# Patient Record
Sex: FEMALE | Race: WHITE | NOT HISPANIC OR LATINO
[De-identification: names, ages, dates, MRNs, and addresses within clinical notes are randomized per-mention and may not be internally consistent; named-entity substitution may affect disease eponyms.]

---

## 2017-01-26 ENCOUNTER — MEDICATION RENEWAL (OUTPATIENT)
Age: 77
End: 2017-01-26

## 2017-01-28 ENCOUNTER — RX RENEWAL (OUTPATIENT)
Age: 77
End: 2017-01-28

## 2017-03-07 ENCOUNTER — APPOINTMENT (OUTPATIENT)
Dept: PULMONOLOGY | Facility: CLINIC | Age: 77
End: 2017-03-07

## 2017-03-07 VITALS
HEIGHT: 66 IN | WEIGHT: 150 LBS | DIASTOLIC BLOOD PRESSURE: 75 MMHG | BODY MASS INDEX: 24.11 KG/M2 | HEART RATE: 107 BPM | OXYGEN SATURATION: 100 % | SYSTOLIC BLOOD PRESSURE: 120 MMHG | RESPIRATION RATE: 17 BRPM

## 2017-03-07 DIAGNOSIS — E66.9 OBESITY, UNSPECIFIED: ICD-10-CM

## 2017-03-07 DIAGNOSIS — R06.83 SNORING: ICD-10-CM

## 2017-03-07 RX ORDER — AMOXICILLIN 500 MG/1
500 CAPSULE ORAL
Qty: 21 | Refills: 0 | Status: DISCONTINUED | COMMUNITY
Start: 2016-09-19 | End: 2017-03-07

## 2017-03-07 RX ORDER — METHYLPREDNISOLONE 4 MG/1
4 TABLET ORAL
Qty: 21 | Refills: 0 | Status: DISCONTINUED | COMMUNITY
Start: 2017-01-29 | End: 2017-03-07

## 2017-03-07 RX ORDER — BETAMETHASONE DIPROPIONATE 0.5 MG/G
0.05 CREAM, AUGMENTED TOPICAL
Qty: 50 | Refills: 0 | Status: ACTIVE | COMMUNITY
Start: 2017-02-23

## 2017-03-07 RX ORDER — CEFADROXIL 500 MG/1
500 CAPSULE ORAL
Qty: 21 | Refills: 0 | Status: DISCONTINUED | COMMUNITY
Start: 2016-12-20 | End: 2017-03-07

## 2017-03-07 RX ORDER — BUTALBITAL, ACETAMINOPHEN AND CAFFEINE 325; 50; 40 MG/1; MG/1; MG/1
50-325-40 TABLET ORAL
Qty: 120 | Refills: 0 | Status: ACTIVE | COMMUNITY
Start: 2016-10-25

## 2017-03-27 ENCOUNTER — RX RENEWAL (OUTPATIENT)
Age: 77
End: 2017-03-27

## 2017-04-24 ENCOUNTER — RX RENEWAL (OUTPATIENT)
Age: 77
End: 2017-04-24

## 2017-05-17 ENCOUNTER — RX RENEWAL (OUTPATIENT)
Age: 77
End: 2017-05-17

## 2017-05-23 ENCOUNTER — RX RENEWAL (OUTPATIENT)
Age: 77
End: 2017-05-23

## 2017-06-08 ENCOUNTER — APPOINTMENT (OUTPATIENT)
Dept: PULMONOLOGY | Facility: CLINIC | Age: 77
End: 2017-06-08

## 2017-06-09 ENCOUNTER — APPOINTMENT (OUTPATIENT)
Dept: INTERNAL MEDICINE | Facility: CLINIC | Age: 77
End: 2017-06-09

## 2017-06-10 LAB
ALBUMIN SERPL ELPH-MCNC: 4.2 G/DL
ALP BLD-CCNC: 127 U/L
ALT SERPL-CCNC: 22 U/L
ANION GAP SERPL CALC-SCNC: 16 MMOL/L
AST SERPL-CCNC: 28 U/L
BILIRUB SERPL-MCNC: 0.2 MG/DL
BUN SERPL-MCNC: 32 MG/DL
CALCIUM SERPL-MCNC: 9.4 MG/DL
CHLORIDE SERPL-SCNC: 99 MMOL/L
CHOLEST SERPL-MCNC: 197 MG/DL
CHOLEST/HDLC SERPL: 2.1 RATIO
CO2 SERPL-SCNC: 25 MMOL/L
CREAT SERPL-MCNC: 0.93 MG/DL
GLUCOSE SERPL-MCNC: 89 MG/DL
HDLC SERPL-MCNC: 92 MG/DL
LDLC SERPL CALC-MCNC: 93 MG/DL
POTASSIUM SERPL-SCNC: 4.5 MMOL/L
PROT SERPL-MCNC: 7.6 G/DL
SODIUM SERPL-SCNC: 140 MMOL/L
TRIGL SERPL-MCNC: 61 MG/DL

## 2017-06-13 ENCOUNTER — APPOINTMENT (OUTPATIENT)
Dept: INTERNAL MEDICINE | Facility: CLINIC | Age: 77
End: 2017-06-13

## 2017-06-13 VITALS
HEART RATE: 70 BPM | DIASTOLIC BLOOD PRESSURE: 80 MMHG | SYSTOLIC BLOOD PRESSURE: 140 MMHG | WEIGHT: 173 LBS | BODY MASS INDEX: 27.92 KG/M2 | RESPIRATION RATE: 16 BRPM

## 2017-06-13 RX ORDER — RANITIDINE 300 MG/1
300 TABLET ORAL
Qty: 1 | Refills: 1 | Status: DISCONTINUED | COMMUNITY
Start: 2017-03-07 | End: 2017-06-13

## 2017-06-13 RX ORDER — MELOXICAM 7.5 MG/1
7.5 TABLET ORAL
Qty: 30 | Refills: 0 | Status: DISCONTINUED | COMMUNITY
Start: 2016-12-13 | End: 2017-06-13

## 2017-06-13 RX ORDER — IBUPROFEN 800 MG/1
800 TABLET, FILM COATED ORAL
Qty: 21 | Refills: 0 | Status: DISCONTINUED | COMMUNITY
Start: 2016-09-19 | End: 2017-06-13

## 2017-06-13 RX ORDER — AZELASTINE HYDROCHLORIDE 205.5 UG/1
0.15 SPRAY, METERED NASAL
Qty: 30 | Refills: 0 | Status: DISCONTINUED | COMMUNITY
Start: 2017-02-28 | End: 2017-06-13

## 2017-06-13 RX ORDER — IPRATROPIUM BROMIDE 42 UG/1
0.06 SPRAY NASAL
Qty: 3 | Refills: 1 | Status: DISCONTINUED | COMMUNITY
Start: 2017-03-07 | End: 2017-06-13

## 2017-06-13 RX ORDER — MONTELUKAST SODIUM 10 MG/1
10 TABLET, FILM COATED ORAL
Qty: 1 | Refills: 1 | Status: DISCONTINUED | COMMUNITY
Start: 2017-03-07 | End: 2017-06-13

## 2017-06-13 RX ORDER — FLUTICASONE PROPIONATE AND SALMETEROL 50; 250 UG/1; UG/1
250-50 POWDER RESPIRATORY (INHALATION)
Qty: 1 | Refills: 5 | Status: DISCONTINUED | COMMUNITY
Start: 2017-03-07 | End: 2017-06-13

## 2017-07-07 ENCOUNTER — RX RENEWAL (OUTPATIENT)
Age: 77
End: 2017-07-07

## 2017-07-18 ENCOUNTER — RX RENEWAL (OUTPATIENT)
Age: 77
End: 2017-07-18

## 2017-08-09 ENCOUNTER — RX RENEWAL (OUTPATIENT)
Age: 77
End: 2017-08-09

## 2017-09-14 ENCOUNTER — RX RENEWAL (OUTPATIENT)
Age: 77
End: 2017-09-14

## 2017-09-27 ENCOUNTER — RX RENEWAL (OUTPATIENT)
Age: 77
End: 2017-09-27

## 2017-10-02 ENCOUNTER — RX RENEWAL (OUTPATIENT)
Age: 77
End: 2017-10-02

## 2017-10-03 ENCOUNTER — MEDICATION RENEWAL (OUTPATIENT)
Age: 77
End: 2017-10-03

## 2017-11-28 ENCOUNTER — RX RENEWAL (OUTPATIENT)
Age: 77
End: 2017-11-28

## 2017-11-28 ENCOUNTER — APPOINTMENT (OUTPATIENT)
Dept: INTERNAL MEDICINE | Facility: CLINIC | Age: 77
End: 2017-11-28
Payer: MEDICARE

## 2017-11-28 PROCEDURE — 36415 COLL VENOUS BLD VENIPUNCTURE: CPT

## 2017-11-28 RX ORDER — ALPRAZOLAM 0.25 MG/1
0.25 TABLET ORAL
Qty: 60 | Refills: 0 | Status: DISCONTINUED | COMMUNITY
Start: 2017-01-26 | End: 2017-11-28

## 2017-11-29 LAB
25(OH)D3 SERPL-MCNC: 32.8 NG/ML
ALBUMIN SERPL ELPH-MCNC: 4 G/DL
ALP BLD-CCNC: 154 U/L
ALT SERPL-CCNC: 23 U/L
ANION GAP SERPL CALC-SCNC: 14 MMOL/L
APPEARANCE: CLEAR
AST SERPL-CCNC: 24 U/L
BASOPHILS # BLD AUTO: 0.04 K/UL
BASOPHILS NFR BLD AUTO: 0.9 %
BILIRUB SERPL-MCNC: 0.2 MG/DL
BILIRUBIN URINE: NEGATIVE
BLOOD URINE: NEGATIVE
BUN SERPL-MCNC: 28 MG/DL
CALCIUM SERPL-MCNC: 10 MG/DL
CHLORIDE SERPL-SCNC: 100 MMOL/L
CHOLEST SERPL-MCNC: 205 MG/DL
CHOLEST/HDLC SERPL: 2.3 RATIO
CO2 SERPL-SCNC: 27 MMOL/L
COLOR: YELLOW
CREAT SERPL-MCNC: 0.79 MG/DL
EOSINOPHIL # BLD AUTO: 0.4 K/UL
EOSINOPHIL NFR BLD AUTO: 8.5 %
GLUCOSE QUALITATIVE U: NEGATIVE MG/DL
GLUCOSE SERPL-MCNC: 88 MG/DL
HCT VFR BLD CALC: 41.8 %
HDLC SERPL-MCNC: 91 MG/DL
HGB BLD-MCNC: 13.3 G/DL
IMM GRANULOCYTES NFR BLD AUTO: 0.2 %
KETONES URINE: NEGATIVE
LDLC SERPL CALC-MCNC: 101 MG/DL
LEUKOCYTE ESTERASE URINE: NEGATIVE
LYMPHOCYTES # BLD AUTO: 1.19 K/UL
LYMPHOCYTES NFR BLD AUTO: 25.4 %
MAN DIFF?: NORMAL
MCHC RBC-ENTMCNC: 31.4 PG
MCHC RBC-ENTMCNC: 31.8 GM/DL
MCV RBC AUTO: 98.6 FL
MONOCYTES # BLD AUTO: 0.42 K/UL
MONOCYTES NFR BLD AUTO: 9 %
NEUTROPHILS # BLD AUTO: 2.62 K/UL
NEUTROPHILS NFR BLD AUTO: 56 %
NITRITE URINE: NEGATIVE
PH URINE: 6.5
PLATELET # BLD AUTO: 232 K/UL
POTASSIUM SERPL-SCNC: 5 MMOL/L
PROT SERPL-MCNC: 7.1 G/DL
PROTEIN URINE: NEGATIVE MG/DL
RBC # BLD: 4.24 M/UL
RBC # FLD: 13.3 %
SODIUM SERPL-SCNC: 141 MMOL/L
SPECIFIC GRAVITY URINE: 1.03
T4 SERPL-MCNC: 5.7 UG/DL
TRIGL SERPL-MCNC: 66 MG/DL
TSH SERPL-ACNC: 1.45 UIU/ML
UROBILINOGEN URINE: NEGATIVE MG/DL
WBC # FLD AUTO: 4.68 K/UL

## 2017-12-05 ENCOUNTER — APPOINTMENT (OUTPATIENT)
Dept: INTERNAL MEDICINE | Facility: CLINIC | Age: 77
End: 2017-12-05
Payer: MEDICARE

## 2017-12-05 ENCOUNTER — NON-APPOINTMENT (OUTPATIENT)
Age: 77
End: 2017-12-05

## 2017-12-05 VITALS
SYSTOLIC BLOOD PRESSURE: 130 MMHG | RESPIRATION RATE: 16 BRPM | BODY MASS INDEX: 26.36 KG/M2 | HEART RATE: 66 BPM | HEIGHT: 66 IN | WEIGHT: 164 LBS | DIASTOLIC BLOOD PRESSURE: 70 MMHG

## 2017-12-05 DIAGNOSIS — Z23 ENCOUNTER FOR IMMUNIZATION: ICD-10-CM

## 2017-12-05 DIAGNOSIS — R74.8 ABNORMAL LEVELS OF OTHER SERUM ENZYMES: ICD-10-CM

## 2017-12-05 DIAGNOSIS — I45.10 UNSPECIFIED RIGHT BUNDLE-BRANCH BLOCK: ICD-10-CM

## 2017-12-05 PROCEDURE — 99213 OFFICE O/P EST LOW 20 MIN: CPT | Mod: 25

## 2017-12-05 PROCEDURE — G0439: CPT

## 2017-12-05 PROCEDURE — G0009: CPT

## 2017-12-05 PROCEDURE — 93000 ELECTROCARDIOGRAM COMPLETE: CPT

## 2017-12-05 PROCEDURE — 90732 PPSV23 VACC 2 YRS+ SUBQ/IM: CPT

## 2017-12-05 PROCEDURE — 99497 ADVNCD CARE PLAN 30 MIN: CPT | Mod: 33

## 2017-12-05 RX ORDER — HYDROXYCHLOROQUINE SULFATE 200 MG/1
200 TABLET ORAL TWICE DAILY
Refills: 0 | Status: DISCONTINUED | COMMUNITY
Start: 2017-06-13 | End: 2017-12-05

## 2017-12-13 ENCOUNTER — RX RENEWAL (OUTPATIENT)
Age: 77
End: 2017-12-13

## 2018-01-22 ENCOUNTER — RX RENEWAL (OUTPATIENT)
Age: 78
End: 2018-01-22

## 2018-03-16 ENCOUNTER — RX RENEWAL (OUTPATIENT)
Age: 78
End: 2018-03-16

## 2018-04-12 ENCOUNTER — RX RENEWAL (OUTPATIENT)
Age: 78
End: 2018-04-12

## 2018-04-24 ENCOUNTER — RX RENEWAL (OUTPATIENT)
Age: 78
End: 2018-04-24

## 2018-05-10 ENCOUNTER — MEDICATION RENEWAL (OUTPATIENT)
Age: 78
End: 2018-05-10

## 2018-06-05 ENCOUNTER — APPOINTMENT (OUTPATIENT)
Dept: INTERNAL MEDICINE | Facility: CLINIC | Age: 78
End: 2018-06-05
Payer: MEDICARE

## 2018-06-05 VITALS
SYSTOLIC BLOOD PRESSURE: 130 MMHG | DIASTOLIC BLOOD PRESSURE: 80 MMHG | RESPIRATION RATE: 16 BRPM | BODY MASS INDEX: 27.92 KG/M2 | WEIGHT: 173 LBS | HEART RATE: 78 BPM

## 2018-06-05 DIAGNOSIS — J30.9 ALLERGIC RHINITIS, UNSPECIFIED: ICD-10-CM

## 2018-06-05 PROCEDURE — 36415 COLL VENOUS BLD VENIPUNCTURE: CPT

## 2018-06-05 PROCEDURE — 99214 OFFICE O/P EST MOD 30 MIN: CPT | Mod: 25

## 2018-06-05 RX ORDER — FAMCICLOVIR 500 MG/1
500 TABLET, FILM COATED ORAL
Qty: 21 | Refills: 0 | Status: DISCONTINUED | COMMUNITY
Start: 2017-12-06

## 2018-06-05 RX ORDER — CLOTRIMAZOLE 10 MG/1
10 LOZENGE ORAL
Qty: 90 | Refills: 0 | Status: DISCONTINUED | COMMUNITY
Start: 2017-12-04

## 2018-06-05 NOTE — PHYSICAL EXAM
[No Acute Distress] : no acute distress [Well Nourished] : well nourished [Well Developed] : well developed [Normal Sclera/Conjunctiva] : normal sclera/conjunctiva [Normal Oropharynx] : the oropharynx was normal [No JVD] : no jugular venous distention [Supple] : supple [No Lymphadenopathy] : no lymphadenopathy [No Respiratory Distress] : no respiratory distress  [Clear to Auscultation] : lungs were clear to auscultation bilaterally [No Accessory Muscle Use] : no accessory muscle use [Regular Rhythm] : with a regular rhythm [Normal S1, S2] : normal S1 and S2 [No Murmur] : no murmur heard [No Edema] : there was no peripheral edema [Soft] : abdomen soft [Non Tender] : non-tender [No HSM] : no HSM [Normal Supraclavicular Nodes] : no supraclavicular lymphadenopathy [Normal Posterior Cervical Nodes] : no posterior cervical lymphadenopathy [Normal Anterior Cervical Nodes] : no anterior cervical lymphadenopathy [No CVA Tenderness] : no CVA  tenderness [No Spinal Tenderness] : no spinal tenderness [No Focal Deficits] : no focal deficits [Alert and Oriented x3] : oriented to person, place, and time

## 2018-06-05 NOTE — HISTORY OF PRESENT ILLNESS
[FreeTextEntry1] : FU for hyperlipidemia - and depression\par So-so on diet\par Weight stable\par \par

## 2018-06-05 NOTE — ASSESSMENT
[FreeTextEntry1] : Pt with above medical issues\par Labs sent\par Continue meds\par FU 6 months  -CC

## 2018-06-06 LAB
ALBUMIN SERPL ELPH-MCNC: 3.9 G/DL
ALP BLD-CCNC: 143 U/L
ALT SERPL-CCNC: 17 U/L
ANION GAP SERPL CALC-SCNC: 14 MMOL/L
AST SERPL-CCNC: 27 U/L
BILIRUB SERPL-MCNC: 0.2 MG/DL
BUN SERPL-MCNC: 20 MG/DL
CALCIUM SERPL-MCNC: 9.7 MG/DL
CHLORIDE SERPL-SCNC: 103 MMOL/L
CHOLEST SERPL-MCNC: 198 MG/DL
CHOLEST/HDLC SERPL: 1.9 RATIO
CO2 SERPL-SCNC: 26 MMOL/L
CREAT SERPL-MCNC: 0.77 MG/DL
GLUCOSE SERPL-MCNC: 81 MG/DL
HDLC SERPL-MCNC: 104 MG/DL
LDLC SERPL CALC-MCNC: 79 MG/DL
POTASSIUM SERPL-SCNC: 4.3 MMOL/L
PROT SERPL-MCNC: 6.9 G/DL
SODIUM SERPL-SCNC: 143 MMOL/L
TRIGL SERPL-MCNC: 74 MG/DL

## 2018-06-16 ENCOUNTER — RX RENEWAL (OUTPATIENT)
Age: 78
End: 2018-06-16

## 2018-07-09 ENCOUNTER — APPOINTMENT (OUTPATIENT)
Dept: INTERNAL MEDICINE | Facility: CLINIC | Age: 78
End: 2018-07-09
Payer: MEDICARE

## 2018-07-09 VITALS
SYSTOLIC BLOOD PRESSURE: 120 MMHG | RESPIRATION RATE: 16 BRPM | DIASTOLIC BLOOD PRESSURE: 70 MMHG | TEMPERATURE: 98 F | HEART RATE: 72 BPM

## 2018-07-09 VITALS — TEMPERATURE: 98 F | HEIGHT: 66 IN

## 2018-07-09 PROCEDURE — 99213 OFFICE O/P EST LOW 20 MIN: CPT

## 2018-07-09 RX ORDER — CLOBETASOL PROPIONATE 0.5 MG/G
0.05 CREAM TOPICAL
Qty: 30 | Refills: 0 | Status: DISCONTINUED | COMMUNITY
Start: 2018-06-07

## 2018-07-09 RX ORDER — SUMATRIPTAN 50 MG/1
50 TABLET, FILM COATED ORAL
Qty: 10 | Refills: 0 | Status: DISCONTINUED | COMMUNITY
Start: 2018-06-25

## 2018-07-09 NOTE — PHYSICAL EXAM
[No Acute Distress] : no acute distress [Well Nourished] : well nourished [Well Developed] : well developed [Well-Appearing] : well-appearing [Normal Sclera/Conjunctiva] : normal sclera/conjunctiva [Normal Oropharynx] : the oropharynx was normal [Normal TMs] : both tympanic membranes were normal [No JVD] : no jugular venous distention [Supple] : supple [No Lymphadenopathy] : no lymphadenopathy [Normal Rate] : normal rate  [Normal S1, S2] : normal S1 and S2 [No Murmur] : no murmur heard [No Edema] : there was no peripheral edema [Normal Supraclavicular Nodes] : no supraclavicular lymphadenopathy [Normal Posterior Cervical Nodes] : no posterior cervical lymphadenopathy [Normal Anterior Cervical Nodes] : no anterior cervical lymphadenopathy [Soft] : abdomen soft [Non Tender] : non-tender [No HSM] : no HSM [de-identified] : scattered wheezing  -coarse sounds - no consolidation

## 2018-07-09 NOTE — REVIEW OF SYSTEMS
[Fever] : fever [Cough] : cough [Negative] : Gastrointestinal [Earache] : no earache [Sore Throat] : no sore throat

## 2018-07-09 NOTE — HISTORY OF PRESENT ILLNESS
[FreeTextEntry1] : Friday  -got nasal congestion - Saturday temp to 101 - Sunday was 100.\par Coughing  - green mucous.\par No sore throat. No earaches\par Headaches\par No nausea or vomiting.\par Lot of nasal mucous.

## 2018-07-16 ENCOUNTER — MEDICATION RENEWAL (OUTPATIENT)
Age: 78
End: 2018-07-16

## 2018-09-15 ENCOUNTER — RX RENEWAL (OUTPATIENT)
Age: 78
End: 2018-09-15

## 2018-09-30 ENCOUNTER — RX RENEWAL (OUTPATIENT)
Age: 78
End: 2018-09-30

## 2018-10-03 ENCOUNTER — EMERGENCY (EMERGENCY)
Facility: HOSPITAL | Age: 78
LOS: 1 days | Discharge: HOME | End: 2018-10-03
Attending: EMERGENCY MEDICINE
Payer: MEDICARE

## 2018-10-03 VITALS
HEIGHT: 66 IN | OXYGEN SATURATION: 97 % | DIASTOLIC BLOOD PRESSURE: 54 MMHG | WEIGHT: 154.98 LBS | TEMPERATURE: 99 F | HEART RATE: 101 BPM | RESPIRATION RATE: 17 BRPM | SYSTOLIC BLOOD PRESSURE: 90 MMHG

## 2018-10-03 LAB
ALBUMIN SERPL ELPH-MCNC: 3.5 G/DL — SIGNIFICANT CHANGE UP (ref 3.3–5)
ALP SERPL-CCNC: 158 U/L — HIGH (ref 40–120)
ALT FLD-CCNC: 49 U/L — HIGH (ref 10–45)
ANION GAP SERPL CALC-SCNC: 11 MMOL/L — SIGNIFICANT CHANGE UP (ref 5–17)
AST SERPL-CCNC: 51 U/L — HIGH (ref 10–40)
BASOPHILS # BLD AUTO: 0 K/UL — SIGNIFICANT CHANGE UP (ref 0–0.2)
BASOPHILS NFR BLD AUTO: 0.2 % — SIGNIFICANT CHANGE UP (ref 0–2)
BILIRUB SERPL-MCNC: 0.5 MG/DL — SIGNIFICANT CHANGE UP (ref 0.2–1.2)
BUN SERPL-MCNC: 20 MG/DL — SIGNIFICANT CHANGE UP (ref 7–23)
CALCIUM SERPL-MCNC: 9.3 MG/DL — SIGNIFICANT CHANGE UP (ref 8.4–10.5)
CHLORIDE SERPL-SCNC: 99 MMOL/L — SIGNIFICANT CHANGE UP (ref 96–108)
CO2 SERPL-SCNC: 25 MMOL/L — SIGNIFICANT CHANGE UP (ref 22–31)
CREAT SERPL-MCNC: 1.13 MG/DL — SIGNIFICANT CHANGE UP (ref 0.5–1.3)
EOSINOPHIL # BLD AUTO: 0.1 K/UL — SIGNIFICANT CHANGE UP (ref 0–0.5)
EOSINOPHIL NFR BLD AUTO: 0.7 % — SIGNIFICANT CHANGE UP (ref 0–6)
ERYTHROCYTE [SEDIMENTATION RATE] IN BLOOD: 87 MM/HR — HIGH (ref 0–20)
GLUCOSE SERPL-MCNC: 112 MG/DL — HIGH (ref 70–99)
HCT VFR BLD CALC: 38.8 % — SIGNIFICANT CHANGE UP (ref 34.5–45)
HGB BLD-MCNC: 12.9 G/DL — SIGNIFICANT CHANGE UP (ref 11.5–15.5)
LYMPHOCYTES # BLD AUTO: 0.7 K/UL — LOW (ref 1–3.3)
LYMPHOCYTES # BLD AUTO: 9.4 % — LOW (ref 13–44)
MCHC RBC-ENTMCNC: 31.3 PG — SIGNIFICANT CHANGE UP (ref 27–34)
MCHC RBC-ENTMCNC: 33.1 GM/DL — SIGNIFICANT CHANGE UP (ref 32–36)
MCV RBC AUTO: 94.7 FL — SIGNIFICANT CHANGE UP (ref 80–100)
MONOCYTES # BLD AUTO: 0.6 K/UL — SIGNIFICANT CHANGE UP (ref 0–0.9)
MONOCYTES NFR BLD AUTO: 7.9 % — SIGNIFICANT CHANGE UP (ref 2–14)
NEUTROPHILS # BLD AUTO: 6.3 K/UL — SIGNIFICANT CHANGE UP (ref 1.8–7.4)
NEUTROPHILS NFR BLD AUTO: 81.8 % — HIGH (ref 43–77)
PLATELET # BLD AUTO: 169 K/UL — SIGNIFICANT CHANGE UP (ref 150–400)
POTASSIUM SERPL-MCNC: 3.7 MMOL/L — SIGNIFICANT CHANGE UP (ref 3.5–5.3)
POTASSIUM SERPL-SCNC: 3.7 MMOL/L — SIGNIFICANT CHANGE UP (ref 3.5–5.3)
PROT SERPL-MCNC: 6.9 G/DL — SIGNIFICANT CHANGE UP (ref 6–8.3)
RBC # BLD: 4.1 M/UL — SIGNIFICANT CHANGE UP (ref 3.8–5.2)
RBC # FLD: 12.1 % — SIGNIFICANT CHANGE UP (ref 10.3–14.5)
SODIUM SERPL-SCNC: 135 MMOL/L — SIGNIFICANT CHANGE UP (ref 135–145)
WBC # BLD: 7.8 K/UL — SIGNIFICANT CHANGE UP (ref 3.8–10.5)
WBC # FLD AUTO: 7.8 K/UL — SIGNIFICANT CHANGE UP (ref 3.8–10.5)

## 2018-10-03 PROCEDURE — 73590 X-RAY EXAM OF LOWER LEG: CPT | Mod: 26,LT

## 2018-10-03 PROCEDURE — 73562 X-RAY EXAM OF KNEE 3: CPT | Mod: 26,LT

## 2018-10-03 PROCEDURE — 71045 X-RAY EXAM CHEST 1 VIEW: CPT | Mod: 26

## 2018-10-03 PROCEDURE — 99284 EMERGENCY DEPT VISIT MOD MDM: CPT

## 2018-10-03 RX ORDER — KETOROLAC TROMETHAMINE 30 MG/ML
15 SYRINGE (ML) INJECTION ONCE
Qty: 0 | Refills: 0 | Status: DISCONTINUED | OUTPATIENT
Start: 2018-10-03 | End: 2018-10-03

## 2018-10-03 RX ORDER — KETOROLAC TROMETHAMINE 30 MG/ML
30 SYRINGE (ML) INJECTION ONCE
Qty: 0 | Refills: 0 | Status: DISCONTINUED | OUTPATIENT
Start: 2018-10-03 | End: 2018-10-03

## 2018-10-03 RX ORDER — ACETAMINOPHEN 500 MG
650 TABLET ORAL ONCE
Qty: 0 | Refills: 0 | Status: COMPLETED | OUTPATIENT
Start: 2018-10-03 | End: 2018-10-03

## 2018-10-03 RX ORDER — SODIUM CHLORIDE 9 MG/ML
1000 INJECTION INTRAMUSCULAR; INTRAVENOUS; SUBCUTANEOUS ONCE
Qty: 0 | Refills: 0 | Status: COMPLETED | OUTPATIENT
Start: 2018-10-03 | End: 2018-10-03

## 2018-10-03 RX ADMIN — Medication 650 MILLIGRAM(S): at 20:30

## 2018-10-03 RX ADMIN — SODIUM CHLORIDE 500 MILLILITER(S): 9 INJECTION INTRAMUSCULAR; INTRAVENOUS; SUBCUTANEOUS at 23:04

## 2018-10-03 NOTE — ED PROVIDER NOTE - MUSCULOSKELETAL MINIMAL EXAM
LLE: +obvious swelling noted diffusely to left knee. +ballotement. Mild tenderness directly over patella region. No medial/lateral joint line tenderness. ROM limited 2/2 swelling, able to flex to ~30 degrees and extend to ~0 degrees before pain. No ankle or hip tenderness. Full AROM at hip and ankle with 5/5 strength. Sensation intact. No calf tenderness, asymmetry or swelling./atraumatic

## 2018-10-03 NOTE — ED PROVIDER NOTE - SKIN COLOR
+bruising noted to anterior tibial region without surrounding tenderness, swelling, or erythema./normal for race

## 2018-10-03 NOTE — CONSULT NOTE ADULT - ASSESSMENT
Will discuss with Dr. Cooper  Chest Xray Ordered  UA Ordered  CT L Knee Will discuss with Dr. Cooper  Chest Xray Ordered  UA Ordered  With amount of pain that patient is in and difficulty with axial loading, CT L Knee r/o possibly Occult fracture leading to pain/bruising/effusion Will discuss with Dr. Cooper  Chest Xray Ordered  UA Ordered  With amount of pain that patient is in and difficulty with axial loading, CT L Knee r/o possibly Occult fracture leading to pain/bruising/effusion  Hold off on Abx for now incase patient necessitates Tap Will discuss with Dr. Cooper  Chest Xray Ordered  UA Ordered  With amount of pain that patient is in and difficulty with axial loading, CT L Knee r/o possibly Occult fracture leading to pain/bruising/effusion  Hold off on Abx for now incase patient necessitates Tap    Patient left AMA, Knee was aspirated overnight in sterile fashion with Betadine prep, 60 cc cloudy fluid was aspirated and sent for cell count, culture, and gram stain, crystals  Cell count elevated 35K, 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

## 2018-10-03 NOTE — ED ADULT TRIAGE NOTE - CHIEF COMPLAINT QUOTE
left knee pain x 2 days was seen at [place] yesterday and they wanted to keep her until she could walk but she didn't want to stay, had a xray done showing mild soft tissue edema at [place]. Had a rectal temp today of 105, she took tylenol 2 hours ago.

## 2018-10-03 NOTE — ED ADULT NURSE NOTE - CHIEF COMPLAINT QUOTE
left knee pain x 2 days was seen at [place] yesterday and they wanted to keep her until she could walk but she didn't want to stay, had a xray done showing mild soft tissue edema at [place]. Had a rectal temp today of 105, she took tylenol 2 hours ago.

## 2018-10-03 NOTE — ED PROVIDER NOTE - OBJECTIVE STATEMENT
79 yo female PMHx anxiety presents to the ED c/o left knee pain x 3 days. Patient states 4 days prior she walked around in a park all day with her  and then the next day woke up and noticed left knee felt very sore with localized pain. Tried motrin at home with some relief, however pain persisted so the next day patient 77 yo female PMHx anxiety presents to the ED c/o left knee pain x 3 days. Patient states 4 days prior she walked around in a park all day with her  and then the next day woke up and noticed left knee felt very sore with localized pain. Tried motrin at home with some relief, however pain persisted so the next day patient went to [place], had xrays done which showed effusion and then left prior to complete workup per patient. Reports fell 10 days prior but never had pain after. Today noted rectal temp of 105 at home, took tylenol and came to ED. Reports pain with full ROM of L leg at knee. Denies calf pain, chest pain, hip pain, numbness/tingling, dizziness, n/v, tick bite, recent travel in wooded area, other joint pain.

## 2018-10-03 NOTE — ED PROVIDER NOTE - PROGRESS NOTE DETAILS
Attending MD Romero: XR report from ANA Nixon here today, labs and fever discussed with Ortho.  original knee surgery Dr Douglas Paget at [facility], will await ortho. Attending MD Romero: Discussed with ortho, recommend completion of the UA and CXR, CT for occult fracture, will await and pending timing may be signed out to overnight team Jhoan Ballesteros DO: Patient was endorsed to me by Dr. Romero at the end of her shift to follow up results of CT knee and Ortho consult rec. Ct showed effusion hanh-implant and ortho tap knee. fluid wbc 36k and gpcc on gram stains. myself and Ortho resident spoke with pt regarding these results and the need for operative intervention. per Ortho abx alone will not be helpful and rec against abx prior to OR so long as pt stable and without overt signs of sepsis, which is her current state. Patient does not want to stay. She states she has been in the ED for a long time and wants to go home today. She was explained by myself and the Ortho the resident the risk of leaving and not getting appropriate treat and that untreated this could cause numerous complications not limited to disseminated infection, amputation, permanent disability or even death. Patient did not want to stay. Patient offered more pain medication but declined. She was offered to stay and we would arrange transfer to S (operating surgeon) and she again declined. patient had decisional capacity and was unable to be convinced to stay. her  was at bedside and he was unable to convince the patient as well. They understood the importance of follow up today and need for admission and surgery and pt  states they will go to [facility] later this morning. Patient advised to return to ED immediately if any worsening of symptoms or concerning symptoms. Patient was discharged AMA.

## 2018-10-03 NOTE — CONSULT NOTE ADULT - SUBJECTIVE AND OBJECTIVE BOX
78y Female community ambulator seen and examined, patient states she has worsening pain that became most apparent on Sunday although she does admit a fall a week ago and she has bruises on b/l LE with the L>R, patient also has multiple excoriations on b/l LE and b/l UE possibly from scratching? Patient anxious in ED on exam. Patient states that she had fever of 105 rectally at home however temperature was taken on exam here and 99.0, Patient also states that she was diagnosed a couple years ago with Sjogren's Syndrome, and that she has a hypersympathetic response that's why her skin is red.   Patient had b/l TKA's Done at [facility] 10 years ago, which have not caused her any trouble like this before  She hasn't had any issues with ambulation that she can remember with her total knees  Patient went to [place] yesterday for similar complaint and was placed in holding for observation until she was able to be evaluated by PT for walking but she left.  Patient spoke with Dr. Garcia prior to coming to [facility]                        12.9   7.8   )-----------( 169      ( 03 Oct 2018 18:50 )             38.8     03 Oct 2018 18:50  135    |  99     |  20     ----------------------------<  112    3.7     |  25     |  1.13     Ca    9.3        03 Oct 2018 18:50  TPro  6.9    /  Alb  3.5    /  TBili  0.5    /  DBili  x      /  AST  51     /  ALT  49     /  AlkPhos  158    03 Oct 2018 18:50    Vital Signs Last 24 Hrs  T(C): 37.3 (10-03-18 @ 22:13), Max: 38 (10-03-18 @ 20:29)  T(F): 99.1 (10-03-18 @ 22:13), Max: 100.4 (10-03-18 @ 20:29)  HR: 117 (10-03-18 @ 22:13) (84 - 117)  BP: 107/77 (10-03-18 @ 22:13) (90/54 - 148/68)  BP(mean): --  RR: 17 (10-03-18 @ 22:13) (17 - 18)  SpO2: 99% (10-03-18 @ 22:13) (97% - 100%)

## 2018-10-03 NOTE — ED PROVIDER NOTE - ATTENDING CONTRIBUTION TO CARE
Attending MD Romero:   I personally have seen and examined this patient.  Physician assistant note reviewed and agree on plan of care and except where noted.  See below for details.     78F with PMH/PSH including bilateral total knee replacements (Dr Douglas Paget [facility]), anxiety presents to the ED with L knee pain.  Reports that about 1-2 weeks ago she fell.  Denies pain after the fall and reports had been ambulating at baseline afterwards.  Reports Sunday 9/30/18 did a lot of walking.  Reports had mild pain only.  Reports awoke the following morning with pain and swelling.  Reports the following day went to [place] where she had XRays done and was told she had an effusion.  Review of paperworks reveals possible patellar fracture.  Reports today she had a rectal temp of 105, reports took Tylenol and then came to the ED.  Reports spoke with Dr Hassan (a pulmonologist) who recommended she come in and request to be seen by Dr Cooper.  Reports able to range the L knee but limited secondary to pain.  Denies trauma.  Denies other complaints including chest pain, shortness of breath, cough, abdominal pain, nausea, vomiting, blood in stools, urinary complaints.  On exam, NAD, head NCAT, PERRL, FROM at neck, no tenderness to palpation or stepoffs along length of spine, lungs CTAB with good inspiratory effort, +S1S2, no m/r/g, abdomen soft with +BS, NT, ND, no CVAT, moving all extremities, LLE with +tenderness to palpation diffusely over knee, +effusion, +warmth, ROM limited secondary to pain, +ecchymosis to anterior tibial area, +2 DPs; A/P: 78F with fall 10 days ago now with fever, pain, swelling, warmth at L knee, Ddx includes septic joint, infected hematoma, loosened hardware, will obtain labs, Cx, knee XR, tib/fib XR, ortho consult

## 2018-10-03 NOTE — ED PROVIDER NOTE - CHIEF COMPLAINT
The patient is a 78y Female complaining of The patient is a 78y Female complaining of left knee pain

## 2018-10-03 NOTE — ED ADULT NURSE NOTE - OBJECTIVE STATEMENT
Patient is a 78 year old female complaining of sudden left knee pain, beginning 3 days ago and fever today. Arrived by walk-in. Patient has history of Sjograms, HLD, depression, migraines. Patient is A&O x 3 and appears fall. Pt reports having a fall 12 days prior and scraping her left knee, mechanical trip and fall. Endorsing complaints of diff walking and bearing weight on left leg, she is ambulatory with pain and difficulty. Denies complaints of chest pain, sob, chills, n/v/d, headache, syncope, burning urination, blood in urine, blood in stool. Abd is soft, non tender, non distended. Skin is warm and dry. Color is consistent with ethnicity. Left knee is swollen and hot to touch. She reports having tylenol and motrin 1500 today. VSS/ NAD. Safety and comfort maintained.  at the bedside. Will continue to monitor.

## 2018-10-04 ENCOUNTER — TRANSCRIPTION ENCOUNTER (OUTPATIENT)
Age: 78
End: 2018-10-04

## 2018-10-04 ENCOUNTER — INPATIENT (INPATIENT)
Facility: HOSPITAL | Age: 78
LOS: 5 days | Discharge: HOME | DRG: 485 | End: 2018-10-10
Attending: ORTHOPAEDIC SURGERY | Admitting: ORTHOPAEDIC SURGERY
Payer: MEDICARE

## 2018-10-04 VITALS
DIASTOLIC BLOOD PRESSURE: 50 MMHG | OXYGEN SATURATION: 97 % | TEMPERATURE: 98 F | SYSTOLIC BLOOD PRESSURE: 90 MMHG | HEIGHT: 66 IN | HEART RATE: 118 BPM | WEIGHT: 154.98 LBS

## 2018-10-04 VITALS
OXYGEN SATURATION: 98 % | SYSTOLIC BLOOD PRESSURE: 131 MMHG | DIASTOLIC BLOOD PRESSURE: 76 MMHG | HEART RATE: 97 BPM | RESPIRATION RATE: 18 BRPM | TEMPERATURE: 100 F

## 2018-10-04 DIAGNOSIS — M00.9 PYOGENIC ARTHRITIS, UNSPECIFIED: ICD-10-CM

## 2018-10-04 LAB
ALBUMIN SERPL ELPH-MCNC: 2.9 G/DL — LOW (ref 3.3–5)
ALP SERPL-CCNC: 142 U/L — HIGH (ref 40–120)
ALT FLD-CCNC: 37 U/L — SIGNIFICANT CHANGE UP (ref 10–45)
ANION GAP SERPL CALC-SCNC: 12 MMOL/L — SIGNIFICANT CHANGE UP (ref 5–17)
APPEARANCE UR: ABNORMAL
APTT BLD: 27.8 SEC — SIGNIFICANT CHANGE UP (ref 27.5–37.4)
APTT BLD: 32.3 SEC — SIGNIFICANT CHANGE UP (ref 27.5–37.4)
AST SERPL-CCNC: 32 U/L — SIGNIFICANT CHANGE UP (ref 10–40)
B PERT IGG+IGM PNL SER: ABNORMAL
BASOPHILS # BLD AUTO: 0 K/UL — SIGNIFICANT CHANGE UP (ref 0–0.2)
BASOPHILS NFR BLD AUTO: 0.3 % — SIGNIFICANT CHANGE UP (ref 0–2)
BILIRUB SERPL-MCNC: 0.4 MG/DL — SIGNIFICANT CHANGE UP (ref 0.2–1.2)
BILIRUB UR-MCNC: NEGATIVE — SIGNIFICANT CHANGE UP
BLD GP AB SCN SERPL QL: NEGATIVE — SIGNIFICANT CHANGE UP
BUN SERPL-MCNC: 16 MG/DL — SIGNIFICANT CHANGE UP (ref 7–23)
CALCIUM SERPL-MCNC: 8.9 MG/DL — SIGNIFICANT CHANGE UP (ref 8.4–10.5)
CHLORIDE SERPL-SCNC: 95 MMOL/L — LOW (ref 96–108)
CO2 SERPL-SCNC: 22 MMOL/L — SIGNIFICANT CHANGE UP (ref 22–31)
COLOR FLD: YELLOW — SIGNIFICANT CHANGE UP
COLOR SPEC: YELLOW — SIGNIFICANT CHANGE UP
CREAT SERPL-MCNC: 0.97 MG/DL — SIGNIFICANT CHANGE UP (ref 0.5–1.3)
CRP SERPL-MCNC: 35.83 MG/DL — HIGH (ref 0–0.4)
DIFF PNL FLD: ABNORMAL
EOSINOPHIL # BLD AUTO: 0 K/UL — SIGNIFICANT CHANGE UP (ref 0–0.5)
EOSINOPHIL NFR BLD AUTO: 0.6 % — SIGNIFICANT CHANGE UP (ref 0–6)
FLUID INTAKE SUBSTANCE CLASS: SIGNIFICANT CHANGE UP
FLUID SEGMENTED GRANULOCYTES: 91 % — SIGNIFICANT CHANGE UP
GLUCOSE SERPL-MCNC: 211 MG/DL — HIGH (ref 70–99)
GLUCOSE UR QL: NEGATIVE — SIGNIFICANT CHANGE UP
GRAM STN FLD: SIGNIFICANT CHANGE UP
HCT VFR BLD CALC: 37.2 % — SIGNIFICANT CHANGE UP (ref 34.5–45)
HGB BLD-MCNC: 12.2 G/DL — SIGNIFICANT CHANGE UP (ref 11.5–15.5)
INR BLD: 1.37 RATIO — HIGH (ref 0.88–1.16)
INR BLD: 1.44 RATIO — HIGH (ref 0.88–1.16)
KETONES UR-MCNC: ABNORMAL
LACTATE BLDV-MCNC: 2 MMOL/L — SIGNIFICANT CHANGE UP (ref 0.7–2)
LACTATE BLDV-MCNC: 2.4 MMOL/L — HIGH (ref 0.7–2)
LEUKOCYTE ESTERASE UR-ACNC: ABNORMAL
LYMPHOCYTES # BLD AUTO: 0.5 K/UL — LOW (ref 1–3.3)
LYMPHOCYTES # BLD AUTO: 6.5 % — LOW (ref 13–44)
LYMPHOCYTES # FLD: 5 % — SIGNIFICANT CHANGE UP
MCHC RBC-ENTMCNC: 31 PG — SIGNIFICANT CHANGE UP (ref 27–34)
MCHC RBC-ENTMCNC: 32.8 GM/DL — SIGNIFICANT CHANGE UP (ref 32–36)
MCV RBC AUTO: 94.8 FL — SIGNIFICANT CHANGE UP (ref 80–100)
METHOD TYPE: SIGNIFICANT CHANGE UP
MONOCYTES # BLD AUTO: 0.4 K/UL — SIGNIFICANT CHANGE UP (ref 0–0.9)
MONOCYTES NFR BLD AUTO: 6.3 % — SIGNIFICANT CHANGE UP (ref 2–14)
MONOS+MACROS # FLD: 4 % — SIGNIFICANT CHANGE UP
MSSA DNA SPEC QL NAA+PROBE: SIGNIFICANT CHANGE UP
NEUTROPHILS # BLD AUTO: 6.1 K/UL — SIGNIFICANT CHANGE UP (ref 1.8–7.4)
NEUTROPHILS NFR BLD AUTO: 86.3 % — HIGH (ref 43–77)
NITRITE UR-MCNC: NEGATIVE — SIGNIFICANT CHANGE UP
PH UR: 6 — SIGNIFICANT CHANGE UP (ref 5–8)
PLATELET # BLD AUTO: 183 K/UL — SIGNIFICANT CHANGE UP (ref 150–400)
POTASSIUM SERPL-MCNC: 3 MMOL/L — LOW (ref 3.5–5.3)
POTASSIUM SERPL-SCNC: 3 MMOL/L — LOW (ref 3.5–5.3)
PROT SERPL-MCNC: 6.5 G/DL — SIGNIFICANT CHANGE UP (ref 6–8.3)
PROT UR-MCNC: ABNORMAL
PROTHROM AB SERPL-ACNC: 14.9 SEC — HIGH (ref 9.8–12.7)
PROTHROM AB SERPL-ACNC: 15.8 SEC — HIGH (ref 9.8–12.7)
RBC # BLD: 3.93 M/UL — SIGNIFICANT CHANGE UP (ref 3.8–5.2)
RBC # FLD: 12.2 % — SIGNIFICANT CHANGE UP (ref 10.3–14.5)
RCV VOL RI: 6200 /UL — HIGH (ref 0–5)
RH IG SCN BLD-IMP: POSITIVE — SIGNIFICANT CHANGE UP
SODIUM SERPL-SCNC: 129 MMOL/L — LOW (ref 135–145)
SP GR SPEC: 1.02 — SIGNIFICANT CHANGE UP (ref 1.01–1.02)
SPECIMEN SOURCE: SIGNIFICANT CHANGE UP
TOTAL NUCLEATED CELL COUNT, BODY FLUID: HIGH /UL (ref 0–5)
TUBE TYPE: SIGNIFICANT CHANGE UP
UROBILINOGEN FLD QL: NEGATIVE — SIGNIFICANT CHANGE UP
WBC # BLD: 7.1 K/UL — SIGNIFICANT CHANGE UP (ref 3.8–10.5)
WBC # FLD AUTO: 7.1 K/UL — SIGNIFICANT CHANGE UP (ref 3.8–10.5)

## 2018-10-04 PROCEDURE — 73700 CT LOWER EXTREMITY W/O DYE: CPT | Mod: 26,LT

## 2018-10-04 PROCEDURE — 99232 SBSQ HOSP IP/OBS MODERATE 35: CPT | Mod: GC,57

## 2018-10-04 PROCEDURE — 99285 EMERGENCY DEPT VISIT HI MDM: CPT

## 2018-10-04 PROCEDURE — 76377 3D RENDER W/INTRP POSTPROCES: CPT | Mod: 26

## 2018-10-04 RX ORDER — ZOLPIDEM TARTRATE 10 MG/1
5 TABLET ORAL AT BEDTIME
Qty: 0 | Refills: 0 | Status: DISCONTINUED | OUTPATIENT
Start: 2018-10-04 | End: 2018-10-05

## 2018-10-04 RX ORDER — CEFAZOLIN SODIUM 1 G
2000 VIAL (EA) INJECTION EVERY 8 HOURS
Qty: 0 | Refills: 0 | Status: DISCONTINUED | OUTPATIENT
Start: 2018-10-05 | End: 2018-10-05

## 2018-10-04 RX ORDER — FAMOTIDINE 10 MG/ML
20 INJECTION INTRAVENOUS EVERY 12 HOURS
Qty: 0 | Refills: 0 | Status: DISCONTINUED | OUTPATIENT
Start: 2018-10-04 | End: 2018-10-05

## 2018-10-04 RX ORDER — CEFAZOLIN SODIUM 1 G
VIAL (EA) INJECTION
Qty: 0 | Refills: 0 | Status: DISCONTINUED | OUTPATIENT
Start: 2018-10-04 | End: 2018-10-05

## 2018-10-04 RX ORDER — ACETAMINOPHEN 500 MG
975 TABLET ORAL EVERY 8 HOURS
Qty: 0 | Refills: 0 | Status: DISCONTINUED | OUTPATIENT
Start: 2018-10-04 | End: 2018-10-05

## 2018-10-04 RX ORDER — MAGNESIUM HYDROXIDE 400 MG/1
30 TABLET, CHEWABLE ORAL DAILY
Qty: 0 | Refills: 0 | Status: DISCONTINUED | OUTPATIENT
Start: 2018-10-04 | End: 2018-10-05

## 2018-10-04 RX ORDER — SENNA PLUS 8.6 MG/1
2 TABLET ORAL AT BEDTIME
Qty: 0 | Refills: 0 | Status: DISCONTINUED | OUTPATIENT
Start: 2018-10-04 | End: 2018-10-05

## 2018-10-04 RX ORDER — DOCUSATE SODIUM 100 MG
100 CAPSULE ORAL THREE TIMES A DAY
Qty: 0 | Refills: 0 | Status: DISCONTINUED | OUTPATIENT
Start: 2018-10-04 | End: 2018-10-05

## 2018-10-04 RX ORDER — ATORVASTATIN CALCIUM 80 MG/1
10 TABLET, FILM COATED ORAL AT BEDTIME
Qty: 0 | Refills: 0 | Status: DISCONTINUED | OUTPATIENT
Start: 2018-10-04 | End: 2018-10-05

## 2018-10-04 RX ORDER — SODIUM CHLORIDE 9 MG/ML
1000 INJECTION INTRAMUSCULAR; INTRAVENOUS; SUBCUTANEOUS
Qty: 0 | Refills: 0 | Status: DISCONTINUED | OUTPATIENT
Start: 2018-10-04 | End: 2018-10-05

## 2018-10-04 RX ORDER — CEFAZOLIN SODIUM 1 G
2000 VIAL (EA) INJECTION ONCE
Qty: 0 | Refills: 0 | Status: COMPLETED | OUTPATIENT
Start: 2018-10-04 | End: 2018-10-04

## 2018-10-04 RX ORDER — ACETAMINOPHEN 500 MG
975 TABLET ORAL ONCE
Qty: 0 | Refills: 0 | Status: COMPLETED | OUTPATIENT
Start: 2018-10-04 | End: 2018-10-04

## 2018-10-04 RX ORDER — SODIUM CHLORIDE 9 MG/ML
2100 INJECTION INTRAMUSCULAR; INTRAVENOUS; SUBCUTANEOUS ONCE
Qty: 0 | Refills: 0 | Status: COMPLETED | OUTPATIENT
Start: 2018-10-04 | End: 2018-10-04

## 2018-10-04 RX ORDER — ALPRAZOLAM 0.25 MG
0.5 TABLET ORAL
Qty: 0 | Refills: 0 | Status: DISCONTINUED | OUTPATIENT
Start: 2018-10-04 | End: 2018-10-05

## 2018-10-04 RX ORDER — FUROSEMIDE 40 MG
20 TABLET ORAL DAILY
Qty: 0 | Refills: 0 | Status: DISCONTINUED | OUTPATIENT
Start: 2018-10-04 | End: 2018-10-05

## 2018-10-04 RX ORDER — OXYCODONE HYDROCHLORIDE 5 MG/1
2.5 TABLET ORAL EVERY 4 HOURS
Qty: 0 | Refills: 0 | Status: DISCONTINUED | OUTPATIENT
Start: 2018-10-04 | End: 2018-10-05

## 2018-10-04 RX ORDER — VANCOMYCIN HCL 1 G
1000 VIAL (EA) INTRAVENOUS ONCE
Qty: 0 | Refills: 0 | Status: COMPLETED | OUTPATIENT
Start: 2018-10-04 | End: 2018-10-04

## 2018-10-04 RX ORDER — OXYCODONE HYDROCHLORIDE 5 MG/1
5 TABLET ORAL EVERY 4 HOURS
Qty: 0 | Refills: 0 | Status: DISCONTINUED | OUTPATIENT
Start: 2018-10-04 | End: 2018-10-05

## 2018-10-04 RX ORDER — CEFTRIAXONE 500 MG/1
1 INJECTION, POWDER, FOR SOLUTION INTRAMUSCULAR; INTRAVENOUS ONCE
Qty: 0 | Refills: 0 | Status: COMPLETED | OUTPATIENT
Start: 2018-10-04 | End: 2018-10-04

## 2018-10-04 RX ORDER — VANCOMYCIN HCL 1 G
1000 VIAL (EA) INTRAVENOUS EVERY 12 HOURS
Qty: 0 | Refills: 0 | Status: DISCONTINUED | OUTPATIENT
Start: 2018-10-04 | End: 2018-10-05

## 2018-10-04 RX ADMIN — SODIUM CHLORIDE 2100 MILLILITER(S): 9 INJECTION INTRAMUSCULAR; INTRAVENOUS; SUBCUTANEOUS at 15:58

## 2018-10-04 RX ADMIN — Medication 975 MILLIGRAM(S): at 23:08

## 2018-10-04 RX ADMIN — Medication 20 MILLIGRAM(S): at 23:03

## 2018-10-04 RX ADMIN — Medication 1000 MILLIGRAM(S): at 21:21

## 2018-10-04 RX ADMIN — Medication 20 MILLIGRAM(S): at 23:02

## 2018-10-04 RX ADMIN — SODIUM CHLORIDE 2100 MILLILITER(S): 9 INJECTION INTRAMUSCULAR; INTRAVENOUS; SUBCUTANEOUS at 21:21

## 2018-10-04 RX ADMIN — ATORVASTATIN CALCIUM 10 MILLIGRAM(S): 80 TABLET, FILM COATED ORAL at 23:03

## 2018-10-04 RX ADMIN — CEFTRIAXONE 100 GRAM(S): 500 INJECTION, POWDER, FOR SOLUTION INTRAMUSCULAR; INTRAVENOUS at 21:21

## 2018-10-04 RX ADMIN — Medication 100 MILLIGRAM(S): at 23:03

## 2018-10-04 RX ADMIN — Medication 975 MILLIGRAM(S): at 19:00

## 2018-10-04 RX ADMIN — Medication 15 MILLIGRAM(S): at 00:00

## 2018-10-04 RX ADMIN — Medication 250 MILLIGRAM(S): at 19:55

## 2018-10-04 NOTE — ED PROVIDER NOTE - NOTES
Spoke with ortho regarding that patient returned today after being evaluated last night and now c/o increased pain to L knee, tachycardic, code sepsis with + blood culture and gram stain of joint. Will see patient in ED.

## 2018-10-04 NOTE — ED PROVIDER NOTE - MUSCULOSKELETAL MINIMAL EXAM
LLE: +diffuse swelling to left knee with increased warmth and +overlying tenderness to palpation. ROM severely limited 2/2 pain. ROM at hip and foot intact with 5/5 strength. Sensation intact LLE. 2+ DP pulses b/l full and equal.

## 2018-10-04 NOTE — ED POST DISCHARGE NOTE - RESULT SUMMARY
Blood culture + gram pos cocci in clusters in aerobic and anaerobic bottles w/ detected staph aureus (received critical value from [facility])-Patti Cooper PA-C

## 2018-10-04 NOTE — CONSULT NOTE ADULT - ASSESSMENT
79 yo woman with a hx of a fall two weeks ago present with pain and swelling of the left knee. found to have an infection of the left knee. Patient is scheduled for a wasout of the knee.

## 2018-10-04 NOTE — ED POST DISCHARGE NOTE - ADDITIONAL DOCUMENTATION
Urged patient to return to ED asap for eval, admission and treatment. Informed that this is a life threatening condition and should not be ignored. Patient states she does not wish to follow up at [facility] and would rather return to [facility] where she had her surgery for further management. Understands she must go today and states she will get dressed now. Informed to have providers call [facility] regarding results once arrives. All questions answered and risks/benefits discussed. Patient verbalizes understanding -Patti Cooper PA-C

## 2018-10-04 NOTE — ED ADULT NURSE REASSESSMENT NOTE - NS ED NURSE REASSESS COMMENT FT1
Pt received laying in bed. Tachycardic on monitor. Febrile to 102. Tylenol given by previous RN. Denying chest pain, SOB, n/v/d. Ortho at bedside assessing pt.

## 2018-10-04 NOTE — H&P ADULT - ATTENDING COMMENTS
I agree with the above note and have personally seen and examined this patient. All pertinent films have been reviewed. Please refer to clinical documentation of the history, physical examinations, data summary, and both assessment and plan as documented above and with which I agree.    In brief this is a 78F w/L TKA Dr. Dorado approx 10 years ago presenting with approximately 5 days L knee pain and diffiulty weightbearing. no known source for infection except recent fall with abrasion on L knee. 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 ROM L knee 0-90, stable v/v stress. SILT L4-S1. Firing ta/ehl/gcs. 2+ dp. Abx started given fever and tachycardia. I met the patient and her  in the ED 10/4 and had a full dsicussion with them regarding the nature of this diagnosis as well as the various treatment options including nonsurgical and treating with abx versus surgical for I&D and PE exchange versus 2-stage exchange. We will monitor cultures for now before making a determination for the OR but possibly OR tomorrow for the above procedures.    I discussed with the patient the risks and benefits of the proposed surgery. The patient is an appropriate candidate for consideration of left total knee arthroplasty irrigation and debridement and polyethyelne exchange versus explantation, placement of antibiotic spacer with or without a wound or incisional vac. This recommendation is based on the patients current diagnosis of PJI  and positive joint fluid cultures. An extensive discussion was conducted of the natural history of this particular problem and the variety of surgical and non-surgical treatment options available to the patient. A risk/benefit analysis was discussed with the patient reviewing the advantages and disadvantages of surgical intervention at this time. A full explanation was given of the nature and the purpose of the procedure and anesthesia, its benefits, possible alternative methods of diagnosis or treatment, the risks involved, the possibility of complications, the foreseeable consequences of the procedure and the possible results of the non-treatment.  No guarantee or assurance was made as to the results that may be obtained. Specifically, the risks were identified to include, but are not limited to the following: Infection, phlebitis, pulmonary embolism, death, paralysis, dislocation, pain, stiffness, instability, limp, weakness, breakage, leg-length inequality, uncontrolled bleeding, nerve injury, blood vessel injury, pressure sores, anesthetic risks, delayed healing of wound and bone, and wear and loosening. She understands that she will be unable to bend his knee if I place a static antibiotic spacer and will need to modulate her weightbearing to 20% weightbearing on the right leg while the spacer is in place. She knows that if she bends the knee or bears full weight then he is at risk for breaking his bone around the implant, causing further erosion or displacement of the construct. Further discussion was undertaken with the patient about the details of surgical preparation, treatment, and postoperative rehabilitation including medical clearance, the hospital course, and the postoperative rehabilitation involved. No guarantee was made to the patient about our ability to clear this chronic infection and I did not promise to reimplant a total knee arthroplasty at any point in the future. She understands that she may end up with a permanent knee fusion or amputation. This infection can become life threatening. She understands the risk to his heart, lungs, and kidneys from the surgery. She understands that if her wound fails to heal then she may require further plastic surgery intervention procedures which may include a muscle flap and/or skin graft. Reimplantation may require cemented or cementless components, or both, depending upon a variety of factors that must be assessed at the time of surgery. All in all, I feel that this patient is a good candidate for surgical intervention.    Bunny Montoya MD  Attending Orthopedic Surgeon

## 2018-10-04 NOTE — ED PROVIDER NOTE - OBJECTIVE STATEMENT
77 yo female PMhx anxiety c/o fever and L knee pain. Patient seen at [facility] ED last night for 79 yo female PMhx anxiety and b/l total knee replacements (left knee done 2007) c/o fever and L knee pain. Patient seen at [facility] ED last night for left knee pain and fever's, was vignesh 77 yo female PMhx anxiety and b/l total knee replacements (left knee done 2007) c/o fever and L knee pain. Patient seen at [facility] ED last night for left knee pain and fever's, was diagnose with infected L knee joint after arthrocentesis and was advised to stay for admission but left AMA after advising ED staff she would go to [facility]. Went home in AM and continued to having fevers at home with increasing pain in L knee so came back. Fever tmax ~104 at home rectally. No dizziness, no chest pain, no n/v, no weakness, no numbness/tingling.

## 2018-10-04 NOTE — ED ADULT NURSE NOTE - OBJECTIVE STATEMENT
78 y.o female c c/o fever. Pt signed out AMA late last night. Blood cultures were sent p/t signing out AMA. Pt returned for persistent fever. Pt went home and was sleeping. L knee replacement 6 years ago at [facility] for special surgery. Knee is swollen and painful- fluid from knee aspirated last night. No antibiotics ordered at this time attending wants ortho consulted. Sepsis protocol initiated one additional blood culture sent.  at bedside. Area of ecchymosis noted to R forearm from blood draw last night.

## 2018-10-04 NOTE — CONSULT NOTE ADULT - SUBJECTIVE AND OBJECTIVE BOX
79 yo female PMhx anxiety and b/l total knee replacements (left knee done ) c/o fever and L knee pain. Patient seen at [facility] ED last night for left knee pain and fever's, was diagnose with infected L knee joint after arthrocentesis and was advised to stay for admission but left AMA after advising ED staff she would go to [facility]. Went home in AM and continued to having fevers at home with increasing pain in L knee so came back. Fever tmax ~104 at home rectally. No dizziness, no chest pain, no n/v, no weakness, no numbness/tingling. Patient seen now resting comfortably. scheduled for washout of left knee          PAST MEDICAL & SURGICAL HISTORY:  Hyperlipemia  Anxiety  H/O total knee replacement: bilateral  History of appendectomy  S/P cataract surgery        MEDICATIONS  (STANDING):  acetaminophen   Tablet .. 975 milliGRAM(s) Oral every 8 hours  atorvastatin 10 milliGRAM(s) Oral at bedtime  ceFAZolin   IVPB 2000 milliGRAM(s) IV Intermittent every 8 hours  ceFAZolin   IVPB      docusate sodium 100 milliGRAM(s) Oral three times a day  famotidine    Tablet 20 milliGRAM(s) Oral every 12 hours  furosemide    Tablet 20 milliGRAM(s) Oral daily  PARoxetine 20 milliGRAM(s) Oral daily  senna 2 Tablet(s) Oral at bedtime  sodium chloride 0.9%. 1000 milliLiter(s) (125 mL/Hr) IV Continuous <Continuous>  vancomycin  IVPB 1000 milliGRAM(s) IV Intermittent every 12 hours    MEDICATIONS  (PRN):  ALPRAZolam 0.5 milliGRAM(s) Oral two times a day PRN anxiety  magnesium hydroxide Suspension 30 milliLiter(s) Oral daily PRN Constipation  oxyCODONE    IR 2.5 milliGRAM(s) Oral every 4 hours PRN Moderate Pain (4 - 6)  oxyCODONE    IR 5 milliGRAM(s) Oral every 4 hours PRN Severe Pain (7 - 10)  zolpidem 5 milliGRAM(s) Oral at bedtime PRN Insomnia  zolpidem 5 milliGRAM(s) Oral at bedtime PRN Insomnia      Soc Hx:  Tobacco: ne  ETOH: occasionally  Drugs: Neg    Family Hx  CAD   sepsis      CONSTITUTIONAL: No weakness, fevers or chills  EYES/ENT: No visual changes;  No vertigo or throat pain   NECK: No pain or stiffness  RESPIRATORY: No cough, wheezing, hemoptysis; No shortness of breath  CARDIOVASCULAR: No chest pain or palpitations  GASTROINTESTINAL: No abdominal or epigastric pain. No nausea, vomiting, or hematemesis; No diarrhea or constipation. No melena or hematochezia.  GENITOURINARY: No dysuria, frequency or hematuria  NEUROLOGICAL: No numbness or weakness  SKIN: No itching, burning, rashes, or lesions   MUSCULOSKELETAL: left leg pain    INTERVAL HPI/OVERNIGHT EVENTS:  T(C): 36.6 (10-04-18 @ 23:57), Max: 39.5 (10-04-18 @ 18:47)  HR: 90 (10-04-18 @ 23:57) (85 - 118)  BP: 159/97 (10-04-18 @ 23:57) (90/50 - 159/97)  RR: 18 (10-04-18 @ 23:57) (18 - 20)  SpO2: 100% (10-04-18 @ 23:57) (97% - 100%)  Wt(kg): --  I&O's Summary      PHYSICAL EXAM:  GENERAL: NAD, well-groomed, well-developed  HEAD:  Atraumatic, Normocephalic  EYES: EOMI, PERRLA, conjunctiva and sclera clear  ENMT: No tonsillar erythema, exudates, or enlargement; Moist mucous membranes, Good dentition, No lesions  NECK: Supple, No JVD, Normal thyroid  NERVOUS SYSTEM:  Alert & Oriented X3, Good concentration; Motor Strength 5/5 B/L upper and lower extremities; DTRs 2+ intact and symmetric  CHEST/LUNG: Clear to percussion bilaterally; No rales, rhonchi, wheezing, or rubs  HEART: Regular rate and rhythm; No murmurs, rubs, or gallops  ABDOMEN: Soft, Nontender, Nondistended; Bowel sounds present  EXTREMITIES: Bruising and swelling of the left LE  LYMPH: No lymphadenopathy noted  SKIN: No rashes or lesions        LABS:                        12.2   7.1   )-----------( 183      ( 04 Oct 2018 15:45 )             37.2     10-04    129<L>  |  95<L>  |  16  ----------------------------<  211<H>  3.0<L>   |  22  |  0.97    Ca    8.9      04 Oct 2018 15:45    TPro  6.5  /  Alb  2.9<L>  /  TBili  0.4  /  DBili  x   /  AST  32  /  ALT  37  /  AlkPhos  142<H>  10-04    PT/INR - ( 04 Oct 2018 15:45 )   PT: 15.8 sec;   INR: 1.44 ratio         PTT - ( 04 Oct 2018 15:45 )  PTT:32.3 sec  Urinalysis Basic - ( 03 Oct 2018 23:50 )    Color: Yellow / Appearance: Slightly Turbid / S.025 / pH: x  Gluc: x / Ketone: Small  / Bili: Negative / Urobili: Negative   Blood: x / Protein: 100 mg/dL / Nitrite: Negative   Leuk Esterase: Small / RBC: 6 /hpf / WBC 12 /hpf   Sq Epi: x / Non Sq Epi: 7 /hpf / Bacteria: Negative      CAPILLARY BLOOD GLUCOSE      EKG:  NSR @ 100 with a RBBB      Urinalysis Basic - ( 03 Oct 2018 23:50 )    Color: Yellow / Appearance: Slightly Turbid / S.025 / pH: x  Gluc: x / Ketone: Small  / Bili: Negative / Urobili: Negative   Blood: x / Protein: 100 mg/dL / Nitrite: Negative   Leuk Esterase: Small / RBC: 6 /hpf / WBC 12 /hpf   Sq Epi: x / Non Sq Epi: 7 /hpf / Bacteria: Negative        Radiology reports:

## 2018-10-04 NOTE — ED POST DISCHARGE NOTE - DETAILS
Spoke directly to patient informed of + blood cultures as well as growth in the knee synovial fluid. -Patti Cooper PA-C patient returned, currently admitted for septic joint, washout. - Chintan Mccarthy PA-C 10/7/18: + Blood Cx and gram +cocci in cluster. Patient currently admitted for bacteremia/ septic joint s/p washout currently being treated with ancef 2g q8-Bertha WOOD

## 2018-10-04 NOTE — ED PROVIDER NOTE - PROGRESS NOTE DETAILS
Ortho made aware patient returned after AMA yesterday. Workup yesterday c/w infected joint. They advised to hold antibiotics at this time, will see patient in ED. - Tyson Alarcon PA-C Ortho made aware patient returned after AMA yesterday. Workup yesterday c/w infected joint. They advised to hold antibiotics at this time. They are aware of positive blood cultures and code sepsis, yet still strongly advised no abx. MD made aware. Ortho to see patient in ED. - Tyson Alarcon PA-C Ortho immediately made aware patient returned after AMA yesterday and septic picture today. Workup yesterday c/w infected joint. They advised to hold antibiotics at this time. They are aware of positive blood cultures and code sepsis, yet still strongly advised no abx. MD made aware. Ortho to see patient in ED. - Tyson Alarcon PA-C Cap. Refill:   <2 sec   Pulses: full/equal bilaterally. Skin: Angely;; Lungs CTA b/l no wheezing, rales, rhonchi. Heart: RRR no murmurs, rubs, gallops. Ortho: L knee with + swelling and increased warmth.  I have re-evaluated the patient's fluid status and reviewed the vital signs.  Clinical evaluation demonstrates an appropriate response to fluid resuscitation. - Tyson Alarcon PA-C Called ortho as no recs yet. They saw patient already, trying to determine admitting MD. ED MD aware. - Tyson Alarcon PA-C Ortho immediately made aware patient returned after AMA yesterday and septic picture today. Workup yesterday c/w infected joint. They advised to hold antibiotics at this time. They are aware of positive blood cultures and code sepsis, yet still strongly advised no abx. ED MD made aware of this recommendation. Ortho to see patient in ED. - Tyson Alarcon PA-C Ortho immediately made aware patient returned after AMA yesterday and septic picture today. Workup yesterday showed elevated cells in joint aspiration, blood cx's taken yesterday + growth gram + cocci both bottles. They advised to hold antibiotics at this time despite both bottles. They are aware of positive blood cultures and code sepsis, yet still strongly advised no abx. ED MD made aware of this recommendation. Ortho to see patient in ED. - Tyson Alarcon PA-C Pt spiked fever, ortho resident still advises not to give abx. Is coming with attending in 1 hr to make disposition. Made ED. Ortho resident Skyler immediately made aware patient returned after AMA yesterday and septic picture today. Workup yesterday showed elevated cells in joint aspiration, blood cx's taken yesterday + growth gram + cocci both bottles. They advised to hold antibiotics at this time despite both bottles. They are aware of positive blood cultures and code sepsis, yet still strongly advised no abx. ED MD Dr. Larkin made aware of this recommendation and fill follow pending ortho eval. Ortho to see patient in ED. - Tyson Alarcon PA-C Called ortho resident Skyler as no recs yet. They saw patient already, trying to determine admitting MD. ED MD aware. Pt hemodynamically stable currently. afebrile. Discussed again starting abx but resident Anabell- Tyson Alarcon PA-C Spoke with orthopedic resident who informed me patient declined their initial plan for surgical management and wanted second opinion. They were arranging transfer to S when patient changed mind and now opting for surgical intervention here in ED. Immediately called orthopedic resident and made him aware of this. Pt spiked fever, ortho resident still advises not to give abx and is coming with attending in 1 hr to make decision disposition. ED MD aware. - Tyson Alarcon PA-C Spoke with orthopedic resident who informed me patient declined their initial plan for surgical management and wanted second opinion. They were arranging transfer to S when patient changed mind and now opting for surgical intervention here in ED. Immediately called orthopedic resident and made him aware of this. Pt spiked fever, ortho resident still advises not to give abx and is coming with attending in 1 hr to make decision disposition. ED MD Dr. Guidry aware. - Tyson Alarcon PA-C Ortho came down, now recommending vanco and will likely admit to their service pending attending approval. MD aware. Orders in. - Tyson Alarcon PA-C Ortho came down, now recommending vanco and will likely admit to their service pending attending approval. MD aware. Orders in for vanco. Pt re-evaluated and still appears well. Responded appropriately to IVF. BP stable. Mentating properly. Pain controlled. MD aware. - Tyson Alarcon PA-C Ortho called with final rec for admission to [facility]. MD aware. Order in. - KENYETTA HairC Ortho resident Skyler immediately made aware patient returned after AMA yesterday and now presenting w/ septic picture today, tachycardic, febrile, +bc. Workup yesterday showed elevated cells in joint aspiration w/ gram + cocci on gram stain,, blood cx's taken yesterday + growth gram + cocci both bottles. They advised to hold antibiotics at this time despite both bottles until their eval. They are aware of positive blood cultures and code sepsis, yet still strongly advised no abx. ED MD Dr. Larkin made aware of this recommendation and will hold abx pending ortho eval. Ortho to see patient in ED. - Tyson Alarcon PA-C Sepsis reassess: Cap. Refill:   <2 sec Pulses: full/equal bilaterally. Skin: Normal: Lungs CTA b/l no wheezing, rales, rhonchi. Heart: RRR no murmurs, rubs, gallops. MSK: L knee with + swelling and increased warmth.   I have re-evaluated the patient's fluid status and reviewed the vital signs.  Clinical evaluation demonstrates an appropriate response to fluid resuscitation. Afebrile. MD aware. Called ortho again to eval patient.  - Tyson Alarcon PA-C Called ortho resident Skyler as no formal recs yet. They saw patient already, trying to determine admitting MD on their end. ED MD made aware. Pt hemodynamically stable currently. afebrile. Discussed again starting abx given septic picture and will need despite any OR tx but ortho resident Skyler still strongly recommending against at this time until their final recs. ED MD made aware of this. - Tyson Alarcon PA-C Spoke with orthopedic resident who informed me patient declined their initial plan for surgical management and wanted second opinion. They were arranging transfer to S when patient changed mind and now opting for surgical intervention here in ED. Immediately called orthopedic resident and made him aware of this. Pt spiked fever, ortho resident still advises not to give abx and is coming with attending in 1 hr to make decision disposition. ED MD Dr. Guidry aware. If no recs by then will start abx per MD. - Tyson Alarcon PA-C Ortho came down, now recommending vanco and will likely admit to their service pending attending approval. MD aware. Orders in for vanco. Pt re-evaluated and still appears well. Responded appropriately to IVF. BP stable. Mentating properly. Cap refil <2 sec. Heart RRR no murmurs, rubs, gallops. Lungs CTA bilaterally. L knee swelling unchanged still w/ increased warmth.  Pain controlled. ED MD aware. - Tyson Alarcon PA-C Ortho called with final rec for admission to [facility]. MD aware. Orders in. Patient given ceftriaxone in addition to vanco earlier by ED team. - Tyson Alarcon PA-C

## 2018-10-04 NOTE — ED PROVIDER NOTE - MEDICAL DECISION MAKING DETAILS
Trae SOLORZANO: 77 yo F dx with septic left knee last night/ this AM - left AMA now returning. + fever. Work up done less than 24 hours ago. Will consult ortho. Will tx symptomatically, check labs. Per ortho, hold Abx as pt may need OR washout and culture.

## 2018-10-04 NOTE — ED PROVIDER NOTE - CARE PLAN
Principal Discharge DX:	Septic joint of left knee joint  Secondary Diagnosis:	Sepsis, due to unspecified organism

## 2018-10-04 NOTE — CONSULT NOTE ADULT - PROBLEM SELECTOR RECOMMENDATION 9
scheduled for a wash out tomorrow  No contraindication to scheduled procedure  DVT and GI Prophylaxis  Suggest ID eval

## 2018-10-04 NOTE — H&P ADULT - HISTORY OF PRESENT ILLNESS
78yFemale w/ hx of Sjogren's syndrome, HTN, HLD, BL TKA (2008 with Dr. Dorado, [facility]) and depression presented to ED c/o L knee pain for 4 days. Pt states that pain is worse with walking but can still bear weight. Pt denies radiation of pain. Pt denies numbness, tingling, or burning in the LLE. Fell 2 weeks ago but was still ambulating. +fever/chills. Pt is community ambulator without an assistive device. Was seen in ED early this AM and knee was aspirated. Growing GPC in both knee aspirate and blood. Patient left AMA initially to follow up at [facility], but came back this afternoon with fevers again. Denies other recent illnesses.    PMH:  as above    PSH:  H/O total knee replacement  History of appendectomy  S/P cataract surgery    AH:  NKDA    Meds: See med rec    T(C): 37.7 (10-04-18 @ 20:56)  HR: 100 (10-04-18 @ 20:56)  BP: 150/70 (10-04-18 @ 20:56)  RR: 20 (10-04-18 @ 20:56)  SpO2: 98% (10-04-18 @ 20:56)    LLE:  Skin intact, + soft tissue swelling, effusion, ecchymosis, erythema, warmth of knee, decreased ROM 2/2 pain (10-90 with pain). Excoriations over anterolateral knee. Good ROM of Hip/Knee/Ankle without pain. Pt able to SLR. +DP/PT Pulse 2+/4.  SILT L2-S1, +EHL/FHL/TA/Gastroc, soft compartments, no calf ttp.    RLE / B/L UE:   No bony TTP, Good ROM w/o pain. Able to SLR on R/L. Exam Unremarkable    WBC 7.1  ESR 87  CRP 35.83 mg/dL    Imaging:  XR/CT of L Knee:  TKA components without signs of loosening, no fractures    A/P: 78y Female w/ acute L TKA PJI, cultures with GPC in aspirate and blood    - Admit to Dr. Jan jordan  - Pain control  - WBAT  - IV abx: vanco/zosyn  - Follow cultures for bacteria  - ID consult  - Med/cards for OR clearance/optimization  - OR tomorrow for L knee I&D, PE exchange v. stage 1 revision TKA with antibiotic spacer placement  - Consent in chart, added on for tomorrow  - Home meds  - Will contact [facility] to get prior Op report  - NPO/IVF, AM labs    Patient seen and discussed with Dr. Jan Swartz MD

## 2018-10-04 NOTE — ED ADULT NURSE REASSESSMENT NOTE - NS ED NURSE REASSESS COMMENT FT1
1845 Pt spiking fever 103.1. Ortho doesn't want IV antibiotics to be given. Discussed c PA and ER attending. ortho attending coming down to evaluate pt for surgery I aprrox 1 hr. fever being treated. Fluid adalid sepsis protocol complete. repeat vbg drawn and sent.

## 2018-10-05 ENCOUNTER — RESULT REVIEW (OUTPATIENT)
Age: 78
End: 2018-10-05

## 2018-10-05 DIAGNOSIS — E78.5 HYPERLIPIDEMIA, UNSPECIFIED: ICD-10-CM

## 2018-10-05 DIAGNOSIS — F41.9 ANXIETY DISORDER, UNSPECIFIED: ICD-10-CM

## 2018-10-05 DIAGNOSIS — R52 PAIN, UNSPECIFIED: ICD-10-CM

## 2018-10-05 DIAGNOSIS — M00.9 PYOGENIC ARTHRITIS, UNSPECIFIED: ICD-10-CM

## 2018-10-05 LAB
ALBUMIN SERPL ELPH-MCNC: 2.5 G/DL — LOW (ref 3.3–5)
ALP SERPL-CCNC: 120 U/L — SIGNIFICANT CHANGE UP (ref 40–120)
ALT FLD-CCNC: 33 U/L — SIGNIFICANT CHANGE UP (ref 10–45)
ANION GAP SERPL CALC-SCNC: 11 MMOL/L — SIGNIFICANT CHANGE UP (ref 5–17)
APTT BLD: 32.1 SEC — SIGNIFICANT CHANGE UP (ref 27.5–37.4)
AST SERPL-CCNC: 27 U/L — SIGNIFICANT CHANGE UP (ref 10–40)
BASE EXCESS BLDV CALC-SCNC: -0.9 MMOL/L — SIGNIFICANT CHANGE UP (ref -2–2)
BILIRUB SERPL-MCNC: 0.3 MG/DL — SIGNIFICANT CHANGE UP (ref 0.2–1.2)
BLD GP AB SCN SERPL QL: NEGATIVE — SIGNIFICANT CHANGE UP
BUN SERPL-MCNC: 11 MG/DL — SIGNIFICANT CHANGE UP (ref 7–23)
CA-I BLD-SCNC: 1.15 MMOL/L — SIGNIFICANT CHANGE UP (ref 1.12–1.3)
CA-I SERPL-SCNC: 1.16 MMOL/L — SIGNIFICANT CHANGE UP (ref 1.12–1.3)
CALCIUM SERPL-MCNC: 8.3 MG/DL — LOW (ref 8.4–10.5)
CHLORIDE BLDV-SCNC: 106 MMOL/L — SIGNIFICANT CHANGE UP (ref 96–108)
CHLORIDE SERPL-SCNC: 98 MMOL/L — SIGNIFICANT CHANGE UP (ref 96–108)
CO2 BLDV-SCNC: 23 MMOL/L — SIGNIFICANT CHANGE UP (ref 22–30)
CO2 SERPL-SCNC: 21 MMOL/L — LOW (ref 22–31)
CREAT SERPL-MCNC: 0.71 MG/DL — SIGNIFICANT CHANGE UP (ref 0.5–1.3)
CULTURE RESULTS: NO GROWTH — SIGNIFICANT CHANGE UP
GAS PNL BLDV: 130 MMOL/L — LOW (ref 136–145)
GAS PNL BLDV: SIGNIFICANT CHANGE UP
GLUCOSE BLDV-MCNC: 159 MG/DL — HIGH (ref 70–99)
GLUCOSE SERPL-MCNC: 134 MG/DL — HIGH (ref 70–99)
GRAM STN FLD: SIGNIFICANT CHANGE UP
GRAM STN FLD: SIGNIFICANT CHANGE UP
HCO3 BLDV-SCNC: 22 MMOL/L — SIGNIFICANT CHANGE UP (ref 21–29)
HCT VFR BLD CALC: 33.5 % — LOW (ref 34.5–45)
HCT VFR BLDA CALC: 36 % — LOW (ref 39–50)
HGB BLD CALC-MCNC: 11.9 G/DL — SIGNIFICANT CHANGE UP (ref 11.5–15.5)
HGB BLD-MCNC: 11.4 G/DL — LOW (ref 11.5–15.5)
HOROWITZ INDEX BLDV+IHG-RTO: 21 — SIGNIFICANT CHANGE UP
INR BLD: 1.39 RATIO — HIGH (ref 0.88–1.16)
LACTATE BLDV-MCNC: 0.7 MMOL/L — SIGNIFICANT CHANGE UP (ref 0.7–2)
MCHC RBC-ENTMCNC: 31.9 PG — SIGNIFICANT CHANGE UP (ref 27–34)
MCHC RBC-ENTMCNC: 34.1 GM/DL — SIGNIFICANT CHANGE UP (ref 32–36)
MCV RBC AUTO: 93.5 FL — SIGNIFICANT CHANGE UP (ref 80–100)
OSMOLALITY SERPL: 284 MOS/KG — SIGNIFICANT CHANGE UP (ref 275–300)
OTHER CELLS CSF MANUAL: 15 ML/DL — LOW (ref 18–22)
PCO2 BLDV: 33 MMHG — LOW (ref 35–50)
PH BLDV: 7.44 — SIGNIFICANT CHANGE UP (ref 7.35–7.45)
PLATELET # BLD AUTO: 167 K/UL — SIGNIFICANT CHANGE UP (ref 150–400)
PO2 BLDV: 69 MMHG — HIGH (ref 25–45)
POTASSIUM BLDV-SCNC: 4 MMOL/L — SIGNIFICANT CHANGE UP (ref 3.5–5.3)
POTASSIUM SERPL-MCNC: 3.2 MMOL/L — LOW (ref 3.5–5.3)
POTASSIUM SERPL-SCNC: 3.2 MMOL/L — LOW (ref 3.5–5.3)
PROT SERPL-MCNC: 6.1 G/DL — SIGNIFICANT CHANGE UP (ref 6–8.3)
PROTHROM AB SERPL-ACNC: 15.2 SEC — HIGH (ref 9.8–12.7)
RBC # BLD: 3.58 M/UL — LOW (ref 3.8–5.2)
RBC # FLD: 12 % — SIGNIFICANT CHANGE UP (ref 10.3–14.5)
RH IG SCN BLD-IMP: POSITIVE — SIGNIFICANT CHANGE UP
SAO2 % BLDV: 95 % — HIGH (ref 67–88)
SODIUM SERPL-SCNC: 130 MMOL/L — LOW (ref 135–145)
SPECIMEN SOURCE: SIGNIFICANT CHANGE UP
SPECIMEN SOURCE: SIGNIFICANT CHANGE UP
WBC # BLD: 7.4 K/UL — SIGNIFICANT CHANGE UP (ref 3.8–10.5)
WBC # FLD AUTO: 7.4 K/UL — SIGNIFICANT CHANGE UP (ref 3.8–10.5)

## 2018-10-05 PROCEDURE — 99223 1ST HOSP IP/OBS HIGH 75: CPT

## 2018-10-05 PROCEDURE — 27486 REVISE/REPLACE KNEE JOINT: CPT | Mod: LT

## 2018-10-05 PROCEDURE — 71045 X-RAY EXAM CHEST 1 VIEW: CPT | Mod: 26

## 2018-10-05 PROCEDURE — 27310 EXPLORATION OF KNEE JOINT: CPT | Mod: 59,LT

## 2018-10-05 PROCEDURE — 93306 TTE W/DOPPLER COMPLETE: CPT | Mod: 26

## 2018-10-05 PROCEDURE — 73560 X-RAY EXAM OF KNEE 1 OR 2: CPT | Mod: 26,LT

## 2018-10-05 PROCEDURE — 99291 CRITICAL CARE FIRST HOUR: CPT

## 2018-10-05 PROCEDURE — 88300 SURGICAL PATH GROSS: CPT | Mod: 26

## 2018-10-05 RX ORDER — SODIUM CHLORIDE 9 MG/ML
1000 INJECTION, SOLUTION INTRAVENOUS
Qty: 0 | Refills: 0 | Status: DISCONTINUED | OUTPATIENT
Start: 2018-10-05 | End: 2018-10-05

## 2018-10-05 RX ORDER — SODIUM CHLORIDE 9 MG/ML
1000 INJECTION INTRAMUSCULAR; INTRAVENOUS; SUBCUTANEOUS
Qty: 0 | Refills: 0 | Status: DISCONTINUED | OUTPATIENT
Start: 2018-10-05 | End: 2018-10-05

## 2018-10-05 RX ORDER — VANCOMYCIN HCL 1 G
1000 VIAL (EA) INTRAVENOUS EVERY 12 HOURS
Qty: 0 | Refills: 0 | Status: DISCONTINUED | OUTPATIENT
Start: 2018-10-05 | End: 2018-10-05

## 2018-10-05 RX ORDER — SODIUM CHLORIDE 9 MG/ML
500 INJECTION INTRAMUSCULAR; INTRAVENOUS; SUBCUTANEOUS ONCE
Qty: 0 | Refills: 0 | Status: COMPLETED | OUTPATIENT
Start: 2018-10-05 | End: 2018-10-05

## 2018-10-05 RX ORDER — ONDANSETRON 8 MG/1
4 TABLET, FILM COATED ORAL ONCE
Qty: 0 | Refills: 0 | Status: DISCONTINUED | OUTPATIENT
Start: 2018-10-09 | End: 2018-10-10

## 2018-10-05 RX ORDER — HYDROMORPHONE HYDROCHLORIDE 2 MG/ML
0.25 INJECTION INTRAMUSCULAR; INTRAVENOUS; SUBCUTANEOUS
Qty: 0 | Refills: 0 | Status: DISCONTINUED | OUTPATIENT
Start: 2018-10-09 | End: 2018-10-10

## 2018-10-05 RX ORDER — CEFAZOLIN SODIUM 1 G
2000 VIAL (EA) INJECTION EVERY 8 HOURS
Qty: 0 | Refills: 0 | Status: DISCONTINUED | OUTPATIENT
Start: 2018-10-05 | End: 2018-10-05

## 2018-10-05 RX ORDER — SODIUM CHLORIDE 9 MG/ML
1000 INJECTION INTRAMUSCULAR; INTRAVENOUS; SUBCUTANEOUS ONCE
Qty: 0 | Refills: 0 | Status: DISCONTINUED | OUTPATIENT
Start: 2018-10-05 | End: 2018-10-05

## 2018-10-05 RX ORDER — POTASSIUM CHLORIDE 20 MEQ
40 PACKET (EA) ORAL ONCE
Qty: 0 | Refills: 0 | Status: COMPLETED | OUTPATIENT
Start: 2018-10-05 | End: 2018-10-05

## 2018-10-05 RX ORDER — POTASSIUM CHLORIDE 20 MEQ
10 PACKET (EA) ORAL
Qty: 0 | Refills: 0 | Status: COMPLETED | OUTPATIENT
Start: 2018-10-05 | End: 2018-10-05

## 2018-10-05 RX ORDER — HYDRALAZINE HCL 50 MG
10 TABLET ORAL ONCE
Qty: 0 | Refills: 0 | Status: COMPLETED | OUTPATIENT
Start: 2018-10-05 | End: 2018-10-05

## 2018-10-05 RX ADMIN — Medication 100 MILLIGRAM(S): at 05:44

## 2018-10-05 RX ADMIN — Medication 40 MILLIEQUIVALENT(S): at 01:00

## 2018-10-05 RX ADMIN — OXYCODONE HYDROCHLORIDE 5 MILLIGRAM(S): 5 TABLET ORAL at 20:45

## 2018-10-05 RX ADMIN — Medication 100 MILLIGRAM(S): at 15:06

## 2018-10-05 RX ADMIN — FAMOTIDINE 20 MILLIGRAM(S): 10 INJECTION INTRAVENOUS at 05:44

## 2018-10-05 RX ADMIN — OXYCODONE HYDROCHLORIDE 5 MILLIGRAM(S): 5 TABLET ORAL at 11:18

## 2018-10-05 RX ADMIN — Medication 10 MILLIGRAM(S): at 16:42

## 2018-10-05 RX ADMIN — SODIUM CHLORIDE 125 MILLILITER(S): 9 INJECTION INTRAMUSCULAR; INTRAVENOUS; SUBCUTANEOUS at 08:28

## 2018-10-05 RX ADMIN — Medication 100 MILLIEQUIVALENT(S): at 04:19

## 2018-10-05 RX ADMIN — Medication 250 MILLIGRAM(S): at 18:28

## 2018-10-05 RX ADMIN — Medication 20 MILLIGRAM(S): at 12:58

## 2018-10-05 RX ADMIN — Medication 975 MILLIGRAM(S): at 06:15

## 2018-10-05 RX ADMIN — FAMOTIDINE 20 MILLIGRAM(S): 10 INJECTION INTRAVENOUS at 18:29

## 2018-10-05 RX ADMIN — OXYCODONE HYDROCHLORIDE 2.5 MILLIGRAM(S): 5 TABLET ORAL at 15:13

## 2018-10-05 RX ADMIN — SODIUM CHLORIDE 500 MILLILITER(S): 9 INJECTION INTRAMUSCULAR; INTRAVENOUS; SUBCUTANEOUS at 08:32

## 2018-10-05 RX ADMIN — OXYCODONE HYDROCHLORIDE 5 MILLIGRAM(S): 5 TABLET ORAL at 12:18

## 2018-10-05 RX ADMIN — Medication 0.5 MILLIGRAM(S): at 02:28

## 2018-10-05 RX ADMIN — Medication 40 MILLIEQUIVALENT(S): at 04:53

## 2018-10-05 RX ADMIN — Medication 975 MILLIGRAM(S): at 05:45

## 2018-10-05 RX ADMIN — Medication 100 MILLIEQUIVALENT(S): at 06:02

## 2018-10-05 RX ADMIN — Medication 975 MILLIGRAM(S): at 16:37

## 2018-10-05 RX ADMIN — OXYCODONE HYDROCHLORIDE 2.5 MILLIGRAM(S): 5 TABLET ORAL at 14:11

## 2018-10-05 RX ADMIN — Medication 100 MILLIEQUIVALENT(S): at 07:16

## 2018-10-05 RX ADMIN — Medication 250 MILLIGRAM(S): at 05:30

## 2018-10-05 RX ADMIN — Medication 975 MILLIGRAM(S): at 15:07

## 2018-10-05 RX ADMIN — SODIUM CHLORIDE 125 MILLILITER(S): 9 INJECTION INTRAMUSCULAR; INTRAVENOUS; SUBCUTANEOUS at 02:26

## 2018-10-05 RX ADMIN — OXYCODONE HYDROCHLORIDE 5 MILLIGRAM(S): 5 TABLET ORAL at 20:15

## 2018-10-05 RX ADMIN — SODIUM CHLORIDE 50 MILLILITER(S): 9 INJECTION, SOLUTION INTRAVENOUS at 20:10

## 2018-10-05 NOTE — CONSULT NOTE ADULT - ASSESSMENT
78 year old female PMHX of anxiety, HLD, HTN, B/l TKA, depression, and Sjogren's syndrome presenting with sepsis from left knee periprosthetic infection.     PLAN    Neuro: Anxiety, pain control  - Continue tylenol and oxy PRN  - Continue PRN xanax for anxiety     Respiratory: No active issues  - F/u am CXR    CV: Hypotension responsive to fluids; sepsis   - Continue statin  - Monitor vital signs    GI: No active issues  - NPO     Renal: Hponatremia  - NS @ 125ml/hr    Heme: No active issues  - Holding chemical VTE prophylaxis for OR    ID: Sepsis, left knee periporesthetic septic arthritis  - Continue vancomycin  - F/u BCx    Endo: No active issues  - Monitor serum glucose on BMP    Dispo: SICU full code    - Michi Causey PA-C

## 2018-10-05 NOTE — CONSULT NOTE ADULT - ASSESSMENT
78yFemale w/ hx of Sjogren's syndrome, HTN, HLD, BL TKA (2008 with Dr. Dorado, [facility]) and depression presented to ED c/o L knee pain for 4 days with +fever and chills, found to have septic joint c/b sepsis 2/2 MSSA bacteremia, now pending L knee I&D vs revision, currently hemodynamically stable on Cefazolin 78Fw/ hx of Sjogren's syndrome, HTN, HLD, BL TKA (2008 with Dr. Dorado, [facility]) and depression presented to ED c/o L knee pain for 4 days with +fever and chills, found to have S/aureus septic joint c/b sepsis 2/2 MSSA bacteremia, now pending  L knee I&D, PE exchange vs. stage 1 revision TKA with antibiotic spacer placement, currently hemodynamically stable on Vancomycin.        #MSSA Bacteremia   Bcx 10/4 growing S. aureus c/w Knee aspiration cx. Sepsis has resolved, pt hemodynamically stable with low grade fever overnight. S/p Cefazolin, ceftriaxone and vancomycin.   - can desescalate vancomycin to  - Repeat blood cx tomorrow am  - source of control; pt schedule for I&D with abx spacer placement today  - 78Fw/ hx of Sjogren's syndrome, HTN, HLD, BL TKA (2008 with Dr. Dorado, [facility]) and depression presented to ED c/o L knee pain for 4 days with +fever and chills, found to have S/aureus septic joint c/b sepsis 2/2 MSSA bacteremia, now pending  L knee I&D, PE exchange vs. stage 1 revision TKA with antibiotic spacer placement, currently hemodynamically stable on Vancomycin.        #MSSA Bacteremia   Bcx 10/4 growing S. aureus c/w Knee aspiration cx. Sepsis has resolved, pt hemodynamically stable with low grade fever overnight. S/p Cefazolin, ceftriaxone and vancomycin.   - can desescalate vancomycin to cefazolin 2g q8h. Will likely need abx for 6 weeks   - Repeat blood cx q48h until negative.  - source of control with I&D and abx spacer placement today    Artis Barry MD  Internal Medicine PGY-1  [facility] Pager [phone], [facility] pager [phone]  On weekdays after 7pm and weekends after 12pm, please contact [phone] 78Fw/ hx of Sjogren's syndrome, HTN, HLD, BL TKA (2008 with Dr. Dorado, [facility]) and depression presented to ED c/o L knee pain for 4 days with +fever and chills, found to have S/aureus septic joint c/b sepsis 2/2 MSSA bacteremia, now pending  L knee I&D, PE exchange vs. stage 1 revision TKA with antibiotic spacer placement, currently hemodynamically stable on Vancomycin.        #MSSA Bacteremia   Bcx 10/4 growing S. aureus c/w Knee aspiration cx. Sepsis has resolved, pt hemodynamically stable with low grade fever overnight. S/p Cefazolin, ceftriaxone and vancomycin.   - can desescalate vancomycin to cefazolin 2g q8h. Will likely need abx for 6 weeks   - Repeat blood cx q48h until bcx neg x2  - source control with I&D and abx spacer placement today    rAtis Barry MD  Internal Medicine PGY-1  [facility] Pager [phone], [facility] pager [phone]  On weekdays after 7pm and weekends after 12pm, please contact [phone]

## 2018-10-05 NOTE — PROGRESS NOTE ADULT - SUBJECTIVE AND OBJECTIVE BOX
Ortho Progress Note    S: Patient seen and examined. Elevated temp to 100.1 overnight. Pain well controlled. Denies F/C/CP/SOB. NPO for possible OR today.      O:  Physical Exam:  Gen: Laying in bed, NAD, alert and oriented.   Resp: Unlabored breathing  Ext: EHL/FHL/TA/Sol intact          + SILT DP/SP/HERNANDEZ/Sa          +DP, extremity WWP  L knee erythematous, moderate joint effusion, excoriations overlying skin. Decreased ROM 2/2 knee pain.     Vital Signs Last 24 Hrs  T(C): 37.2 (05 Oct 2018 03:00), Max: 39.5 (04 Oct 2018 18:47)  T(F): 98.9 (05 Oct 2018 03:00), Max: 103.1 (04 Oct 2018 18:47)  HR: 87 (05 Oct 2018 06:00) (82 - 118)  BP: 167/74 (05 Oct 2018 06:00) (90/50 - 168/77)  BP(mean): 107 (05 Oct 2018 06:00) (90 - 110)  RR: 25 (05 Oct 2018 06:00) (18 - 25)  SpO2: 100% (05 Oct 2018 06:00) (97% - 100%)                          11.4   7.4   )-----------( 167      ( 05 Oct 2018 02:29 )             33.5                         12.2   7.1   )-----------( 183      ( 04 Oct 2018 15:45 )             37.2       10-05    130<L>  |  98  |  11  ----------------------------<  134<H>  3.2<L>   |  21<L>  |  0.71        PT/INR - ( 05 Oct 2018 02:29 )   PT: 15.2 sec;   INR: 1.39 ratio         PTT - ( 05 Oct 2018 02:29 )  PTT:32.1 sec    Culture - Body Fluid with Gram Stain (10.04.18 @ 05:44)    Gram Stain:   polymorphonuclear leukocytes seen  Gram Positive Cocci in Clusters seen  Gram positive cocci in pairs seen  by cytocentrifuge    Specimen Source: .Body Fluid Knee Fluid          A/P  78y Female w/ L Knee prosthetic joint infection, growing Gram + cocci. Patient is currently NPO for stage 1 revision. Patient may go to OR today, she is booked and consensted. She is in SICU for monitoring 2/2 to sepsis.    -OR for stage 1 revision with Dr. Montoya, possibly today.   -f/u joint fluid cultures. Growing gram + cocci in clusters.  -c/w NPO  -NWB LLE  -DVT Ppx  -Dispo

## 2018-10-05 NOTE — CONSULT NOTE ADULT - SUBJECTIVE AND OBJECTIVE BOX
HPI:  78yFemale w/ hx of Sjogren's syndrome, HTN, HLD, BL TKA ( with Dr. Dorado, [facility]) and depression presented to ED c/o L knee pain for 4 days with +fever and chills. She reports pain to be nonradiating and worse with walking but can still bear weight. She denies numbness, tingling, or burning in the LLE. Fell 2 weeks ago but was still ambulating.    Pt was seen at [facility] ED last night for left knee pain and fever's, was diagnosed with infected L knee joint after arthrocentesis (+GPC) and was advised to stay for admission but left AMA after advising ED staff she would go to [facility]. Went home in AM and continued to having fevers at home with increasing pain in L knee so came back. Fever tmax ~104 at home rectally. No dizziness, no chest pain, no n/v, no weakness, no numbness/tingling.    ED course:   103.1F , /71 sPo2 97% on RA   Labs: CBC wnl WBC 7 w/ 86% PMN, Na 129, K 3.0, Lactate 2.9, ESR 87, CRP 35 U/A neg for UTI  Aspirate cx +GPC in pairs and in clusters; BCx 10/4 +S.aureus   CXR clear and Knee Xray negative for fracture or dislocation   Received Cefazolin 2g x 1, Ceftriaxone 1g x1 and vancomycin 1g x1   Pt became tachycardic and hypotensive in ED but hypotension responded to 1L of crystalloid SBP 90s to  with resolution of tachycardia. Fever broke after Tylenol.    INTERVAL HISTORY:  - 100.1F fever overnight   - Admitted to SICU given  tachycardia and hypotension concerning for septic shock.   - NPO for possible OR today for L knee I&D, PE exchange vs. stage 1 revision TKA with antibiotic spacer placement   - Cardiology preop assessment elevated risk but no absolute contraindication to proceed to OR. Recommend PEACE to rule out IE        REVIEW OF SYSTEMS  All review of systems negative, except for those marked:  General:		[] Abnormal:  	[] Night Sweats		[] Fever		[] Weight Loss  Pulmonary/Cough:	[] Abnormal:  Cardiac/Chest Pain:	[] Abnormal:  Gastrointestinal:   	[] Abnormal:  Eyes:			        [] Abnormal:  ENT:		         	[] Abnormal:  Dysuria:		                [] Abnormal:  Musculoskeletal	:	[] Abnormal:  Endocrine:		        [] Abnormal:  Lymph Nodes:		[] Abnormal:  Headache:		        [] Abnormal:  Skin:			        [] Abnormal:  Allergy/Immune:       	[] Abnormal:  Psychiatric:		        [] Abnormal:  [] All other review of systems negative  [] Unable to obtain (explain):    Recent Ill Contacts:	[] No	[] Yes:  Recent Travel History:	[] No	[] Yes:  Recent Animal/Insect Exposure/Tick Bites:	[] No	[] Yes:    Allergies: No Known Allergies      Home Medications:  atorvastatin 10 mg oral tablet: 1 tab(s) orally once a day (04 Oct 2018 22:38)  furosemide 20 mg oral tablet: 1 tab(s) orally 2 times a day (04 Oct 2018 22:38)  Paxil 20 mg oral tablet: 1 tab(s) orally once a day (04 Oct 2018 22:38)  Xanax 0.5 mg oral tablet: 1 tab(s) orally 2 times a day, As Needed  (04 Oct 2018 22:38)  zolpidem 10 mg oral tablet: 1 tab(s) orally once a day (at bedtime), As Needed (04 Oct 2018 22:38)      Antimicrobials:  vancomycin  IVPB 1000 milliGRAM(s) IV Intermittent every 12 hours      Other Medications:  acetaminophen   Tablet .. 975 milliGRAM(s) Oral every 8 hours  ALPRAZolam 0.5 milliGRAM(s) Oral two times a day PRN  atorvastatin 10 milliGRAM(s) Oral at bedtime  docusate sodium 100 milliGRAM(s) Oral three times a day  famotidine    Tablet 20 milliGRAM(s) Oral every 12 hours  magnesium hydroxide Suspension 30 milliLiter(s) Oral daily PRN  oxyCODONE    IR 2.5 milliGRAM(s) Oral every 4 hours PRN  oxyCODONE    IR 5 milliGRAM(s) Oral every 4 hours PRN  PARoxetine 20 milliGRAM(s) Oral daily  senna 2 Tablet(s) Oral at bedtime  sodium chloride 0.9%. 1000 milliLiter(s) IV Continuous <Continuous>      FAMILY HISTORY: Non contributory     PAST MEDICAL & SURGICAL HISTORY:  Hyperlipemia  Anxiety  H/O total knee replacement: bilateral  History of appendectomy  S/P cataract surgery    SOCIAL HISTORY:          Head Circumference:  Vital Signs Last 24 Hrs  T(C): 37.4 (05 Oct 2018 08:00), Max: 39.5 (04 Oct 2018 18:47)  T(F): 99.3 (05 Oct 2018 08:00), Max: 103.1 (04 Oct 2018 18:47)  HR: 81 (05 Oct 2018 09:00) (81 - 118)  BP: 129/63 (05 Oct 2018 09:00) (90/50 - 168/77)  BP(mean): 90 (05 Oct 2018 09:00) (77 - 110)  RR: 27 (05 Oct 2018 09:00) (18 - 27)  SpO2: 100% (05 Oct 2018 09:00) (97% - 100%)    PHYSICAL EXAM    Respiratory Support:		[] No	[] Yes:  Vasoactive medication infusion:	[] No	[] Yes:  Venous catheters:		[] No	[] Yes:  Bladder catheter:		        [] No	[] Yes:  Other catheters or tubes:	[] No	[] Yes:    PHYSICAL EXAM:  GENERAL: NAD, well-developed  HEAD:  Atraumatic, Normocephalic  EYES: EOMI, PERRLA, conjunctiva and sclera clear  NECK: Supple, No JVD  CHEST/LUNG: Clear to auscultation bilaterally; No wheeze  HEART: Regular rate and rhythm; No murmurs, rubs, or gallops  ABDOMEN: Soft, Nontender, Nondistended; Bowel sounds present  EXTREMITIES:  2+ Peripheral Pulses, No clubbing, cyanosis, or edema    PSYCH: AAOx3  NEUROLOGY: non-focal  SKIN: No rashes or lesions      Lab Results:                        11.4   7.4   )-----------( 167      ( 05 Oct 2018 02:29 )             33.5     10-05    130<L>  |  98  |  11  ----------------------------<  134<H>  3.2<L>   |  21<L>  |  0.71    Ca    8.3<L>      05 Oct 2018 02:29    TPro  6.1  /  Alb  2.5<L>  /  TBili  0.3  /  DBili  x   /  AST  27  /  ALT  33  /  AlkPhos  120  10-05    LIVER FUNCTIONS - ( 05 Oct 2018 02:29 )  Alb: 2.5 g/dL / Pro: 6.1 g/dL / ALK PHOS: 120 U/L / ALT: 33 U/L / AST: 27 U/L / GGT: x           PT/INR - ( 05 Oct 2018 02:29 )   PT: 15.2 sec;   INR: 1.39 ratio         PTT - ( 05 Oct 2018 02:29 )  PTT:32.1 sec        Culture - Blood (collected 04 Oct 2018 17:24)  Source: .Blood Blood-Peripheral  Gram Stain (05 Oct 2018 08:38):    Growth in aerobic bottle: Gram Positive Cocci in Clusters    Growth in anaerobic bottle: Gram Positive Cocci in Clusters  Preliminary Report (05 Oct 2018 08:38):    Growth in aerobic bottle: Gram Positive Cocci in Clusters    Growth in anaerobic bottle: Gram Positive Cocci in Clusters    Culture - Body Fluid with Gram Stain (collected 04 Oct 2018 05:44)  Source: .Body Fluid Knee Fluid  Gram Stain (04 Oct 2018 06:43):    polymorphonuclear leukocytes seen    Gram Positive Cocci in Clusters seen    Gram positive cocci in pairs seen    by cytocentrifuge  Preliminary Report (05 Oct 2018 09:06):    Moderate Staphylococcus aureus      Culture - Blood (collected 03 Oct 2018 21:52)  Source: .Blood Blood-Peripheral  Gram Stain (04 Oct 2018 13:27):    Growth in aerobic bottle: Gram Positive Cocci in Clusters    Growth in anaerobic bottle: Gram Positive Cocci in Clusters  Preliminary Report (04 Oct 2018 13:27):    Growth in aerobic bottle: Gram Positive Cocci in Clusters    Growth in anaerobic bottle: Gram Positive Cocci in Clusters    Urinalysis Basic - ( 03 Oct 2018 23:50 )    Color: Yellow / Appearance: Slightly Turbid / S.025 / pH: x  Gluc: x / Ketone: Small  / Bili: Negative / Urobili: Negative   Blood: x / Protein: 100 mg/dL / Nitrite: Negative   Leuk Esterase: Small / RBC: 6 /hpf / WBC 12 /hpf   Sq Epi: x / Non Sq Epi: 7 /hpf / Bacteria: Negative    < from: Xray Tibia + Fibula 2 Views, Left (10.03.18 @ 19:15) >  LEFT KNEE:    The patient is status post total left knee arthroplasty with cemented   components.    Lucency about the cement bone interface of the tibial component likely   reflects osteopenia from stress shielding. Osteolysis could have a   similar appearance in the proper clinical context that this is a clinical   concern then CT or MRI can be performed to further characterize this   finding. No acute periprosthetic fracture is identified.    LEFT TIBIA FIBULA:    No acute fracture or dislocation. HPI:  78yFemale w/ hx of Sjogren's syndrome, HTN, HLD, BL b/l TKA ( with Dr. Dorado, [facility]) and depression presented to ED c/o L knee pain for 4 days with +fever and chills. She was in her usual state of health until last  when she started having worsening knee pain and low grade fevers at home. She reports pain to be nonradiating and worse with walking but was able to bear weight. She had a minor fall 2 weeks prior with superficial skin abrasions, but denies any penetrating trauma. She did not have numbness, tingling, or burning in the LLE.     She presented to [facility] ED on Wed night for left knee pain and fevers, was diagnosed with infected L knee joint after arthrocentesis came back +GPC. After discussing her surgical options, she got scared but left AMA to go get a second opinion at [facility]. Since her orthopedist was not available and she continued to having fevers at home with increasing pain in L knee, she came back. She reports Fever tmax ~104 at home rectally. No dizziness, no chest pain, no n/v, no weakness, no numbness/tingling at that time.     ED course:   103.1F , /71 sPo2 97% on RA   Labs: CBC wnl WBC 7 w/ 86% PMN, Na 129, K 3.0, Lactate 2.9, ESR 87, CRP 35 U/A neg for UTI  Aspirate cx +GPC in pairs and in clusters; BCx 10/4 +S.aureus   CXR clear and Knee Xray negative for fracture or dislocation   Received Cefazolin 2g x 1, Ceftriaxone 1g x1 and vancomycin 1g x1   Pt became tachycardic and hypotensive in ED but hypotension responded to 1L of crystalloid SBP 90s to  with resolution of tachycardia. Fever broke after Tylenol.    INTERVAL HISTORY:  - Admitted to SICU given  tachycardia and hypotension concerning for septic shock.   -currently on vancomycin 1g q12h and LR 50cc/hr for 1L   - 100.1F fever overnight. This am, reports 6-7/10 LLE pain and right back pain/discomfort.   - NPO for possible OR today at 5PM for L knee I&D, PE exchange vs. stage 1 revision TKA with antibiotic spacer placement   - Cardiology preop assessment elevated risk but no absolute contraindication to proceed to OR. Recommend PEACE to rule out IE    REVIEW OF SYSTEMS  All review of systems negative, except for those marked:  General:		[] Abnormal:  	[] Night Sweats		[x] Fever		[] Weight Loss  Pulmonary/Cough:	[] Abnormal:  Cardiac/Chest Pain:	[] Abnormal:  Gastrointestinal:   	[] Abnormal:  Eyes:			        [] Abnormal:  ENT:		         	[] Abnormal:  Dysuria:		                [] Abnormal:  Musculoskeletal	:	[] Abnormal:  Endocrine:		        [] Abnormal:  Lymph Nodes:		[] Abnormal:  Headache:		        [] Abnormal:  Skin:			        [] Abnormal:  Allergy/Immune:       	[] Abnormal:  Psychiatric:		        [] Abnormal:  [x] All other review of systems negative      Recent Ill Contacts:	[] No	[] Yes:  Recent Travel History:	[] No	[] Yes:  Recent Animal/Insect Exposure/Tick Bites:	[] No	[] Yes:    Allergies: No Known Allergies    Home Medications:  atorvastatin 10 mg oral tablet: 1 tab(s) orally once a day (04 Oct 2018 22:38)  furosemide 20 mg oral tablet: 1 tab(s) orally 2 times a day (04 Oct 2018 22:38)  Paxil 20 mg oral tablet: 1 tab(s) orally once a day (04 Oct 2018 22:38)  Xanax 0.5 mg oral tablet: 1 tab(s) orally 2 times a day, As Needed  (04 Oct 2018 22:38)  zolpidem 10 mg oral tablet: 1 tab(s) orally once a day (at bedtime), As Needed (04 Oct 2018 22:38)    Antimicrobials:  vancomycin  IVPB 1000 milliGRAM(s) IV Intermittent every 12 hours    Other Medications:  acetaminophen   Tablet .. 975 milliGRAM(s) Oral every 8 hours  ALPRAZolam 0.5 milliGRAM(s) Oral two times a day PRN  atorvastatin 10 milliGRAM(s) Oral at bedtime  docusate sodium 100 milliGRAM(s) Oral three times a day  famotidine    Tablet 20 milliGRAM(s) Oral every 12 hours  magnesium hydroxide Suspension 30 milliLiter(s) Oral daily PRN  oxyCODONE    IR 2.5 milliGRAM(s) Oral every 4 hours PRN  oxyCODONE    IR 5 milliGRAM(s) Oral every 4 hours PRN  PARoxetine 20 milliGRAM(s) Oral daily  senna 2 Tablet(s) Oral at bedtime  sodium chloride 0.9%. 1000 milliLiter(s) IV Continuous <Continuous>      FAMILY HISTORY: Non contributory     PAST MEDICAL & SURGICAL HISTORY:  Hyperlipemia  Anxiety  H/O total knee replacement: bilateral  History of appendectomy  S/P cataract surgery    SOCIAL HISTORY: Retired, Lives at home with .     Head Circumference:  Vital Signs Last 24 Hrs  T(C): 37.4 (05 Oct 2018 08:00), Max: 39.5 (04 Oct 2018 18:47)  T(F): 99.3 (05 Oct 2018 08:00), Max: 103.1 (04 Oct 2018 18:47)  HR: 81 (05 Oct 2018 09:00) (81 - 118)  BP: 129/63 (05 Oct 2018 09:00) (90/50 - 168/77)  BP(mean): 90 (05 Oct 2018 09:00) (77 - 110)  RR: 27 (05 Oct 2018 09:00) (18 - 27)  SpO2: 100% (05 Oct 2018 09:00) (97% - 100%)    PHYSICAL EXAM    Respiratory Support:		[x] No	[] Yes:  Vasoactive medication infusion:	[x] No	[] Yes:  Venous catheters:		[x] No	[] Yes:  Bladder catheter:		 [x] No	[] Yes:  Other catheters or tubes:	[x] No	[] Yes:    PHYSICAL EXAM:  GENERAL: NAD, well-developed  HEAD:  Atraumatic, Normocephalic  EYES: EOMI, PERRLA, conjunctiva and sclera clear  NECK: Supple, No JVD  CHEST/LUNG: Clear to auscultation bilaterally; No wheeze  HEART: Regular rate and rhythm; No murmurs, rubs, or gallops  ABDOMEN: Soft, Nontender, Nondistended; Bowel sounds present  EXTREMITIES:     PSYCH: AAOx3  NEUROLOGY: non-focal  SKIN: No rashes or lesions    Lab Results:                        11.4   7.4   )-----------( 167      ( 05 Oct 2018 02:29 )             33.5     10-05    130<L>  |  98  |  11  ----------------------------<  134<H>  3.2<L>   |  21<L>  |  0.71    Ca    8.3<L>      05 Oct 2018 02:29    TPro  6.1  /  Alb  2.5<L>  /  TBili  0.3  /  DBili  x   /  AST  27  /  ALT  33  /  AlkPhos  120  10-05    LIVER FUNCTIONS - ( 05 Oct 2018 02:29 )  Alb: 2.5 g/dL / Pro: 6.1 g/dL / ALK PHOS: 120 U/L / ALT: 33 U/L / AST: 27 U/L / GGT: x           PT/INR - ( 05 Oct 2018 02:29 )   PT: 15.2 sec;   INR: 1.39 ratio         PTT - ( 05 Oct 2018 02:29 )  PTT:32.1 sec    Blood cx:   Gram Positive Cocci in Clusters seen  Body Fluid knee cx:   Gram Positive Cocci in pairs and Clusters seen      Urinalysis Basic - ( 03 Oct 2018 23:50 )    Color: Yellow / Appearance: Slightly Turbid / S.025 / pH: x  Gluc: x / Ketone: Small  / Bili: Negative / Urobili: Negative   Blood: x / Protein: 100 mg/dL / Nitrite: Negative   Leuk Esterase: Small / RBC: 6 /hpf / WBC 12 /hpf   Sq Epi: x / Non Sq Epi: 7 /hpf / Bacteria: Negative    < from: Xray Tibia + Fibula 2 Views, Left (10.03.18 @ 19:15) >  LEFT KNEE:    The patient is status post total left knee arthroplasty with cemented   components.    Lucency about the cement bone interface of the tibial component likely   reflects osteopenia from stress shielding. Osteolysis could have a   similar appearance in the proper clinical context that this is a clinical   concern then CT or MRI can be performed to further characterize this   finding. No acute periprosthetic fracture is identified.    LEFT TIBIA FIBULA:    No acute fracture or dislocation. HPI:  78yFemale w/ hx of Sjogren's syndrome, HTN, HLD, BL b/l TKA ( with Dr. Dorado, [facility]") and depression presented to ED c/o L knee pain for 4 days with +fever and chills. She was in her usual state of health until last  when she started having worsening knee pain and low grade fevers at home. She reports pain to be nonradiating and worse with walking but was able to bear weight. She had a minor fall 2 weeks prior with superficial skin abrasions, but denies any penetrating trauma. She did not have numbness, tingling, or burning in the LLE.     She presented to [facility] ED on Wed night for left knee pain and fevers, was diagnosed with infected L knee joint after arthrocentesis came back +GPC. After discussing her surgical options, she got scared but left AMA to go get a second opinion at \[facility]". Since her orthopedist was not available and she continued to having fevers at home with increasing pain in L knee, she came back. She reports Fever tmax ~104 at home rectally. No dizziness, no chest pain, no n/v, no weakness, no numbness/tingling at that time.     ED course:   103.1F , /71 sPo2 97% on RA   Labs: CBC wnl WBC 7 w/ 86% PMN, Na 129, K 3.0, Lactate 2.9, ESR 87, CRP 35 U/A neg for UTI  Aspirate cx +GPC in pairs and in clusters; BCx 10/4 +S.aureus   CXR clear and Knee Xray negative for fracture or dislocation   Received Cefazolin 2g x 1, Ceftriaxone 1g x1 and vancomycin 1g x1   Pt became tachycardic and hypotensive in ED but hypotension responded to 1L of crystalloid SBP 90s to  with resolution of tachycardia. Fever broke after Tylenol.    INTERVAL HISTORY:  - Admitted to SICU given  tachycardia and hypotension concerning for septic shock.   -currently on vancomycin 1g q12h and LR 50cc/hr for 1L   - 100.1F fever overnight. This am, reports 6-7/10 LLE pain and right back pain/discomfort.   - NPO for possible OR today at 5PM for L knee I&D, PE exchange vs. stage 1 revision TKA with antibiotic spacer placement   - Cardiology preop assessment elevated risk but no absolute contraindication to proceed to OR. Recommend PEACE to rule out IE    REVIEW OF SYSTEMS  All review of systems negative, except for those marked:  General:		[] Abnormal:  	[] Night Sweats		[x] Fever		[] Weight Loss  Pulmonary/Cough:	[] Abnormal:  Cardiac/Chest Pain:	[] Abnormal:  Gastrointestinal:   	[] Abnormal:  Eyes:			        [] Abnormal:  ENT:		         	[] Abnormal:  Dysuria:		                [] Abnormal:  Musculoskeletal	:	[] Abnormal:  Endocrine:		        [] Abnormal:  Lymph Nodes:		[] Abnormal:  Headache:		        [] Abnormal:  Skin:			        [] Abnormal:  Allergy/Immune:       	[] Abnormal:  Psychiatric:		        [] Abnormal:  [x] All other review of systems negative      Recent Ill Contacts:	[] No	[] Yes:  Recent Travel History:	[] No	[] Yes:  Recent Animal/Insect Exposure/Tick Bites:	[] No	[] Yes:    Allergies: No Known Allergies    Home Medications:  atorvastatin 10 mg oral tablet: 1 tab(s) orally once a day (04 Oct 2018 22:38)  furosemide 20 mg oral tablet: 1 tab(s) orally 2 times a day (04 Oct 2018 22:38)  Paxil 20 mg oral tablet: 1 tab(s) orally once a day (04 Oct 2018 22:38)  Xanax 0.5 mg oral tablet: 1 tab(s) orally 2 times a day, As Needed  (04 Oct 2018 22:38)  zolpidem 10 mg oral tablet: 1 tab(s) orally once a day (at bedtime), As Needed (04 Oct 2018 22:38)    Antimicrobials:  vancomycin  IVPB 1000 milliGRAM(s) IV Intermittent every 12 hours    Other Medications:  acetaminophen   Tablet .. 975 milliGRAM(s) Oral every 8 hours  ALPRAZolam 0.5 milliGRAM(s) Oral two times a day PRN  atorvastatin 10 milliGRAM(s) Oral at bedtime  docusate sodium 100 milliGRAM(s) Oral three times a day  famotidine    Tablet 20 milliGRAM(s) Oral every 12 hours  magnesium hydroxide Suspension 30 milliLiter(s) Oral daily PRN  oxyCODONE    IR 2.5 milliGRAM(s) Oral every 4 hours PRN  oxyCODONE    IR 5 milliGRAM(s) Oral every 4 hours PRN  PARoxetine 20 milliGRAM(s) Oral daily  senna 2 Tablet(s) Oral at bedtime  sodium chloride 0.9%. 1000 milliLiter(s) IV Continuous <Continuous>      FAMILY HISTORY: Non contributory     PAST MEDICAL & SURGICAL HISTORY:  Hyperlipemia  Anxiety  H/O total knee replacement: bilateral  History of appendectomy  S/P cataract surgery    SOCIAL HISTORY: Retired, Lives at home with .     Head Circumference:  Vital Signs Last 24 Hrs  T(C): 37.4 (05 Oct 2018 08:00), Max: 39.5 (04 Oct 2018 18:47)  T(F): 99.3 (05 Oct 2018 08:00), Max: 103.1 (04 Oct 2018 18:47)  HR: 81 (05 Oct 2018 09:00) (81 - 118)  BP: 129/63 (05 Oct 2018 09:00) (90/50 - 168/77)  BP(mean): 90 (05 Oct 2018 09:00) (77 - 110)  RR: 27 (05 Oct 2018 09:00) (18 - 27)  SpO2: 100% (05 Oct 2018 09:00) (97% - 100%)    PHYSICAL EXAM    Respiratory Support:		[x] No	[] Yes:  Vasoactive medication infusion:	[x] No	[] Yes:  Venous catheters:		[x] No	[] Yes:  Bladder catheter:		[x] No	[] Yes:  Other catheters or tubes:	[x] No	[] Yes:    PHYSICAL EXAM:  GENERAL: NAD, appears very uncomfortable, restlessness (akathisia-like)  HEAD:  Atraumatic, Normocephalic  EYES: EOMI, PERRLA, conjunctiva and sclera clear  NECK: Supple, No JVD,   CHEST/LUNG: ?basilar crackles bilaterally; No wheeze  HEART: Regular rate and rhythm; No murmurs, rubs, or gallops  ABDOMEN: Soft, Nontender, Nondistended; Bowel sounds present  EXTREMITIES: LLE extremely swollen around knee joint with mild erythema and tenderness to light palpation, especially around prepatella. Small abrasions on prepatella. No exudate. Limited ROM   PSYCH: AAOx3  NEUROLOGY: non-focal  SKIN: No rashes or lesions    Lab Results:                        11.4   7.4   )-----------( 167      ( 05 Oct 2018 02:29 )             33.5     10-05    130<L>  |  98  |  11  ----------------------------<  134<H>  3.2<L>   |  21<L>  |  0.71    Ca    8.3<L>      05 Oct 2018 02:29    TPro  6.1  /  Alb  2.5<L>  /  TBili  0.3  /  DBili  x   /  AST  27  /  ALT  33  /  AlkPhos  120  10-05    LIVER FUNCTIONS - ( 05 Oct 2018 02:29 )  Alb: 2.5 g/dL / Pro: 6.1 g/dL / ALK PHOS: 120 U/L / ALT: 33 U/L / AST: 27 U/L / GGT: x           PT/INR - ( 05 Oct 2018 02:29 )   PT: 15.2 sec;   INR: 1.39 ratio         PTT - ( 05 Oct 2018 02:29 )  PTT:32.1 sec    Blood cx:   Gram Positive Cocci in Clusters seen  Body Fluid knee cx:   Gram Positive Cocci in pairs and Clusters seen      Urinalysis Basic - ( 03 Oct 2018 23:50 )    Color: Yellow / Appearance: Slightly Turbid / S.025 / pH: x  Gluc: x / Ketone: Small  / Bili: Negative / Urobili: Negative   Blood: x / Protein: 100 mg/dL / Nitrite: Negative   Leuk Esterase: Small / RBC: 6 /hpf / WBC 12 /hpf   Sq Epi: x / Non Sq Epi: 7 /hpf / Bacteria: Negative    < from: Xray Tibia + Fibula 2 Views, Left (10.03.18 @ 19:15) >  LEFT KNEE:    The patient is status post total left knee arthroplasty with cemented   components.    Lucency about the cement bone interface of the tibial component likely   reflects osteopenia from stress shielding. Osteolysis could have a   similar appearance in the proper clinical context that this is a clinical   concern then CT or MRI can be performed to further characterize this   finding. No acute periprosthetic fracture is identified.    LEFT TIBIA FIBULA:    No acute fracture or dislocation. HPI:  78yFemale w/ hx of Sjogren's syndrome, HTN, HLD, BL b/l TKA ( with Dr. Dorado, [facility]) and depression presented to ED c/o L knee pain for 4 days with +fever and chills. She was in her usual state of health until last  when she started having worsening knee pain and low grade fevers at home. She reports pain to be nonradiating and worse with walking but was able to bear weight. She had a minor fall 2 weeks prior with superficial skin abrasions, but denies any penetrating trauma. She did not have numbness, tingling, or burning in the LLE.     She presented to [facility] ED on Wed night for left knee pain and fevers, was diagnosed with infected L knee joint after arthrocentesis came back +GPC. After discussing her surgical options, she got scared but left AMA to go get a second opinion at [facility]. Since her orthopedist was not available and she continued to having fevers at home with increasing pain in L knee, she came back. She reports Fever tmax ~104 at home rectally. No dizziness, no chest pain, no n/v, no weakness, no numbness/tingling at that time.     ED course:   103.1F , /71 SpO2 97% on RA   Labs: CBC wnl WBC 7 w/ 86% PMN, Na 129, K 3.0, Lactate 2.9, ESR 87, CRP 35 U/A neg for UTI  Aspirate cx +GPC in pairs and in clusters; BCx 10/4 +S.aureus   CXR clear and Knee Xray negative for fracture or dislocation   Received Cefazolin 2g x 1, Ceftriaxone 1g x1 and vancomycin 1g x1   Pt became tachycardic and hypotensive in ED but hypotension responded to 1L of crystalloid SBP 90s to  with resolution of tachycardia. Fever broke after Tylenol.    INTERVAL HISTORY:  - Admitted to SICU given  tachycardia and hypotension concerning for septic shock.   -currently on vancomycin 1g q12h and LR 50cc/hr for 1L   - 100.1F fever overnight. This am, reports 6-7/10 LLE pain and right back pain/discomfort.   - NPO for possible OR today at 5PM for L knee I&D, PE exchange vs. stage 1 revision TKA with antibiotic spacer placement   - Cardiology preop assessment elevated risk but no absolute contraindication to proceed to OR. Recommend PEACE to rule out IE    REVIEW OF SYSTEMS  All review of systems negative, except for those marked:  General:		[] Abnormal:  	[] Night Sweats		[x] Fever		[] Weight Loss  Pulmonary/Cough:	[] Abnormal:  Cardiac/Chest Pain:	[] Abnormal:  Gastrointestinal:   	[] Abnormal:  Eyes:			        [] Abnormal:  ENT:		         	[] Abnormal:  Dysuria:		                [] Abnormal:  Musculoskeletal	:	[] Abnormal:  Endocrine:		        [] Abnormal:  Lymph Nodes:		[] Abnormal:  Headache:		        [] Abnormal:  Skin:			        [] Abnormal:  Allergy/Immune:       	[] Abnormal:  Psychiatric:		        [] Abnormal:  [x] All other review of systems negative      Recent Ill Contacts:	[] No	[] Yes:  Recent Travel History:	[] No	[] Yes:  Recent Animal/Insect Exposure/Tick Bites:	[] No	[] Yes:    Allergies: No Known Allergies    Home Medications:  atorvastatin 10 mg oral tablet: 1 tab(s) orally once a day (04 Oct 2018 22:38)  furosemide 20 mg oral tablet: 1 tab(s) orally 2 times a day (04 Oct 2018 22:38)  Paxil 20 mg oral tablet: 1 tab(s) orally once a day (04 Oct 2018 22:38)  Xanax 0.5 mg oral tablet: 1 tab(s) orally 2 times a day, As Needed  (04 Oct 2018 22:38)  zolpidem 10 mg oral tablet: 1 tab(s) orally once a day (at bedtime), As Needed (04 Oct 2018 22:38)    Antimicrobials:  vancomycin  IVPB 1000 milliGRAM(s) IV Intermittent every 12 hours    Other Medications:  acetaminophen   Tablet .. 975 milliGRAM(s) Oral every 8 hours  ALPRAZolam 0.5 milliGRAM(s) Oral two times a day PRN  atorvastatin 10 milliGRAM(s) Oral at bedtime  docusate sodium 100 milliGRAM(s) Oral three times a day  famotidine    Tablet 20 milliGRAM(s) Oral every 12 hours  magnesium hydroxide Suspension 30 milliLiter(s) Oral daily PRN  oxyCODONE    IR 2.5 milliGRAM(s) Oral every 4 hours PRN  oxyCODONE    IR 5 milliGRAM(s) Oral every 4 hours PRN  PARoxetine 20 milliGRAM(s) Oral daily  senna 2 Tablet(s) Oral at bedtime  sodium chloride 0.9%. 1000 milliLiter(s) IV Continuous <Continuous>      FAMILY HISTORY: Non contributory     PAST MEDICAL & SURGICAL HISTORY:  Hyperlipemia  Anxiety  H/O total knee replacement: bilateral  History of appendectomy  S/P cataract surgery    SOCIAL HISTORY: Retired, Lives at home with .     Head Circumference:  Vital Signs Last 24 Hrs  T(C): 37.4 (05 Oct 2018 08:00), Max: 39.5 (04 Oct 2018 18:47)  T(F): 99.3 (05 Oct 2018 08:00), Max: 103.1 (04 Oct 2018 18:47)  HR: 81 (05 Oct 2018 09:00) (81 - 118)  BP: 129/63 (05 Oct 2018 09:00) (90/50 - 168/77)  BP(mean): 90 (05 Oct 2018 09:00) (77 - 110)  RR: 27 (05 Oct 2018 09:00) (18 - 27)  SpO2: 100% (05 Oct 2018 09:00) (97% - 100%)    PHYSICAL EXAM    Respiratory Support:		[x] No	[] Yes:  Vasoactive medication infusion:	[x] No	[] Yes:  Venous catheters:		[x] No	[] Yes:  Bladder catheter:		[x] No	[] Yes:  Other catheters or tubes:	[x] No	[] Yes:    PHYSICAL EXAM:  GENERAL: NAD, appears very uncomfortable, restlessness (akathisia-like)  HEAD:  Atraumatic, Normocephalic  EYES: EOMI, PERRLA, conjunctiva and sclera clear  NECK: Supple, No JVD,   CHEST/LUNG: ?basilar crackles bilaterally; No wheeze  HEART: Regular rate and rhythm; No murmurs, rubs, or gallops  ABDOMEN: Soft, Nontender, Nondistended; Bowel sounds present  EXTREMITIES: LLE extremely swollen around knee joint with mild erythema and tenderness to light palpation, especially around prepatella. Small abrasions on prepatella. No exudate. Limited ROM   PSYCH: AAOx3  NEUROLOGY: non-focal  SKIN: No rashes or lesions    Lab Results:                        11.4   7.4   )-----------( 167      ( 05 Oct 2018 02:29 )             33.5     10-05    130<L>  |  98  |  11  ----------------------------<  134<H>  3.2<L>   |  21<L>  |  0.71    Ca    8.3<L>      05 Oct 2018 02:29    TPro  6.1  /  Alb  2.5<L>  /  TBili  0.3  /  DBili  x   /  AST  27  /  ALT  33  /  AlkPhos  120  10-05    LIVER FUNCTIONS - ( 05 Oct 2018 02:29 )  Alb: 2.5 g/dL / Pro: 6.1 g/dL / ALK PHOS: 120 U/L / ALT: 33 U/L / AST: 27 U/L / GGT: x           PT/INR - ( 05 Oct 2018 02:29 )   PT: 15.2 sec;   INR: 1.39 ratio         PTT - ( 05 Oct 2018 02:29 )  PTT:32.1 sec    Blood cx:   Gram Positive Cocci in Clusters seen  Body Fluid knee cx:   Gram Positive Cocci in pairs and Clusters seen      Urinalysis Basic - ( 03 Oct 2018 23:50 )    Color: Yellow / Appearance: Slightly Turbid / S.025 / pH: x  Gluc: x / Ketone: Small  / Bili: Negative / Urobili: Negative   Blood: x / Protein: 100 mg/dL / Nitrite: Negative   Leuk Esterase: Small / RBC: 6 /hpf / WBC 12 /hpf   Sq Epi: x / Non Sq Epi: 7 /hpf / Bacteria: Negative    < from: Xray Tibia + Fibula 2 Views, Left (10.03.18 @ 19:15) >  LEFT KNEE:    The patient is status post total left knee arthroplasty with cemented   components.    Lucency about the cement bone interface of the tibial component likely   reflects osteopenia from stress shielding. Osteolysis could have a   similar appearance in the proper clinical context that this is a clinical   concern then CT or MRI can be performed to further characterize this   finding. No acute periprosthetic fracture is identified.    LEFT TIBIA FIBULA:    No acute fracture or dislocation. HPI:  78yFemale w/ hx of Sjogren's syndrome, HTN, HLD, BL b/l TKA ( with Dr. Dorado, [facility]) and depression presented to ED c/o L knee pain for 4 days with +fever and chills. She was in her usual state of health until last  when she started having worsening knee pain and low grade fevers at home. She reports pain to be nonradiating and worse with walking but was able to bear weight. She had a minor fall 2 weeks prior with superficial skin abrasions, but denies any penetrating trauma. She did not have numbness, tingling, or burning in the LLE.     She presented to [facility] ED on Wed night for left knee pain and fevers, was diagnosed with infected L knee joint after arthrocentesis came back +GPC. After discussing her surgical options, she got scared but left AMA to go get a second opinion at [facility]. Since her orthopedist was not available and she continued to having fevers at home with increasing pain in L knee, she came back. She reports Fever tmax ~104 at home rectally. No dizziness, no chest pain, no n/v, no weakness, no numbness/tingling at that time.     ED course:   103.1F , /71 SpO2 97% on RA   Labs: CBC wnl WBC 7 w/ 86% PMN, Na 129, K 3.0, Lactate 2.9, ESR 87, CRP 35 U/A neg for UTI  Aspirate cx +GPC in pairs and in clusters; BCx 10/4 +S.aureus   CXR clear and Knee Xray negative for fracture or dislocation   Received Cefazolin 2g x 1, Ceftriaxone 1g x1 and vancomycin 1g x1   Pt became tachycardic and hypotensive in ED but hypotension responded to 1L of crystalloid SBP 90s to  with resolution of tachycardia. Fever broke after Tylenol.    INTERVAL HISTORY:  - Admitted to SICU given  tachycardia and hypotension concerning for septic shock.   -currently on vancomycin 1g q12h and LR 50cc/hr for 1L   - 100.1F fever overnight. This am, reports 6-7/10 LLE pain and right back pain/discomfort.   - NPO for possible OR today at 5PM for L knee I&D, PE exchange vs. stage 1 revision TKA with antibiotic spacer placement   - Cardiology preop assessment elevated risk but no absolute contraindication to proceed to OR. Recommend PEACE to rule out IE    REVIEW OF SYSTEMS  All review of systems negative, except for those marked:  General:		[] Abnormal:  	[] Night Sweats		[x] Fever		[] Weight Loss  Pulmonary/Cough:	[] Abnormal:  Cardiac/Chest Pain:	[] Abnormal:  Gastrointestinal:   	[] Abnormal:  Eyes:			        [] Abnormal:  ENT:		         	[] Abnormal:  Dysuria:		                [] Abnormal:  Musculoskeletal	:	[] Abnormal:  Endocrine:		        [] Abnormal:  Lymph Nodes:		[] Abnormal:  Headache:		        [] Abnormal:  Skin:			        [] Abnormal:  Allergy/Immune:       	[] Abnormal:  Psychiatric:		        [] Abnormal:  [x] All other review of systems negative      Recent Ill Contacts:	[] No	[] Yes:  Recent Travel History:	[] No	[] Yes:  Recent Animal/Insect Exposure/Tick Bites:	[] No	[] Yes:    Allergies: No Known Allergies    Home Medications:  atorvastatin 10 mg oral tablet: 1 tab(s) orally once a day (04 Oct 2018 22:38)  furosemide 20 mg oral tablet: 1 tab(s) orally 2 times a day (04 Oct 2018 22:38)  Paxil 20 mg oral tablet: 1 tab(s) orally once a day (04 Oct 2018 22:38)  Xanax 0.5 mg oral tablet: 1 tab(s) orally 2 times a day, As Needed  (04 Oct 2018 22:38)  zolpidem 10 mg oral tablet: 1 tab(s) orally once a day (at bedtime), As Needed (04 Oct 2018 22:38)    Antimicrobials:  vancomycin  IVPB 1000 milliGRAM(s) IV Intermittent every 12 hours    Other Medications:  acetaminophen   Tablet .. 975 milliGRAM(s) Oral every 8 hours  ALPRAZolam 0.5 milliGRAM(s) Oral two times a day PRN  atorvastatin 10 milliGRAM(s) Oral at bedtime  docusate sodium 100 milliGRAM(s) Oral three times a day  famotidine    Tablet 20 milliGRAM(s) Oral every 12 hours  magnesium hydroxide Suspension 30 milliLiter(s) Oral daily PRN  oxyCODONE    IR 2.5 milliGRAM(s) Oral every 4 hours PRN  oxyCODONE    IR 5 milliGRAM(s) Oral every 4 hours PRN  PARoxetine 20 milliGRAM(s) Oral daily  senna 2 Tablet(s) Oral at bedtime  sodium chloride 0.9%. 1000 milliLiter(s) IV Continuous <Continuous>      FAMILY HISTORY: No pertinent family hx in relation to chief complaint    PAST MEDICAL & SURGICAL HISTORY:  Hyperlipemia  Anxiety  H/O total knee replacement: bilateral  History of appendectomy  S/P cataract surgery    SOCIAL HISTORY: Retired, Lives at home with .     Head Circumference:  Vital Signs Last 24 Hrs  T(C): 37.4 (05 Oct 2018 08:00), Max: 39.5 (04 Oct 2018 18:47)  T(F): 99.3 (05 Oct 2018 08:00), Max: 103.1 (04 Oct 2018 18:47)  HR: 81 (05 Oct 2018 09:00) (81 - 118)  BP: 129/63 (05 Oct 2018 09:00) (90/50 - 168/77)  BP(mean): 90 (05 Oct 2018 09:00) (77 - 110)  RR: 27 (05 Oct 2018 09:00) (18 - 27)  SpO2: 100% (05 Oct 2018 09:00) (97% - 100%)    PHYSICAL EXAM    Respiratory Support:		[x] No	[] Yes:  Vasoactive medication infusion:	[x] No	[] Yes:  Venous catheters:		[x] No	[] Yes:  Bladder catheter:		[x] No	[] Yes:  Other catheters or tubes:	[x] No	[] Yes:    PHYSICAL EXAM:  GENERAL: NAD, appears very uncomfortable, restlessness (akathisia-like)  HEAD:  Atraumatic, Normocephalic  EYES: EOMI, PERRLA, conjunctiva and sclera clear  NECK: Supple, No JVD,   CHEST/LUNG: ?basilar crackles bilaterally; No wheeze  HEART: Regular rate and rhythm; No murmurs, rubs, or gallops  ABDOMEN: Soft, Nontender, Nondistended; Bowel sounds present  EXTREMITIES: LLE extremely swollen around knee joint with mild erythema and tenderness to light palpation, especially around prepatella. Small abrasions on prepatella. No exudate. Limited ROM   PSYCH: AAOx3  NEUROLOGY: non-focal  SKIN: No rashes or lesions    Lab Results:                        11.4   7.4   )-----------( 167      ( 05 Oct 2018 02:29 )             33.5     10-05    130<L>  |  98  |  11  ----------------------------<  134<H>  3.2<L>   |  21<L>  |  0.71    Ca    8.3<L>      05 Oct 2018 02:29    TPro  6.1  /  Alb  2.5<L>  /  TBili  0.3  /  DBili  x   /  AST  27  /  ALT  33  /  AlkPhos  120  10-05    LIVER FUNCTIONS - ( 05 Oct 2018 02:29 )  Alb: 2.5 g/dL / Pro: 6.1 g/dL / ALK PHOS: 120 U/L / ALT: 33 U/L / AST: 27 U/L / GGT: x           PT/INR - ( 05 Oct 2018 02:29 )   PT: 15.2 sec;   INR: 1.39 ratio         PTT - ( 05 Oct 2018 02:29 )  PTT:32.1 sec    Blood cx:   Gram Positive Cocci in Clusters seen  Body Fluid knee cx:   Gram Positive Cocci in pairs and Clusters seen      Urinalysis Basic - ( 03 Oct 2018 23:50 )    Color: Yellow / Appearance: Slightly Turbid / S.025 / pH: x  Gluc: x / Ketone: Small  / Bili: Negative / Urobili: Negative   Blood: x / Protein: 100 mg/dL / Nitrite: Negative   Leuk Esterase: Small / RBC: 6 /hpf / WBC 12 /hpf   Sq Epi: x / Non Sq Epi: 7 /hpf / Bacteria: Negative     Xray Tibia + Fibula 2 Views, Left (10.03.18 @ 19:15) >  LEFT KNEE:    The patient is status post total left knee arthroplasty with cemented   components.    Lucency about the cement bone interface of the tibial component likely   reflects osteopenia from stress shielding. Osteolysis could have a   similar appearance in the proper clinical context that this is a clinical   concern then CT or MRI can be performed to further characterize this   finding. No acute periprosthetic fracture is identified.    LEFT TIBIA FIBULA:    No acute fracture or dislocation.

## 2018-10-05 NOTE — CONSULT NOTE ADULT - SUBJECTIVE AND OBJECTIVE BOX
HISTORY OF PRESENT ILLNESS:  CASEY TIJERINA is a 78y Female w/ hx of Sjogren's syndrome, HTN, HLD, BL TKA (2008 with Dr. Dorado, [facility]) and depression presented to ED c/o L knee pain for 4 days. Patient arrived and was seen in ED early AM of 10/04 and knee was aspirated by orthopedics. Growing GPC in both knee aspirate and blood. Patient left AMA initially to follow up at [facility], but came back the same afternoon with fevers again. Denies other recent illnesses. SICU consulted for tachycardia and hypotension concerning for septic shock in the setting of periprosthetic knee infection. Patient received one liter of crystalloid in ED and hypotension responded well from SBP in 90s to . Tachycardia resolved. Pt. febrile to 103. in the ED but resolved with tylenol.       PAST MEDICAL HISTORY: Hyperlipemia  Anxiety      PAST SURGICAL HISTORY: H/O total knee replacement  History of appendectomy  S/P cataract surgery          CODE STATUS: full code     HOME MEDICATIONS:   Xanax 0.5 mg oral tablet: 1 tab(s) orally 2 times a day, As Needed   · 	atorvastatin 10 mg oral tablet: 1 tab(s) orally once a day  · 	furosemide 20 mg oral tablet: 1 tab(s) orally 2 times a day  · 	Paxil 20 mg oral tablet: 1 tab(s) orally once a day  · 	zolpidem 10 mg oral tablet: 1 tab(s) orally once a day (at bedtime), As Needed      ALLERGIES: No Known Allergies        T(C): 37.8 (05 Oct 2018 01:45), Max: 39.5 (04 Oct 2018 18:47)  T(F): 100.1 (05 Oct 2018 01:45), Max: 103.1 (04 Oct 2018 18:47)          NEURO  Exam: awake, alert, oriented x3.   acetaminophen   Tablet .. 975 milliGRAM(s) Oral every 8 hours  ALPRAZolam 0.5 milliGRAM(s) Oral two times a day PRN anxiety  oxyCODONE    IR 2.5 milliGRAM(s) Oral every 4 hours PRN Moderate Pain (4 - 6)  oxyCODONE    IR 5 milliGRAM(s) Oral every 4 hours PRN Severe Pain (7 - 10)  PARoxetine 20 milliGRAM(s) Oral daily      RESPIRATORY  Mechanical Ventilation: none  RR: 24 (05 Oct 2018 01:45) (18 - 24)  SpO2: 100% (04 Oct 2018 23:57) (97% - 100%)  Exam: clear to auscultation bilaterally       CARDIOVASCULAR  VBG - ( 05 Oct 2018 01:24 )  pH: 7.37  /  pCO2: 43    /  pO2: 21    / HCO3: 25    / Base Excess: -0.1  /  SaO2: 27     Lactate: 2.7    HR: 90 (05 Oct 2018 01:45) (85 - 118)  BP: 140/61 (05 Oct 2018 01:45) (90/50 - 159/97)  BP(mean): 90 (05 Oct 2018 01:45) (90 - 90)  Exam: normal rate and rhythm   Cardiac Rhythm: normal sinus rhythm       GI/NUTRITION  Exam: soft, non-tender, non-distended  Diet: NPO except meds  docusate sodium 100 milliGRAM(s) Oral three times a day  famotidine    Tablet 20 milliGRAM(s) Oral every 12 hours  magnesium hydroxide Suspension 30 milliLiter(s) Oral daily PRN Constipation  senna 2 Tablet(s) Oral at bedtime      GENITOURINARY/RENAL  sodium chloride 0.9%. 1000 milliLiter(s) IV Continuous <Continuous>      10-04 @ 07:01  -  10-05 @ 02:43  --------------------------------------------------------  IN:    sodium chloride 0.9%.: 125 mL  Total IN: 125 mL    OUT:  Total OUT: 0 mL    Total NET: 125 mL        Weight (kg): 75.7 (10-05 @ 01:45)  10-04    129<L>  |  95<L>  |  16  ----------------------------<  211<H>  3.0<L>   |  22  |  0.97    Ca    8.9      04 Oct 2018 15:45    TPro  6.5  /  Alb  2.9<L>  /  TBili  0.4  /  DBili  x   /  AST  32  /  ALT  37  /  AlkPhos  142<H>  10-04    [ ] Diaz catheter, indication: n/a    HEMATOLOGIC  [ ] VTE Prophylaxis:                          12.2   7.1   )-----------( 183      ( 04 Oct 2018 15:45 )             37.2     PT/INR - ( 04 Oct 2018 15:45 )   PT: 15.8 sec;   INR: 1.44 ratio         PTT - ( 04 Oct 2018 15:45 )  PTT:32.3 sec  Transfusion: [ ] PRBC	[ ] Platelets	[ ] FFP	[ ] Cryoprecipitate      INFECTIOUS DISEASES  vancomycin  IVPB 1000 milliGRAM(s) IV Intermittent every 12 hours    RECENT CULTURES:  Specimen Source: .Body Fluid Knee Fluid  Date/Time: 10-04 @ 05:44  Culture Results: --  Gram Stain:   polymorphonuclear leukocytes seen  Gram Positive Cocci in Clusters seen  Gram positive cocci in pairs seen  by cytocentrifuge  Organism: --  Specimen Source: .Blood Blood-Peripheral  Date/Time: 10-03 @ 21:52  Culture Results:   Growth in aerobic bottle: Gram Positive Cocci in Clusters  Growth in anaerobic bottle: Gram Positive Cocci in Clusters  Gram Stain:   Growth in aerobic bottle: Gram Positive Cocci in Clusters  Growth in anaerobic bottle: Gram Positive Cocci in Clusters  Organism: Blood Culture PCR      ENDOCRINE  atorvastatin 10 milliGRAM(s) Oral at bedtime    CAPILLARY BLOOD GLUCOSE          PATIENT CARE ACCESS DEVICES:  [x] Peripheral IV  [ ] Central Venous Line	[ ] R	[ ] L	[ ] IJ	[ ] Fem	[ ] SC	Placed:   [ ] Arterial Line		[ ] R	[ ] L	[ ] Fem	[ ] Rad	[ ] Ax	Placed:   [ ] PICC:					[ ] Mediport  [ ] Urinary Catheter, Date Placed:   [x] Necessity of urinary, arterial, and venous catheters discussed    OTHER MEDICATIONS: none    IMAGING STUDIES: < from: CT 3D Reconstruct w/ Workstation (10.04.18 @ 00:59) >  indings:     Status post right knee total replacement. There is moderate amount of   fluid collection in the suprapatellar bursa. The fluid extends 5 cm   craniocaudally from the superior margin of the prosthesis. There is no   periprostaticloosening or hardware complication.    There is no fracture or malalignment.    Impression:    Moderate fluid collection in the suprapatellar bursa. Recommend   aspiration for fluid analysis.      < end of copied text >

## 2018-10-05 NOTE — PROVIDER CONTACT NOTE (CHANGE IN STATUS NOTIFICATION) - SITUATION
ANDRES Jackson made aware that pt has been hypertensive with  SBP in high 170s for 1400 and 1500. Patient resting comfortably in bed.

## 2018-10-05 NOTE — CONSULT NOTE ADULT - SUBJECTIVE AND OBJECTIVE BOX
CHIEF COMPLAINT:Patient is a 78y old  Female who presents with a chief complaint of L TKA acute PJI (05 Oct 2018 06:34)      HISTORY OF PRESENT ILLNESS:    78 female with history as below s/p b/l TKA  and Sjogren's syndrome admitted with left knee pain now with septic joint and bactermia   initially pt hypotension, tachycardia but responded to IVF   denies any chest pain, sob, palpitation, dizziness or syncope.     PAST MEDICAL & SURGICAL HISTORY:  Hyperlipemia  Anxiety  H/O total knee replacement: bilateral  History of appendectomy  S/P cataract surgery          MEDICATIONS:    vancomycin  IVPB 1000 milliGRAM(s) IV Intermittent every 12 hours      acetaminophen   Tablet .. 975 milliGRAM(s) Oral every 8 hours  ALPRAZolam 0.5 milliGRAM(s) Oral two times a day PRN  oxyCODONE    IR 2.5 milliGRAM(s) Oral every 4 hours PRN  oxyCODONE    IR 5 milliGRAM(s) Oral every 4 hours PRN  PARoxetine 20 milliGRAM(s) Oral daily    docusate sodium 100 milliGRAM(s) Oral three times a day  famotidine    Tablet 20 milliGRAM(s) Oral every 12 hours  magnesium hydroxide Suspension 30 milliLiter(s) Oral daily PRN  senna 2 Tablet(s) Oral at bedtime    atorvastatin 10 milliGRAM(s) Oral at bedtime    potassium chloride  10 mEq/100 mL IVPB 10 milliEquivalent(s) IV Intermittent every 1 hour  sodium chloride 0.9%. 1000 milliLiter(s) IV Continuous <Continuous>      FAMILY HISTORY:      Non-contributory    SOCIAL HISTORY:    No tobacco, drugs or etoh    Allergies    No Known Allergies    Intolerances    	    REVIEW OF SYSTEMS:  as above  The rest of the 14 points ROS reviewed and except above they are unremarkable.        PHYSICAL EXAM:  T(C): 37.2 (10-05-18 @ 03:00), Max: 39.5 (10-04-18 @ 18:47)  HR: 87 (10-05-18 @ 06:00) (82 - 118)  BP: 167/74 (10-05-18 @ 06:00) (90/50 - 168/77)  RR: 25 (10-05-18 @ 06:00) (18 - 25)  SpO2: 100% (10-05-18 @ 06:00) (97% - 100%)  Wt(kg): --  I&O's Summary    04 Oct 2018 07:01  -  05 Oct 2018 06:45  --------------------------------------------------------  IN: 1075 mL / OUT: 250 mL / NET: 825 mL        Appearance: Normal	  HEENT:   no gross abnormality   Cardiovascular: Normal S1 S2,    Murmur:   Neck: JVP normal  Respiratory: Lungs clear   Gastrointestinal:  Soft, Non-tender  Skin: normal   Neuro: No gross deficits.   Psychiatry:  Mood & affect flat  Ext: No edema    LABS/DATA:    TELEMETRY: 	    ECG:  	   	  CARDIAC MARKERS:                                      11.4   7.4   )-----------( 167      ( 05 Oct 2018 02:29 )             33.5     10-05    130<L>  |  98  |  11  ----------------------------<  134<H>  3.2<L>   |  21<L>  |  0.71    Ca    8.3<L>      05 Oct 2018 02:29    TPro  6.1  /  Alb  2.5<L>  /  TBili  0.3  /  DBili  x   /  AST  27  /  ALT  33  /  AlkPhos  120  10-05    proBNP:   Lipid Profile:   HgA1c:   TSH:

## 2018-10-05 NOTE — CONSULT NOTE ADULT - ASSESSMENT
hypotension resolved  likely due to sirs from bacteremia     septic joint  planned for wash out   anbx  consider ID eval  follow up culture   obtain echo   PEACE to rule out IE    HTN  had episode of hypotension  now resolved  cont to monitor and treat as needed    PreOp  Based on current ACC/AHA guidelines, patient history and physical exam, the patient is considered to have elevated risk  no absolute contra indication to proceed to OR hypotension resolved  likely due to sirs from bacteremia     septic joint  planned for wash out   anbx  consider ID eval  follow up culture   obtain echo   PEACE to rule out IE    HTN  had episode of hypotension  now resolved  cont to monitor and treat as needed    PreOp  Based on current ACC/AHA guidelines, patient history and physical exam, the patient is considered to have elevated risk  no absolute contra indication to proceed to OR   obtain ecg

## 2018-10-05 NOTE — PROGRESS NOTE ADULT - SUBJECTIVE AND OBJECTIVE BOX
77 yo female PMhx anxiety and b/l total knee replacements (left knee done ) c/o fever and L knee pain. Patient seen at [facility] ED last night for left knee pain and fever's, was diagnose with infected L knee joint after arthrocentesis and was advised to stay for admission but left AMA after advising ED staff she would go to [facility]. Went home in AM and continued to having fevers at home with increasing pain in L knee so came back. Fever tmax ~104 at home rectally. No dizziness, no chest pain, no n/v, no weakness, no numbness/tingling. Patient seen now resting comfortably.  Had  washout of left knee. Patient had a hypotensive episode that responded to bolus of fluid.           PAST MEDICAL & SURGICAL HISTORY:  Hyperlipemia  Anxiety  H/O total knee replacement: bilateral  History of appendectomy  S/P cataract surgery        MEDICATIONS  (STANDING):  acetaminophen   Tablet .. 975 milliGRAM(s) Oral every 8 hours  atorvastatin 10 milliGRAM(s) Oral at bedtime  ceFAZolin   IVPB 2000 milliGRAM(s) IV Intermittent every 8 hours  ceFAZolin   IVPB      docusate sodium 100 milliGRAM(s) Oral three times a day  famotidine    Tablet 20 milliGRAM(s) Oral every 12 hours  furosemide    Tablet 20 milliGRAM(s) Oral daily  PARoxetine 20 milliGRAM(s) Oral daily  senna 2 Tablet(s) Oral at bedtime  sodium chloride 0.9%. 1000 milliLiter(s) (125 mL/Hr) IV Continuous <Continuous>  vancomycin  IVPB 1000 milliGRAM(s) IV Intermittent every 12 hours    MEDICATIONS  (PRN):  ALPRAZolam 0.5 milliGRAM(s) Oral two times a day PRN anxiety  magnesium hydroxide Suspension 30 milliLiter(s) Oral daily PRN Constipation  oxyCODONE    IR 2.5 milliGRAM(s) Oral every 4 hours PRN Moderate Pain (4 - 6)  oxyCODONE    IR 5 milliGRAM(s) Oral every 4 hours PRN Severe Pain (7 - 10)  zolpidem 5 milliGRAM(s) Oral at bedtime PRN Insomnia  zolpidem 5 milliGRAM(s) Oral at bedtime PRN Insomnia      Soc Hx:  Tobacco: ne  ETOH: occasionally  Drugs: Neg    Family Hx  CAD   sepsis      CONSTITUTIONAL: No weakness, fevers or chills  EYES/ENT: No visual changes;  No vertigo or throat pain   NECK: No pain or stiffness  RESPIRATORY: No cough, wheezing, hemoptysis; No shortness of breath  CARDIOVASCULAR: No chest pain or palpitations  GASTROINTESTINAL: No abdominal or epigastric pain. No nausea, vomiting, or hematemesis; No diarrhea or constipation. No melena or hematochezia.  GENITOURINARY: No dysuria, frequency or hematuria  NEUROLOGICAL: No numbness or weakness  SKIN: No itching, burning, rashes, or lesions   MUSCULOSKELETAL: left leg pain    INTERVAL HPI/OVERNIGHT EVENTS:  T(C): 36.6 (10-04-18 @ 23:57), Max: 39.5 (10-04-18 @ 18:47)  HR: 90 (10-04-18 @ 23:57) (85 - 118)  BP: 159/97 (10-04-18 @ 23:57) (90/50 - 159/97)  RR: 18 (10-04-18 @ 23:57) (18 - 20)  SpO2: 100% (10-04-18 @ 23:57) (97% - 100%)  Wt(kg): --  I&O's Summary      PHYSICAL EXAM:  GENERAL: NAD, well-groomed, well-developed  HEAD:  Atraumatic, Normocephalic  EYES: EOMI, PERRLA, conjunctiva and sclera clear  ENMT: No tonsillar erythema, exudates, or enlargement; Moist mucous membranes, Good dentition, No lesions  NECK: Supple, No JVD, Normal thyroid  NERVOUS SYSTEM:  Alert & Oriented X3, Good concentration; Motor Strength 5/5 B/L upper and lower extremities; DTRs 2+ intact and symmetric  CHEST/LUNG: Clear to percussion bilaterally; No rales, rhonchi, wheezing, or rubs  HEART: Regular rate and rhythm; No murmurs, rubs, or gallops  ABDOMEN: Soft, Nontender, Nondistended; Bowel sounds present  EXTREMITIES: Bruising and swelling of the left LE  LYMPH: No lymphadenopathy noted  SKIN: No rashes or lesions        LABS:                        12.2   7.1   )-----------( 183      ( 04 Oct 2018 15:45 )             37.2     10-04    129<L>  |  95<L>  |  16  ----------------------------<  211<H>  3.0<L>   |  22  |  0.97    Ca    8.9      04 Oct 2018 15:45    TPro  6.5  /  Alb  2.9<L>  /  TBili  0.4  /  DBili  x   /  AST  32  /  ALT  37  /  AlkPhos  142<H>  10-04    PT/INR - ( 04 Oct 2018 15:45 )   PT: 15.8 sec;   INR: 1.44 ratio         PTT - ( 04 Oct 2018 15:45 )  PTT:32.3 sec  Urinalysis Basic - ( 03 Oct 2018 23:50 )    Color: Yellow / Appearance: Slightly Turbid / S.025 / pH: x  Gluc: x / Ketone: Small  / Bili: Negative / Urobili: Negative   Blood: x / Protein: 100 mg/dL / Nitrite: Negative   Leuk Esterase: Small / RBC: 6 /hpf / WBC 12 /hpf   Sq Epi: x / Non Sq Epi: 7 /hpf / Bacteria: Negative      CAPILLARY BLOOD GLUCOSE      EKG:  NSR @ 100 with a RBBB      Urinalysis Basic - ( 03 Oct 2018 23:50 )    Color: Yellow / Appearance: Slightly Turbid / S.025 / pH: x  Gluc: x / Ketone: Small  / Bili: Negative / Urobili: Negative   Blood: x / Protein: 100 mg/dL / Nitrite: Negative   Leuk Esterase: Small / RBC: 6 /hpf / WBC 12 /hpf   Sq Epi: x / Non Sq Epi: 7 /hpf / Bacteria: Negative        Radiology reports:

## 2018-10-05 NOTE — PROGRESS NOTE ADULT - ATTENDING COMMENTS
I agree with the above note and have personally seen and examined this patient. All pertinent films have been reviewed. Please refer to clinical documentation of the history, physical examinations, data summary, and both assessment and plan as documented above and with which I agree.    transferred to ICU overnight at my request given fevers, bacteremia and early signs of sepsis  stable in ICU, mentating well  pain controlled    febrile, vss  nad  lle  2+ effusion, warm  ecchymosis over leg with erythema  firing ta/ehl/gcs  silt l4-s1  2+dp    plan to OR likely today for I&D and PE exchange versus explantation, I&D and antibiotic spacer placement  pending ID consult  SICU to improve electrolyte imbalance  consent in chart  chet placed on RLE for OR  hold dvt ppx    Bunny Montoya MD  Attending Orthopedic Surgeon

## 2018-10-05 NOTE — CONSULT NOTE ADULT - ATTENDING COMMENTS
Dr. Austin (Attending Physician)  Staph Septic Arthritis with Staph Bacteremia and Sepsis currently on vancomycin, will dc cefazolin.  Patient is Delirious tonight c/w sepsis takes prn benzos at home but is not tremulous, nauseated, very low suspicion for benzo withdrawal. Lactate was elevated to 2.7 already given bolus with improvement in HR and BP.  Hyponatremia appears euvolemic at this time on NS at 125 mL/hr. Hypokalemia repleting    This patient was critically ill with one or more vital organ systems at a high probability of imminent or life threatening deterioration. Complex decision making was required to assess and treat single or multiple vital organ system failure and/or prevent further life threatening deterioration of the patient’s condition.  All recent labwork and imaging was reviewed.  Total Critical Care Time 35min
78F with recent fall with sepsis, MSSA bacteremia, left TKR PJI, fever, back pain  -new problems  -ancef 2 gm iv q8  -check repeat bcx  -DC vanco   -check TTE to r/o IE  -back pain+ - will need imaging to r/o discitis/paraspinal infection   -care per SICU  -for OR today per ortho - will discuss OR findings postop  -plan long term iv abx with PICC eventually     Umberto Hendrix  Attending Physician   Division of Infectious Disease  Pager #[phone]  After 5pm/weekend or no response, call #[phone]    Please call the ID service [phone] with questions or concerns over the weekend.

## 2018-10-06 DIAGNOSIS — R50.81 FEVER PRESENTING WITH CONDITIONS CLASSIFIED ELSEWHERE: ICD-10-CM

## 2018-10-06 DIAGNOSIS — M54.9 DORSALGIA, UNSPECIFIED: ICD-10-CM

## 2018-10-06 DIAGNOSIS — A41.01 SEPSIS DUE TO METHICILLIN SUSCEPTIBLE STAPHYLOCOCCUS AUREUS: ICD-10-CM

## 2018-10-06 DIAGNOSIS — Z79.2 LONG TERM (CURRENT) USE OF ANTIBIOTICS: ICD-10-CM

## 2018-10-06 DIAGNOSIS — R78.81 BACTEREMIA: ICD-10-CM

## 2018-10-06 DIAGNOSIS — T84.50XA INFECTION AND INFLAMMATORY REACTION DUE TO UNSPECIFIED INTERNAL JOINT PROSTHESIS, INITIAL ENCOUNTER: ICD-10-CM

## 2018-10-06 LAB
-  AMPICILLIN/SULBACTAM: SIGNIFICANT CHANGE UP
-  AMPICILLIN/SULBACTAM: SIGNIFICANT CHANGE UP
-  CEFAZOLIN: SIGNIFICANT CHANGE UP
-  CEFAZOLIN: SIGNIFICANT CHANGE UP
-  CLINDAMYCIN: SIGNIFICANT CHANGE UP
-  CLINDAMYCIN: SIGNIFICANT CHANGE UP
-  ERYTHROMYCIN: SIGNIFICANT CHANGE UP
-  ERYTHROMYCIN: SIGNIFICANT CHANGE UP
-  GENTAMICIN: SIGNIFICANT CHANGE UP
-  GENTAMICIN: SIGNIFICANT CHANGE UP
-  OXACILLIN: SIGNIFICANT CHANGE UP
-  OXACILLIN: SIGNIFICANT CHANGE UP
-  PENICILLIN: SIGNIFICANT CHANGE UP
-  PENICILLIN: SIGNIFICANT CHANGE UP
-  RIFAMPIN: SIGNIFICANT CHANGE UP
-  RIFAMPIN: SIGNIFICANT CHANGE UP
-  TETRACYCLINE: SIGNIFICANT CHANGE UP
-  TETRACYCLINE: SIGNIFICANT CHANGE UP
-  TRIMETHOPRIM/SULFAMETHOXAZOLE: SIGNIFICANT CHANGE UP
-  TRIMETHOPRIM/SULFAMETHOXAZOLE: SIGNIFICANT CHANGE UP
-  VANCOMYCIN: SIGNIFICANT CHANGE UP
-  VANCOMYCIN: SIGNIFICANT CHANGE UP
ANION GAP SERPL CALC-SCNC: 11 MMOL/L — SIGNIFICANT CHANGE UP (ref 5–17)
APTT BLD: 27.2 SEC — LOW (ref 27.5–37.4)
BUN SERPL-MCNC: 9 MG/DL — SIGNIFICANT CHANGE UP (ref 7–23)
CALCIUM SERPL-MCNC: 8.4 MG/DL — SIGNIFICANT CHANGE UP (ref 8.4–10.5)
CHLORIDE SERPL-SCNC: 104 MMOL/L — SIGNIFICANT CHANGE UP (ref 96–108)
CO2 SERPL-SCNC: 19 MMOL/L — LOW (ref 22–31)
CREAT SERPL-MCNC: 0.6 MG/DL — SIGNIFICANT CHANGE UP (ref 0.5–1.3)
CULTURE RESULTS: SIGNIFICANT CHANGE UP
GLUCOSE SERPL-MCNC: 128 MG/DL — HIGH (ref 70–99)
GRAM STN FLD: SIGNIFICANT CHANGE UP
HCT VFR BLD CALC: 34.3 % — LOW (ref 34.5–45)
HGB BLD-MCNC: 11.2 G/DL — LOW (ref 11.5–15.5)
INR BLD: 1.32 RATIO — HIGH (ref 0.88–1.16)
MAGNESIUM SERPL-MCNC: 1.8 MG/DL — SIGNIFICANT CHANGE UP (ref 1.6–2.6)
MCHC RBC-ENTMCNC: 31.4 PG — SIGNIFICANT CHANGE UP (ref 27–34)
MCHC RBC-ENTMCNC: 32.7 GM/DL — SIGNIFICANT CHANGE UP (ref 32–36)
MCV RBC AUTO: 96 FL — SIGNIFICANT CHANGE UP (ref 80–100)
METHOD TYPE: SIGNIFICANT CHANGE UP
METHOD TYPE: SIGNIFICANT CHANGE UP
NIGHT BLUE STAIN TISS: SIGNIFICANT CHANGE UP
ORGANISM # SPEC MICROSCOPIC CNT: SIGNIFICANT CHANGE UP
PHOSPHATE SERPL-MCNC: 2.5 MG/DL — SIGNIFICANT CHANGE UP (ref 2.5–4.5)
PLATELET # BLD AUTO: 153 K/UL — SIGNIFICANT CHANGE UP (ref 150–400)
POTASSIUM SERPL-MCNC: 4 MMOL/L — SIGNIFICANT CHANGE UP (ref 3.5–5.3)
POTASSIUM SERPL-SCNC: 4 MMOL/L — SIGNIFICANT CHANGE UP (ref 3.5–5.3)
PROTHROM AB SERPL-ACNC: 14.3 SEC — HIGH (ref 9.8–12.7)
RBC # BLD: 3.57 M/UL — LOW (ref 3.8–5.2)
RBC # FLD: 12.4 % — SIGNIFICANT CHANGE UP (ref 10.3–14.5)
SODIUM SERPL-SCNC: 134 MMOL/L — LOW (ref 135–145)
SPECIMEN SOURCE: SIGNIFICANT CHANGE UP
WBC # BLD: 7.4 K/UL — SIGNIFICANT CHANGE UP (ref 3.8–10.5)
WBC # FLD AUTO: 7.4 K/UL — SIGNIFICANT CHANGE UP (ref 3.8–10.5)

## 2018-10-06 PROCEDURE — 99233 SBSQ HOSP IP/OBS HIGH 50: CPT

## 2018-10-06 PROCEDURE — 73560 X-RAY EXAM OF KNEE 1 OR 2: CPT | Mod: 26,LT

## 2018-10-06 PROCEDURE — 71045 X-RAY EXAM CHEST 1 VIEW: CPT | Mod: 26

## 2018-10-06 PROCEDURE — 99232 SBSQ HOSP IP/OBS MODERATE 35: CPT

## 2018-10-06 RX ORDER — FUROSEMIDE 40 MG
20 TABLET ORAL
Qty: 0 | Refills: 0 | Status: DISCONTINUED | OUTPATIENT
Start: 2018-10-06 | End: 2018-10-10

## 2018-10-06 RX ORDER — OXYCODONE HYDROCHLORIDE 5 MG/1
5 TABLET ORAL EVERY 4 HOURS
Qty: 0 | Refills: 0 | Status: DISCONTINUED | OUTPATIENT
Start: 2018-10-06 | End: 2018-10-06

## 2018-10-06 RX ORDER — MORPHINE SULFATE 50 MG/1
2 CAPSULE, EXTENDED RELEASE ORAL ONCE
Qty: 0 | Refills: 0 | Status: DISCONTINUED | OUTPATIENT
Start: 2018-10-06 | End: 2018-10-06

## 2018-10-06 RX ORDER — CEFAZOLIN SODIUM 1 G
2000 VIAL (EA) INJECTION EVERY 8 HOURS
Qty: 0 | Refills: 0 | Status: DISCONTINUED | OUTPATIENT
Start: 2018-10-06 | End: 2018-10-10

## 2018-10-06 RX ORDER — ATORVASTATIN CALCIUM 80 MG/1
10 TABLET, FILM COATED ORAL AT BEDTIME
Qty: 0 | Refills: 0 | Status: DISCONTINUED | OUTPATIENT
Start: 2018-10-06 | End: 2018-10-10

## 2018-10-06 RX ORDER — FAMOTIDINE 10 MG/ML
20 INJECTION INTRAVENOUS EVERY 12 HOURS
Qty: 0 | Refills: 0 | Status: DISCONTINUED | OUTPATIENT
Start: 2018-10-06 | End: 2018-10-10

## 2018-10-06 RX ORDER — OXYCODONE HYDROCHLORIDE 5 MG/1
5 TABLET ORAL EVERY 4 HOURS
Qty: 0 | Refills: 0 | Status: DISCONTINUED | OUTPATIENT
Start: 2018-10-06 | End: 2018-10-10

## 2018-10-06 RX ORDER — ATORVASTATIN CALCIUM 80 MG/1
10 TABLET, FILM COATED ORAL AT BEDTIME
Qty: 0 | Refills: 0 | Status: DISCONTINUED | OUTPATIENT
Start: 2018-10-06 | End: 2018-10-06

## 2018-10-06 RX ORDER — MAGNESIUM HYDROXIDE 400 MG/1
30 TABLET, CHEWABLE ORAL DAILY
Qty: 0 | Refills: 0 | Status: DISCONTINUED | OUTPATIENT
Start: 2018-10-06 | End: 2018-10-10

## 2018-10-06 RX ORDER — OXYCODONE HYDROCHLORIDE 5 MG/1
10 TABLET ORAL EVERY 4 HOURS
Qty: 0 | Refills: 0 | Status: DISCONTINUED | OUTPATIENT
Start: 2018-10-06 | End: 2018-10-10

## 2018-10-06 RX ORDER — ASPIRIN/CALCIUM CARB/MAGNESIUM 324 MG
325 TABLET ORAL
Qty: 0 | Refills: 0 | Status: DISCONTINUED | OUTPATIENT
Start: 2018-10-06 | End: 2018-10-10

## 2018-10-06 RX ORDER — SODIUM CHLORIDE 9 MG/ML
1000 INJECTION, SOLUTION INTRAVENOUS
Qty: 0 | Refills: 0 | Status: DISCONTINUED | OUTPATIENT
Start: 2018-10-06 | End: 2018-10-06

## 2018-10-06 RX ORDER — MAGNESIUM SULFATE 500 MG/ML
2 VIAL (ML) INJECTION ONCE
Qty: 0 | Refills: 0 | Status: COMPLETED | OUTPATIENT
Start: 2018-10-06 | End: 2018-10-06

## 2018-10-06 RX ORDER — SENNA PLUS 8.6 MG/1
2 TABLET ORAL AT BEDTIME
Qty: 0 | Refills: 0 | Status: DISCONTINUED | OUTPATIENT
Start: 2018-10-06 | End: 2018-10-10

## 2018-10-06 RX ORDER — VANCOMYCIN HCL 1 G
1000 VIAL (EA) INTRAVENOUS EVERY 12 HOURS
Qty: 0 | Refills: 0 | Status: DISCONTINUED | OUTPATIENT
Start: 2018-10-06 | End: 2018-10-06

## 2018-10-06 RX ORDER — DOCUSATE SODIUM 100 MG
100 CAPSULE ORAL THREE TIMES A DAY
Qty: 0 | Refills: 0 | Status: DISCONTINUED | OUTPATIENT
Start: 2018-10-06 | End: 2018-10-10

## 2018-10-06 RX ORDER — ACETAMINOPHEN 500 MG
975 TABLET ORAL EVERY 8 HOURS
Qty: 0 | Refills: 0 | Status: DISCONTINUED | OUTPATIENT
Start: 2018-10-06 | End: 2018-10-10

## 2018-10-06 RX ORDER — KETOROLAC TROMETHAMINE 30 MG/ML
15 SYRINGE (ML) INJECTION EVERY 8 HOURS
Qty: 0 | Refills: 0 | Status: DISCONTINUED | OUTPATIENT
Start: 2018-10-06 | End: 2018-10-10

## 2018-10-06 RX ORDER — OXYCODONE HYDROCHLORIDE 5 MG/1
2.5 TABLET ORAL EVERY 4 HOURS
Qty: 0 | Refills: 0 | Status: DISCONTINUED | OUTPATIENT
Start: 2018-10-06 | End: 2018-10-06

## 2018-10-06 RX ORDER — ALPRAZOLAM 0.25 MG
0.5 TABLET ORAL
Qty: 0 | Refills: 0 | Status: DISCONTINUED | OUTPATIENT
Start: 2018-10-06 | End: 2018-10-10

## 2018-10-06 RX ADMIN — Medication 100 MILLIGRAM(S): at 14:02

## 2018-10-06 RX ADMIN — Medication 100 MILLIGRAM(S): at 05:05

## 2018-10-06 RX ADMIN — Medication 975 MILLIGRAM(S): at 22:24

## 2018-10-06 RX ADMIN — Medication 100 MILLIGRAM(S): at 22:22

## 2018-10-06 RX ADMIN — OXYCODONE HYDROCHLORIDE 5 MILLIGRAM(S): 5 TABLET ORAL at 02:40

## 2018-10-06 RX ADMIN — Medication 975 MILLIGRAM(S): at 05:00

## 2018-10-06 RX ADMIN — Medication 975 MILLIGRAM(S): at 15:00

## 2018-10-06 RX ADMIN — Medication 100 MILLIGRAM(S): at 05:01

## 2018-10-06 RX ADMIN — FAMOTIDINE 20 MILLIGRAM(S): 10 INJECTION INTRAVENOUS at 05:00

## 2018-10-06 RX ADMIN — Medication 0.5 MILLIGRAM(S): at 03:15

## 2018-10-06 RX ADMIN — Medication 20 MILLIGRAM(S): at 12:24

## 2018-10-06 RX ADMIN — Medication 0.5 MILLIGRAM(S): at 22:28

## 2018-10-06 RX ADMIN — Medication 50 GRAM(S): at 04:56

## 2018-10-06 RX ADMIN — Medication 975 MILLIGRAM(S): at 22:40

## 2018-10-06 RX ADMIN — Medication 62.5 MILLIMOLE(S): at 06:15

## 2018-10-06 RX ADMIN — Medication 15 MILLIGRAM(S): at 15:54

## 2018-10-06 RX ADMIN — Medication 325 MILLIGRAM(S): at 17:14

## 2018-10-06 RX ADMIN — OXYCODONE HYDROCHLORIDE 5 MILLIGRAM(S): 5 TABLET ORAL at 10:35

## 2018-10-06 RX ADMIN — SODIUM CHLORIDE 50 MILLILITER(S): 9 INJECTION, SOLUTION INTRAVENOUS at 02:40

## 2018-10-06 RX ADMIN — OXYCODONE HYDROCHLORIDE 5 MILLIGRAM(S): 5 TABLET ORAL at 11:00

## 2018-10-06 RX ADMIN — Medication 250 MILLIGRAM(S): at 05:49

## 2018-10-06 RX ADMIN — Medication 15 MILLIGRAM(S): at 16:30

## 2018-10-06 RX ADMIN — SENNA PLUS 2 TABLET(S): 8.6 TABLET ORAL at 22:22

## 2018-10-06 RX ADMIN — Medication 15 MILLIGRAM(S): at 22:45

## 2018-10-06 RX ADMIN — OXYCODONE HYDROCHLORIDE 5 MILLIGRAM(S): 5 TABLET ORAL at 03:10

## 2018-10-06 RX ADMIN — Medication 15 MILLIGRAM(S): at 22:31

## 2018-10-06 RX ADMIN — MORPHINE SULFATE 2 MILLIGRAM(S): 50 CAPSULE, EXTENDED RELEASE ORAL at 12:44

## 2018-10-06 RX ADMIN — ATORVASTATIN CALCIUM 10 MILLIGRAM(S): 80 TABLET, FILM COATED ORAL at 22:22

## 2018-10-06 RX ADMIN — Medication 975 MILLIGRAM(S): at 05:30

## 2018-10-06 RX ADMIN — Medication 975 MILLIGRAM(S): at 14:01

## 2018-10-06 RX ADMIN — FAMOTIDINE 20 MILLIGRAM(S): 10 INJECTION INTRAVENOUS at 17:14

## 2018-10-06 RX ADMIN — Medication 20 MILLIGRAM(S): at 17:15

## 2018-10-06 RX ADMIN — Medication 325 MILLIGRAM(S): at 05:00

## 2018-10-06 RX ADMIN — MORPHINE SULFATE 2 MILLIGRAM(S): 50 CAPSULE, EXTENDED RELEASE ORAL at 13:00

## 2018-10-06 NOTE — PROGRESS NOTE ADULT - SUBJECTIVE AND OBJECTIVE BOX
CASEY TIJERINA 78y [id number]    Patient is a 78y old  Female who presents with a chief complaint of L TKA acute PJI (06 Oct 2018 01:40)      Follow Up/CC:  ID following for bacteremia    Interval History/ROS: awake, s/p I+D left knee with polyexchange    Allergies    No Known Allergies    Intolerances        ANTIMICROBIALS:  ceFAZolin   IVPB 2000 every 8 hours  vancomycin  IVPB 1000 every 12 hours      MEDICATIONS  (STANDING):  acetaminophen   Tablet .. 975 milliGRAM(s) Oral every 8 hours  aspirin 325 milliGRAM(s) Oral two times a day  atorvastatin 10 milliGRAM(s) Oral at bedtime  ceFAZolin   IVPB 2000 milliGRAM(s) IV Intermittent every 8 hours  docusate sodium 100 milliGRAM(s) Oral three times a day  famotidine    Tablet 20 milliGRAM(s) Oral every 12 hours  lactated ringers. 1000 milliLiter(s) (50 mL/Hr) IV Continuous <Continuous>  morphine  - Injectable 2 milliGRAM(s) IV Push once  PARoxetine 20 milliGRAM(s) Oral daily  senna 2 Tablet(s) Oral at bedtime  vancomycin  IVPB 1000 milliGRAM(s) IV Intermittent every 12 hours    MEDICATIONS  (PRN):  ALPRAZolam 0.5 milliGRAM(s) Oral two times a day PRN anxiety  magnesium hydroxide Suspension 30 milliLiter(s) Oral daily PRN Constipation  oxyCODONE    IR 2.5 milliGRAM(s) Oral every 4 hours PRN Moderate Pain (4 - 6)  oxyCODONE    IR 5 milliGRAM(s) Oral every 4 hours PRN Severe Pain (7 - 10)        Vital Signs Last 24 Hrs  T(C): 36.7 (06 Oct 2018 03:00), Max: 37.4 (05 Oct 2018 08:00)  T(F): 98 (06 Oct 2018 03:00), Max: 99.3 (05 Oct 2018 08:00)  HR: 85 (06 Oct 2018 06:00) (73 - 99)  BP: 147/66 (06 Oct 2018 06:00) (115/53 - 182/79)  BP(mean): 95 (06 Oct 2018 06:00) (77 - 116)  RR: 14 (06 Oct 2018 06:00) (13 - 35)  SpO2: 97% (06 Oct 2018 06:00) (65% - 100%)    CBC Full  -  ( 06 Oct 2018 02:46 )  WBC Count : 7.4 K/uL  Hemoglobin : 11.2 g/dL  Hematocrit : 34.3 %  Platelet Count - Automated : 153 K/uL  Mean Cell Volume : 96.0 fl  Mean Cell Hemoglobin : 31.4 pg  Mean Cell Hemoglobin Concentration : 32.7 gm/dL  Auto Neutrophil # : x  Auto Lymphocyte # : x  Auto Monocyte # : x  Auto Eosinophil # : x  Auto Basophil # : x  Auto Neutrophil % : x  Auto Lymphocyte % : x  Auto Monocyte % : x  Auto Eosinophil % : x  Auto Basophil % : x    10-06    134<L>  |  104  |  9   ----------------------------<  128<H>  4.0   |  19<L>  |  0.60    Ca    8.4      06 Oct 2018 02:46  Phos  2.5     10-06  Mg     1.8     10-06    TPro  6.1  /  Alb  2.5<L>  /  TBili  0.3  /  DBili  x   /  AST  27  /  ALT  33  /  AlkPhos  120  10-05    LIVER FUNCTIONS - ( 05 Oct 2018 02:29 )  Alb: 2.5 g/dL / Pro: 6.1 g/dL / ALK PHOS: 120 U/L / ALT: 33 U/L / AST: 27 U/L / GGT: x               MICROBIOLOGY:  .Blood Blood-Peripheral  10-04-18   Growth in aerobic bottle: Gram Positive Cocci in Clusters  Growth in anaerobic bottle: Gram Positive Cocci in Clusters  --    Growth in aerobic bottle: Gram Positive Cocci in Clusters  Growth in anaerobic bottle: Gram Positive Cocci in Clusters      .Urine Clean Catch (Midstream)  10-04-18   No growth  --  --      .Body Fluid Knee Fluid  10-04-18   Moderate Staphylococcus aureus  --    polymorphonuclear leukocytes seen  Gram Positive Cocci in Clusters seen  Gram positive cocci in pairs seen  by cytocentrifuge      .Blood Blood-Peripheral  10-03-18   Growth in aerobic and anaerobic bottles: Staphylococcus aureus  See previous culture [id number]  --  Blood Culture PCR      RADIOLOGY    < from: Xray Chest 1 View- PORTABLE-Urgent (10.05.18 @ 08:32) >  Clear lungs      < from: Transthoracic Echocardiogram (10.05.18 @ 14:28) >  1. Mitral annular calcification, otherwise normal mitral  valve. Minimal mitral regurgitation.  2. Calcified aortic valve. Peak transaortic valve gradient  equals 13 mm Hg, mean transaortic valve gradient equals 8  mm Hg, aorticvalve velocity time integral equals 35 cm,  estimated aortic valve area equals 2.7 sqcm. Minimal aortic  regurgitation.  3. Proximal septal thickening, otherwise normal left  ventricular internal dimensions and wall thicknesses. The  proximal septum measures 1.4cm.  4. Hyperdynamic left ventricular systolic function. No  segmental wall motion abnormalities.  5. Normal diastolic function  6. Normal right ventricular size and function.

## 2018-10-06 NOTE — PROGRESS NOTE ADULT - ASSESSMENT
78 year old female PMHX of anxiety, HLD, HTN, B/l TKA, depression, and Sjogren's syndrome presented with sepsis from left knee periprosthetic infection. S/p left knee washout and polyethylene liner exchange on 10/5.    PLAN    Neuro: Anxiety, pain control  - Continue tylenol and oxy PRN for pain  - Continue home xanax PRN for anxiety     Respiratory: No active issues  - Incentive spirometry and out of bed to prevent atelectasis  - F/u am CXR    CV: Hypotension responsive to fluids; sepsis   - Continue statin  - Hemodynamic monitoring    GI: No active issues  - CLD    Renal: Hponatremia  - NS @ 50/hr  - D/c roman on POD1    Heme: No active issues  -  BID for VTE ppx    ID: Sepsis, left knee periporesthetic septic arthritis  - D/c drain on POD2, remove VAC on POD3  - Continue vancomycin, ancef  - Daily blood cultures  - Will likely need PICC    Endo: No active issues  - Monitor serum glucose on BMP    Dispo: SICU. Full code. 78 year old female PMHX of anxiety, HLD, HTN, B/l TKA, depression, and Sjogren's syndrome presented with sepsis from left knee periprosthetic infection. S/p left knee washout and polyethylene liner exchange on 10/5.    PLAN    Neuro: Anxiety, pain control  - Continue tylenol and oxy PRN for pain  - Continue home xanax PRN for anxiety     Respiratory: No active issues  - Incentive spirometry and out of bed to prevent atelectasis    CV: Hypotension responsive to fluids; sepsis   - Continue statin  - Hemodynamic monitoring    GI: No active issues  - CLD    Renal: Hyponatremia  - NS @ 50/hr  - D/c roman on POD1    Heme: No active issues  -  BID for VTE ppx    ID: Sepsis, left knee periprosthetic septic arthritis  - D/c drain on POD2, remove VAC on POD3  - Continue vancomycin, ancef  - Vanc trough 18:00 on 10/6  - Daily blood cultures  - Will likely need PICC    Endo: No active issues  - Monitor serum glucose on BMP    Dispo: SICU. Full code. 78 year old female PMHX of anxiety, HLD, HTN, B/l TKA, depression, and Sjogren's syndrome presented with sepsis from left knee periprosthetic infection. S/p left knee washout and polyethylene liner exchange on 10/5.    PLAN    Neuro: Anxiety, pain control  - Continue tylenol and oxy PRN for pain  - Continue home xanax PRN for anxiety     Respiratory: No active issues  - Incentive spirometry and out of bed to prevent atelectasis  - AM CXR with lungs clear bilaterally    CV: Hypotension responsive to fluids; sepsis   - Continue home statin, lasix  - Hemodynamic monitoring    GI: No active issues  - Regular diet    Renal: Hyponatremia improving.  - IV locked  - D/c roman today    Heme: No active issues  -  BID for VTE ppx    ID: Sepsis, left knee periprosthetic septic arthritis  - D/c drain on POD2, remove VAC on POD3  - Continue vancomycin, ancef  - Vanc trough 18:00 on 10/6  - Daily blood cultures  - Will need PICC for long term antibiotics    Endo: No active issues  - Monitor serum glucose on BMP    Dispo: SICU. Full code.

## 2018-10-06 NOTE — PROGRESS NOTE ADULT - SUBJECTIVE AND OBJECTIVE BOX
Pt S/E at bedside, no acute events overnight, pain controlled.  Patient in SICU, had surgery last night, irrigation and debredment with polyethylene liner exchange  Cultures growing MSSA at this time in blood and left knee    Vital Signs Last 24 Hrs  T(C): 37.2 (06 Oct 2018 07:00), Max: 37.2 (06 Oct 2018 07:00)  T(F): 98.9 (06 Oct 2018 07:00), Max: 98.9 (06 Oct 2018 07:00)  HR: 94 (06 Oct 2018 07:00) (73 - 99)  BP: 129/61 (06 Oct 2018 07:00) (120/57 - 182/79)  BP(mean): 88 (06 Oct 2018 07:00) (82 - 116)  RR: 20 (06 Oct 2018 07:00) (13 - 35)  SpO2: 100% (06 Oct 2018 07:00) (65% - 100%)  Gen: NAD, AAOx3    LLE:  Patient has VASILE Draine, record outputs  Pervina INcisional vac in place  Dressing clean dry intact  +EHL/FHL/TA/GS  SILT L3-S1  +DP/PT Pulses  Compartments soft  No calf TTP B/L

## 2018-10-06 NOTE — PROGRESS NOTE ADULT - ATTENDING COMMENTS
I agree with the above note and have personally seen and examined this patient. All pertinent films have been reviewed. Please refer to clinical documentation of the history, physical examinations, data summary, and both assessment and plan as documented above and with which I agree.    moderate pain in knee in SICU. pending floor tranfer  OR yesterday for L TKA I&D, PE exchange. Purulent fluid found, multiple cultures sent  xr: s/p L TKA I&D, no complications    afvss  nad  LLE  dressing cdi  drains w ss output  ivac intact  firing ta/ehl/gcs  silt spn/dpn  2+ dp    doing well in SICU s/p L TKA I&D and PE exchange  pending floor transfer to   asa 325bid x 30 days, venodynes for dvt ppx  ancef/vanc until OR cultures negative  preop cultures growing MSSA, gram stain had indicated GPC in pairs as well but no speciation on cultures  f/u OR cultures, gram stain positive for gpc in pairs  wbat  KI until POD3  ace wrap until POD2  ivac until POD3, then aquacel to be placed  drain x 1 until POD2 if output <50/24 hr shift  picc when blood cultures negative  daily blood cultures  daily labs, cbc/bmp    I spent 30 minutes with the patient and her  today reviewing the surgical findings as well as the chances for success. They understand that she will require long term IV and then PO antibiotics and there is a chance for recurrence of the PJI which would require explantation and 2-stage exchange should this occur.    Bunny Montoya MD  Attending Orthopedic Surgeon

## 2018-10-06 NOTE — PROGRESS NOTE ADULT - SUBJECTIVE AND OBJECTIVE BOX
Subjective: Patient seen and examined. No new events except as noted.     SUBJECTIVE/ROS:  sitting in chair  No chest pain, dyspnea, palpitation, or dizziness.       MEDICATIONS:  MEDICATIONS  (STANDING):  acetaminophen   Tablet .. 975 milliGRAM(s) Oral every 8 hours  aspirin 325 milliGRAM(s) Oral two times a day  atorvastatin 10 milliGRAM(s) Oral at bedtime  ceFAZolin   IVPB 2000 milliGRAM(s) IV Intermittent every 8 hours  docusate sodium 100 milliGRAM(s) Oral three times a day  famotidine    Tablet 20 milliGRAM(s) Oral every 12 hours  furosemide    Tablet 20 milliGRAM(s) Oral two times a day  PARoxetine 20 milliGRAM(s) Oral daily  senna 2 Tablet(s) Oral at bedtime  vancomycin  IVPB 1000 milliGRAM(s) IV Intermittent every 12 hours      PHYSICAL EXAM:  T(C): 37.2 (10-06-18 @ 11:00), Max: 37.2 (10-06-18 @ 07:00)  HR: 86 (10-06-18 @ 11:00) (73 - 99)  BP: 125/58 (10-06-18 @ 11:00) (109/58 - 182/79)  RR: 31 (10-06-18 @ 11:00) (13 - 41)  SpO2: 99% (10-06-18 @ 11:00) (96% - 100%)  Wt(kg): --  I&O's Summary    05 Oct 2018 07:01  -  06 Oct 2018 07:00  --------------------------------------------------------  IN: 2337.5 mL / OUT: 1570 mL / NET: 767.5 mL    06 Oct 2018 07:01  -  06 Oct 2018 12:53  --------------------------------------------------------  IN: 125 mL / OUT: 60 mL / NET: 65 mL          Appearance: Normal	  HEENT:   no gross abnormality   Cardiovascular: Normal S1 S2,    Murmur:   Neck: JVP normal  Respiratory: Lungs clear   Gastrointestinal:  Soft, Non-tender  Skin: normal   Neuro: No gross deficits.   Psychiatry:  Mood & affect flat  Ext: No edema      LABS/DATA:    CARDIAC MARKERS:                                11.2   7.4   )-----------( 153      ( 06 Oct 2018 02:46 )             34.3     10-06    134<L>  |  104  |  9   ----------------------------<  128<H>  4.0   |  19<L>  |  0.60    Ca    8.4      06 Oct 2018 02:46  Phos  2.5     10-06  Mg     1.8     10-06    TPro  6.1  /  Alb  2.5<L>  /  TBili  0.3  /  DBili  x   /  AST  27  /  ALT  33  /  AlkPhos  120  10-05    proBNP:   Lipid Profile:   HgA1c:   TSH:     TELE:  EKG:  < from: Transthoracic Echocardiogram (10.05.18 @ 14:28) >  Conclusions:  1. Mitral annular calcification, otherwise normal mitral  valve. Minimal mitral regurgitation.  2. Calcified aortic valve. Peak transaortic valve gradient  equals 13 mm Hg, mean transaortic valve gradient equals 8  mm Hg, aorticvalve velocity time integral equals 35 cm,  estimated aortic valve area equals 2.7 sqcm. Minimal aortic  regurgitation.  3. Proximal septal thickening, otherwise normal left  ventricular internal dimensions and wall thicknesses. The  proximal septum measures 1.4cm.  4. Hyperdynamic left ventricular systolic function. No  segmental wall motion abnormalities.  5. Normal diastolic function  6. Normal right ventricular size and function.  *** No previous Echo exam.  -----------------------------------------    < end of copied text >

## 2018-10-06 NOTE — PROGRESS NOTE ADULT - ASSESSMENT
hypotension resolved  likely due to sirs from bacteremia     septic joint  s/p  wash out   anbx  ID consult noted   follow up culture   no veg on echo     HTN  had episode of hypotension  now resolved  cont to monitor and treat as needed

## 2018-10-06 NOTE — PROGRESS NOTE ADULT - ATTENDING COMMENTS
Umberto Hendrix  Attending Physician   Division of Infectious Disease  Pager #[phone]  After 5pm/weekend or no response, call #[phone]    Please call the ID service [phone] with questions or concerns over the weekend.

## 2018-10-06 NOTE — PROGRESS NOTE ADULT - ASSESSMENT
78Fw/ hx of Sjogren's syndrome, HTN, HLD, BL TKA (2008 with Dr. Dorado, S) and depression presented to ED c/o L knee pain for 4 days with +fever and chills, found to have S/aureus septic joint c/b sepsis 2/2 MSSA bacteremia, now pending  L knee I&D, PE exchange vs. stage 1 revision TKA with antibiotic spacer placement, currently hemodynamically stable on Vancomycin.

## 2018-10-06 NOTE — PROGRESS NOTE ADULT - SUBJECTIVE AND OBJECTIVE BOX
24 HOUR EVENTS: Lactate cleared. IV fluids decreased to 50/hr due to hyponatremia. TTE was done which showed no evidence of endocarditis. Went to OR for washout of left knee and exchange of polyethylene liner.     CASEY TIJERINA is a 78y Female w/ hx of Sjogren's syndrome, HTN, HLD, BL TKA (2008 with Dr. Dorado, [facility]) and depression who presented to ED c/o L knee pain for 4 days. Patient arrived and was seen in ED early AM of 10/04 and knee was aspirated by orthopedics. Cultures were growing GPC in both knee aspirate and blood. Patient left AMA initially to follow up at [facility], but came back the same afternoon with fevers again. Denied other recent illnesses. SICU consulted for tachycardia and hypotension concerning for septic shock in the setting of periprosthetic knee infection. Patient received one liter of crystalloid in ED and hypotension responded well from SBP in 90s to . Tachycardia resolved. Pt. febrile to 103 in the ED.      NEURO  RASS: 0  Exam: Alert and oriented      RESPIRATORY  RR: 26 (10-06-18 @ 01:00) (17 - 35)  SpO2: 97% (10-06-18 @ 01:00) (65% - 100%)  Exam: Unlabored, clear to auscultation bilaterally      CARDIOVASCULAR  HR: 73 (10-06-18 @ 01:00) (73 - 99)  BP: 131/90 (10-06-18 @ 01:00) (115/53 - 182/79)  BP(mean): 106 (10-06-18 @ 01:00) (77 - 116)  VBG - ( 05 Oct 2018 08:07 )  pH: 7.44  /  pCO2: 33    /  pO2: 69    / HCO3: 22    / Base Excess: -0.9  /  SaO2: 95     Lactate: 0.7        Exam:  Cardiac Rhythm: Regular  Perfusion     [X]Adequate   [ ]Inadequate  Mentation   [X]Normal       [ ]Reduced  Extremities  [X]Warm         [ ]Cool  Volume Status [ ]Hypervolemic [X]Euvolemic [ ]Hypovolemic      GI/NUTRITION  Exam: Soft, NT, ND  Diet: NP    GENITOURINARY  I&O's Detail    10-04 @ 07:01  -  10-05 @ 07:00  --------------------------------------------------------  IN:    sodium chloride 0.9%: 750 mL    Solution: 300 mL    Solution: 250 mL  Total IN: 1300 mL    OUT:    Voided: 250 mL  Total OUT: 250 mL    Total NET: 1050 mL      10-05 @ 07:01  -  10-06 @ 01:41  --------------------------------------------------------  IN:    lactated ringers.: 400 mL    sodium chloride 0.9%: 375 mL    Sodium Chloride 0.9% IV Bolus: 500 mL    Solution: 250 mL  Total IN: 1525 mL    OUT:    Voided: 1100 mL  Total OUT: 1100 mL    Total NET: 425 mL      Weight (kg): 75.7 (10-05 @ 01:45)  10-05    130<L>  |  98  |  11  ----------------------------<  134<H>  3.2<L>   |  21<L>  |  0.71    Ca    8.3<L>      05 Oct 2018 02:29    TPro  6.1  /  Alb  2.5<L>  /  TBili  0.3  /  DBili  x   /  AST  27  /  ALT  33  /  AlkPhos  120  10-05    [X] Diaz catheter, indication: perioperative      HEMATOLOGIC  Meds:  BID  [x] VTE Prophylaxis                        11.4   7.4   )-----------( 167      ( 05 Oct 2018 02:29 )             33.5     PT/INR - ( 05 Oct 2018 02:29 )   PT: 15.2 sec;   INR: 1.39 ratio         PTT - ( 05 Oct 2018 02:29 )  PTT:32.1 sec  Transfusion     [ ] PRBC   [ ] Platelets   [ ] FFP   [ ] Cryoprecipitate      INFECTIOUS DISEASES  T(C): 36.7 (10-06-18 @ 01:00), Max: 37.8 (10-05-18 @ 01:45)  Wt(kg): --  WBC Count: 7.4 K/uL (10-05 @ 02:29)    Recent Cultures:  Specimen Source: .Blood Blood-Peripheral, 10-04 @ 17:24; Results   Growth in aerobic bottle: Gram Positive Cocci in Clusters  Growth in anaerobic bottle: Gram Positive Cocci in Clusters; Gram Stain:   Growth in aerobic bottle: Gram Positive Cocci in Clusters  Growth in anaerobic bottle: Gram Positive Cocci in Clusters; Organism: --  Specimen Source: .Urine Clean Catch (Midstream), 10-04 @ 09:37; Results   No growth; Gram Stain: --; Organism: --  Specimen Source: .Body Fluid Knee Fluid, 10-04 @ 05:44; Results   Moderate Staphylococcus aureus; Gram Stain:   polymorphonuclear leukocytes seen  Gram Positive Cocci in Clusters seen  Gram positive cocci in pairs seen  by cytocentrifuge; Organism: --  Specimen Source: .Blood Blood-Peripheral, 10-03 @ 21:52; Results   Growth in aerobic and anaerobic bottles: Staphylococcus aureus  See previous culture [id number]; Gram Stain:   Growth in aerobic bottle: Gram Positive Cocci in Clusters  Growth in anaerobic bottle: Gram Positive Cocci in Clusters; Organism: Blood Culture PCR    Meds: Ancef, vancomycin      ENDOCRINE  Blood Glucose 134      CODE STATUS: Full code. 24 HOUR EVENTS: Lactate cleared. IV fluids decreased to 50/hr due to hyponatremia. TTE was done which showed no evidence of endocarditis. Went to OR for washout of left knee and exchange of polyethylene liner.     CASEY TIJERINA is a 78y Female w/ hx of Sjogren's syndrome, HTN, HLD, BL TKA (2008 with Dr. Dorado, [facility]) and depression who presented to ED c/o L knee pain for 4 days. Patient arrived and was seen in ED early AM of 10/04 and knee was aspirated by orthopedics. Cultures were growing GPC in both knee aspirate and blood. Patient left AMA initially to follow up at [facility], but came back the same afternoon with fevers again. Denied other recent illnesses. SICU consulted for tachycardia and hypotension concerning for septic shock in the setting of periprosthetic knee infection. Patient received one liter of crystalloid in ED and hypotension responded well from SBP in 90s to . Tachycardia resolved. Pt. febrile to 103 in the ED.      NEURO  RASS: +1  Exam: Alert and oriented      RESPIRATORY  RR: 26 (10-06-18 @ 01:00) (17 - 35)  SpO2: 97% (10-06-18 @ 01:00) (65% - 100%)  Exam: Unlabored respirations, clear to auscultation bilaterally      CARDIOVASCULAR  HR: 73 (10-06-18 @ 01:00) (73 - 99)  BP: 131/90 (10-06-18 @ 01:00) (115/53 - 182/79)  BP(mean): 106 (10-06-18 @ 01:00) (77 - 116)  VBG - ( 05 Oct 2018 08:07 )  pH: 7.44  /  pCO2: 33    /  pO2: 69    / HCO3: 22    / Base Excess: -0.9  /  SaO2: 95     Lactate: 0.7        Exam:  Cardiac Rhythm: Regular  Perfusion     [X]Adequate   [ ]Inadequate  Mentation   [X]Normal       [ ]Reduced  Extremities  [X]Warm         [ ]Cool  Volume Status [ ]Hypervolemic [X]Euvolemic [ ]Hypovolemic      GI/NUTRITION  Exam: Soft, NT, ND  Diet: CLD    GENITOURINARY  I&O's Detail    10-04 @ 07:01  -  10-05 @ 07:00  --------------------------------------------------------  IN:    sodium chloride 0.9%: 750 mL    Solution: 300 mL    Solution: 250 mL  Total IN: 1300 mL    OUT:    Voided: 250 mL  Total OUT: 250 mL    Total NET: 1050 mL      10-05 @ 07:01  -  10-06 @ 01:41  --------------------------------------------------------  IN:    lactated ringers.: 400 mL    sodium chloride 0.9%: 375 mL    Sodium Chloride 0.9% IV Bolus: 500 mL    Solution: 250 mL  Total IN: 1525 mL    OUT:    Voided: 1100 mL  Total OUT: 1100 mL    Total NET: 425 mL      Weight (kg): 75.7 (10-05 @ 01:45)  10-05    130<L>  |  98  |  11  ----------------------------<  134<H>  3.2<L>   |  21<L>  |  0.71    Ca    8.3<L>      05 Oct 2018 02:29    TPro  6.1  /  Alb  2.5<L>  /  TBili  0.3  /  DBili  x   /  AST  27  /  ALT  33  /  AlkPhos  120  10-05    [X] Diaz catheter, indication: perioperative      HEMATOLOGIC  Meds:  BID  [x] VTE Prophylaxis                        11.4   7.4   )-----------( 167      ( 05 Oct 2018 02:29 )             33.5     PT/INR - ( 05 Oct 2018 02:29 )   PT: 15.2 sec;   INR: 1.39 ratio         PTT - ( 05 Oct 2018 02:29 )  PTT:32.1 sec      INFECTIOUS DISEASES  T(C): 36.7 (10-06-18 @ 01:00), Max: 37.8 (10-05-18 @ 01:45)  WBC Count: 7.4 K/uL (10-05 @ 02:29)  Exam: Drain sanguinous    Recent Cultures:  Specimen Source: .Blood Blood-Peripheral, 10-04 @ 17:24; Results   Growth in aerobic bottle: Gram Positive Cocci in Clusters  Growth in anaerobic bottle: Gram Positive Cocci in Clusters; Gram Stain:   Growth in aerobic bottle: Gram Positive Cocci in Clusters  Growth in anaerobic bottle: Gram Positive Cocci in Clusters; Organism: --  Specimen Source: .Urine Clean Catch (Midstream), 10-04 @ 09:37; Results   No growth; Gram Stain: --; Organism: --  Specimen Source: .Body Fluid Knee Fluid, 10-04 @ 05:44; Results   Moderate Staphylococcus aureus; Gram Stain:   polymorphonuclear leukocytes seen  Gram Positive Cocci in Clusters seen  Gram positive cocci in pairs seen  by cytocentrifuge; Organism: --  Specimen Source: .Blood Blood-Peripheral, 10-03 @ 21:52; Results   Growth in aerobic and anaerobic bottles: Staphylococcus aureus  See previous culture [id number]; Gram Stain:   Growth in aerobic bottle: Gram Positive Cocci in Clusters  Growth in anaerobic bottle: Gram Positive Cocci in Clusters; Organism: Blood Culture PCR    Meds: Ancef, vancomycin      ENDOCRINE  Blood Glucose 134      CODE STATUS: Full code.

## 2018-10-06 NOTE — PHYSICAL THERAPY INITIAL EVALUATION ADULT - PERTINENT HX OF CURRENT PROBLEM, REHAB EVAL
Pt is a 79 y/o female admitted to [facility] on 10/4/18 w/ hx of Sjogren's syndrome, HTN, HLD, BL TKA (2008 with Dr. Dorado, [facility]) and depression presented to ED c/o L knee pain for 4 days. Pt states that pain is worse with walking but can still bear weight. Pt denies radiation of pain. Pt denies numbness, tingling, or burning in the LLE. Fell 2 weeks ago but was still ambulating. +fever/chills. Pt is community ambulator without an assistive device.

## 2018-10-06 NOTE — PHYSICAL THERAPY INITIAL EVALUATION ADULT - PRECAUTIONS/LIMITATIONS, REHAB EVAL
Was seen in ED early this AM and knee was aspirated. Growing GPC in both knee aspirate and blood. Patient left AMA initially to follow up at [facility], but came back this afternoon with fevers again.  Pt is now s/p Irrigation and debridement of knee with replacement of polyethylene liner on 10/6.

## 2018-10-06 NOTE — PROGRESS NOTE ADULT - ASSESSMENT
a/p 79 yo Female w/ Left knee periprosthetic joint infection s/p I&D and polyethylene exchange  Cultures from aspiration and blood growing back MSSA at this time  Await final cultures from OR  ID recommendations appreciated  Record VASILE drain outputs, to be removed on POD2, can remove ace POD 2 also  Will Remove pravena incisional vac on POD3 and discontinue Knee Immobilizer at that time  Daily BCx's until negative in preparation for PICC Line  WBAT  PT/OT  Can DC roman today if okay with SICU  SICU Care appreciated  Will discuss with Dr. Montoya and change plan as needed

## 2018-10-06 NOTE — BRIEF OPERATIVE NOTE - PROCEDURE
<<-----Click on this checkbox to enter Procedure Irrigation and debridement of knee with replacement of polyethylene liner  10/06/2018    Active  AVJOB

## 2018-10-06 NOTE — PROGRESS NOTE ADULT - SUBJECTIVE AND OBJECTIVE BOX
77 yo female PMhx anxiety and b/l total knee replacements (left knee done 2007) c/o fever and L knee pain. Patient seen at [facility] ED last night for left knee pain and fever's, was diagnose with infected L knee joint after arthrocentesis and was advised to stay for admission but left AMA after advising ED staff she would go to [facility]. Went home in AM and continued to having fevers at home with increasing pain in L knee so came back. Fever tmax ~104 at home rectally. No dizziness, no chest pain, no n/v, no weakness, no numbness/tingling. Patient seen now resting comfortably.  Had  washout of left knee. Patient had a hypotensive episode that responded to bolus of fluid.  currently in ICU and stable          PAST MEDICAL & SURGICAL HISTORY:  Hyperlipemia  Anxiety  H/O total knee replacement: bilateral  History of appendectomy  S/P cataract surgery        MEDICATIONS  (STANDING):  acetaminophen   Tablet .. 975 milliGRAM(s) Oral every 8 hours  atorvastatin 10 milliGRAM(s) Oral at bedtime  ceFAZolin   IVPB 2000 milliGRAM(s) IV Intermittent every 8 hours  ceFAZolin   IVPB      docusate sodium 100 milliGRAM(s) Oral three times a day  famotidine    Tablet 20 milliGRAM(s) Oral every 12 hours  furosemide    Tablet 20 milliGRAM(s) Oral daily  PARoxetine 20 milliGRAM(s) Oral daily  senna 2 Tablet(s) Oral at bedtime  sodium chloride 0.9%. 1000 milliLiter(s) (125 mL/Hr) IV Continuous <Continuous>  vancomycin  IVPB 1000 milliGRAM(s) IV Intermittent every 12 hours    MEDICATIONS  (PRN):  ALPRAZolam 0.5 milliGRAM(s) Oral two times a day PRN anxiety  magnesium hydroxide Suspension 30 milliLiter(s) Oral daily PRN Constipation  oxyCODONE    IR 2.5 milliGRAM(s) Oral every 4 hours PRN Moderate Pain (4 - 6)  oxyCODONE    IR 5 milliGRAM(s) Oral every 4 hours PRN Severe Pain (7 - 10)  zolpidem 5 milliGRAM(s) Oral at bedtime PRN Insomnia  zolpidem 5 milliGRAM(s) Oral at bedtime PRN Insomnia      Soc Hx:  Tobacco: ne  ETOH: occasionally  Drugs: Neg    Family Hx  CAD   sepsis      CONSTITUTIONAL: No weakness, fevers or chills  EYES/ENT: No visual changes;  No vertigo or throat pain   NECK: No pain or stiffness  RESPIRATORY: No cough, wheezing, hemoptysis; No shortness of breath  CARDIOVASCULAR: No chest pain or palpitations  GASTROINTESTINAL: No abdominal or epigastric pain. No nausea, vomiting, or hematemesis; No diarrhea or constipation. No melena or hematochezia.  GENITOURINARY: No dysuria, frequency or hematuria  NEUROLOGICAL: No numbness or weakness  SKIN: No itching, burning, rashes, or lesions   MUSCULOSKELETAL: left leg pain      RR: 26 (10-06-18 @ 01:00) (17 - 35)  SpO2: 97% (10-06-18 @ 01:00) (65% - 100%)  HR: 73 (10-06-18 @ 01:00) (73 - 99)  BP: 131/90 (10-06-18 @ 01:00) (115/53 - 182/79)  BP(mean): 106 (10-06-18 @ 01:00) (77 - 116)  VBG - ( 05 Oct 2018 08:07 )  pH: 7.44  /  pCO2: 33    /  pO2: 69    / HCO3: 22    / Base Excess: -0.9  /  SaO2: 95     Lactate: 0.7        PHYSICAL EXAM:  GENERAL: NAD, well-groomed, well-developed  HEAD:  Atraumatic, Normocephalic  EYES: EOMI, PERRLA, conjunctiva and sclera clear  ENMT: No tonsillar erythema, exudates, or enlargement; Moist mucous membranes, Good dentition, No lesions  NECK: Supple, No JVD, Normal thyroid  NERVOUS SYSTEM:  Alert & Oriented X3, Good concentration; Motor Strength 5/5 B/L upper and lower extremities; DTRs 2+ intact and symmetric  CHEST/LUNG: Clear to percussion bilaterally; No rales, rhonchi, wheezing, or rubs  HEART: Regular rate and rhythm; No murmurs, rubs, or gallops  ABDOMEN: Soft, Nontender, Nondistended; Bowel sounds present  EXTREMITIES: Bruising and swelling of the left LE  sepsis from left knee periprosthetic infection. S/p left knee washout and polyethylene liner exchange on 10/5.  LYMPH: No lymphadenopathy noted  SKIN: No rashes or lesions        LABS:                   CBC Full  -  ( 06 Oct 2018 02:46 )  WBC Count : 7.4 K/uL  Hemoglobin : 11.2 g/dL  Hematocrit : 34.3 %  Platelet Count - Automated : 153 K/uL  Mean Cell Volume : 96.0 fl  Mean Cell Hemoglobin : 31.4 pg  Mean Cell Hemoglobin Concentration : 32.7 gm/dL  Auto Neutrophil # : x  Auto Lymphocyte # : x  Auto Monocyte # : x  Auto Eosinophil # : x  Auto Basophil # : x  Auto Neutrophil % : x  Auto Lymphocyte % : x  Auto Monocyte % : x  Auto Eosinophil % : x  Auto Basophil % : x    10-06    134<L>  |  104  |  9   ----------------------------<  128<H>  4.0   |  19<L>  |  0.60    Ca    8.4      06 Oct 2018 02:46  Phos  2.5     10-06  Mg     1.8     10-06        PT/INR - ( 06 Oct 2018 02:46 )   PT: 14.3 sec;   INR: 1.32 ratio         PTT - ( 06 Oct 2018 02:46 )  PTT:27.2 sec

## 2018-10-06 NOTE — PHYSICAL THERAPY INITIAL EVALUATION ADULT - PLANNED THERAPY INTERVENTIONS, PT EVAL
stair negotiation: GOAL: Pt will be able to negotiate 10 steps +HR independently with reciprocal pattern in 2 weeks./bed mobility training/transfer training/gait training

## 2018-10-06 NOTE — PROGRESS NOTE ADULT - ATTENDING COMMENTS
left knee MSSA prosthetic joint infection s/p I&D / polyethylene liner exchange overnight  MSSA bacteremia  -Ancef  -no endocarditis on TTE yesterday    hyponatremia - likely secondary to SIADH from pain or infection, but improving today  non-anion gap metabolic acidosis - likely from recent saline infusions which have now been stopped    -transfer to floor

## 2018-10-06 NOTE — PHYSICAL THERAPY INITIAL EVALUATION ADULT - ADDITIONAL COMMENTS
Pt lives in a private house with spouse and a few steps to enter. Pt was Ind with all ADLs and amb without AD.

## 2018-10-06 NOTE — PHYSICAL THERAPY INITIAL EVALUATION ADULT - GAIT DEVIATIONS NOTED, PT EVAL
decreased velocity of limb motion/decreased ofelia/decreased step length/decreased weight-shifting ability

## 2018-10-06 NOTE — PROGRESS NOTE ADULT - ASSESSMENT
77 yo woman with a hx of a fall two weeks ago present with pain and swelling of the left knee found to have an infection of the left knee  periprosthetic infection. S/p left knee washout and polyethylene liner exchange on 10/5. IN ICU for close post op care for cardiopulmonary issues.  Patient currently stable and ready for transfer to regular floor .

## 2018-10-07 LAB
ANION GAP SERPL CALC-SCNC: 10 MMOL/L — SIGNIFICANT CHANGE UP (ref 5–17)
BUN SERPL-MCNC: 11 MG/DL — SIGNIFICANT CHANGE UP (ref 7–23)
CA-I BLD-SCNC: 1.18 MMOL/L — SIGNIFICANT CHANGE UP (ref 1.12–1.3)
CALCIUM SERPL-MCNC: 8.2 MG/DL — LOW (ref 8.4–10.5)
CHLORIDE SERPL-SCNC: 100 MMOL/L — SIGNIFICANT CHANGE UP (ref 96–108)
CO2 SERPL-SCNC: 23 MMOL/L — SIGNIFICANT CHANGE UP (ref 22–31)
CREAT SERPL-MCNC: 0.6 MG/DL — SIGNIFICANT CHANGE UP (ref 0.5–1.3)
GLUCOSE SERPL-MCNC: 134 MG/DL — HIGH (ref 70–99)
GRAM STN FLD: SIGNIFICANT CHANGE UP
HCT VFR BLD CALC: 32.1 % — LOW (ref 34.5–45)
HGB BLD-MCNC: 10.6 G/DL — LOW (ref 11.5–15.5)
MAGNESIUM SERPL-MCNC: 2.1 MG/DL — SIGNIFICANT CHANGE UP (ref 1.6–2.6)
MCHC RBC-ENTMCNC: 30.5 PG — SIGNIFICANT CHANGE UP (ref 27–34)
MCHC RBC-ENTMCNC: 33 GM/DL — SIGNIFICANT CHANGE UP (ref 32–36)
MCV RBC AUTO: 92.2 FL — SIGNIFICANT CHANGE UP (ref 80–100)
PHOSPHATE SERPL-MCNC: 2.5 MG/DL — SIGNIFICANT CHANGE UP (ref 2.5–4.5)
PLATELET # BLD AUTO: 196 K/UL — SIGNIFICANT CHANGE UP (ref 150–400)
POTASSIUM SERPL-MCNC: 3.8 MMOL/L — SIGNIFICANT CHANGE UP (ref 3.5–5.3)
POTASSIUM SERPL-SCNC: 3.8 MMOL/L — SIGNIFICANT CHANGE UP (ref 3.5–5.3)
RBC # BLD: 3.48 M/UL — LOW (ref 3.8–5.2)
RBC # FLD: 14.1 % — SIGNIFICANT CHANGE UP (ref 10.3–14.5)
SODIUM SERPL-SCNC: 133 MMOL/L — LOW (ref 135–145)
SPECIMEN SOURCE: SIGNIFICANT CHANGE UP
SPECIMEN SOURCE: SIGNIFICANT CHANGE UP
WBC # BLD: 6.12 K/UL — SIGNIFICANT CHANGE UP (ref 3.8–10.5)
WBC # FLD AUTO: 6.12 K/UL — SIGNIFICANT CHANGE UP (ref 3.8–10.5)

## 2018-10-07 RX ADMIN — Medication 15 MILLIGRAM(S): at 22:00

## 2018-10-07 RX ADMIN — Medication 100 MILLIGRAM(S): at 21:41

## 2018-10-07 RX ADMIN — Medication 20 MILLIGRAM(S): at 05:47

## 2018-10-07 RX ADMIN — Medication 0.5 MILLIGRAM(S): at 18:18

## 2018-10-07 RX ADMIN — Medication 975 MILLIGRAM(S): at 18:34

## 2018-10-07 RX ADMIN — Medication 20 MILLIGRAM(S): at 13:35

## 2018-10-07 RX ADMIN — Medication 325 MILLIGRAM(S): at 05:47

## 2018-10-07 RX ADMIN — Medication 975 MILLIGRAM(S): at 05:47

## 2018-10-07 RX ADMIN — Medication 15 MILLIGRAM(S): at 06:05

## 2018-10-07 RX ADMIN — FAMOTIDINE 20 MILLIGRAM(S): 10 INJECTION INTRAVENOUS at 05:47

## 2018-10-07 RX ADMIN — Medication 15 MILLIGRAM(S): at 05:47

## 2018-10-07 RX ADMIN — Medication 975 MILLIGRAM(S): at 06:05

## 2018-10-07 RX ADMIN — Medication 20 MILLIGRAM(S): at 18:17

## 2018-10-07 RX ADMIN — ATORVASTATIN CALCIUM 10 MILLIGRAM(S): 80 TABLET, FILM COATED ORAL at 21:42

## 2018-10-07 RX ADMIN — Medication 325 MILLIGRAM(S): at 18:17

## 2018-10-07 RX ADMIN — Medication 15 MILLIGRAM(S): at 13:35

## 2018-10-07 RX ADMIN — Medication 100 MILLIGRAM(S): at 05:47

## 2018-10-07 RX ADMIN — Medication 100 MILLIGRAM(S): at 05:46

## 2018-10-07 RX ADMIN — Medication 975 MILLIGRAM(S): at 18:17

## 2018-10-07 RX ADMIN — FAMOTIDINE 20 MILLIGRAM(S): 10 INJECTION INTRAVENOUS at 18:18

## 2018-10-07 RX ADMIN — Medication 15 MILLIGRAM(S): at 14:00

## 2018-10-07 RX ADMIN — SENNA PLUS 2 TABLET(S): 8.6 TABLET ORAL at 21:44

## 2018-10-07 RX ADMIN — Medication 100 MILLIGRAM(S): at 13:34

## 2018-10-07 RX ADMIN — Medication 100 MILLIGRAM(S): at 21:42

## 2018-10-07 RX ADMIN — Medication 15 MILLIGRAM(S): at 21:42

## 2018-10-07 RX ADMIN — Medication 100 MILLIGRAM(S): at 13:35

## 2018-10-07 NOTE — PROGRESS NOTE ADULT - ASSESSMENT
79 yo woman with a hx of a fall two weeks ago present with pain and swelling of the left knee found to have an infection of the left knee  periprosthetic infection. S/p left knee washout and polyethylene liner exchange on 10/5. IN ICU for close post op care for cardiopulmonary issues.  Patient currently stable and ready for transfer to regular floor .

## 2018-10-07 NOTE — PROVIDER CONTACT NOTE (CRITICAL VALUE NOTIFICATION) - ACTION/TREATMENT ORDERED:
PA notified, repeat blood cx to be drawn. Will cont to monitor.
SICU transfer in progress, MD to assess and possibly repeat in SICU, handoff given
pt on ancef. will monitor. no further orders noted.
pt on iv abx ancef. no further orders given. will monitor.
pt on iv abx and Md did not have further orders. will monitor.

## 2018-10-07 NOTE — PROVIDER CONTACT NOTE (CRITICAL VALUE NOTIFICATION) - ASSESSMENT
[facility]
Pt groggy, unable to hold conversation. LLE swollen, yellow colored, pt continues to attempt to get OOB despite educated otherwise.   Critical values reported of hg 6.7, hct 21, lactate 2.7
critical value called in
pt had cultures sent and resulted
see results above

## 2018-10-07 NOTE — PROGRESS NOTE ADULT - ASSESSMENT
S/PI&D and poly exchange LT TKR      Plan    f/u cx's  con't ancef  OOB/PT   ck labs         Marylou Lange PA-C   Beeper    [id number] S/PI&D and poly exchange LT TKR      Plan    f/u cx's  con't ancef  OOB/PT   ck labs  d/c VASILE and ace bandage         Marylou Lange PA-C   Beeper    [id number]

## 2018-10-07 NOTE — PROGRESS NOTE ADULT - ATTENDING COMMENTS
I agree with the above note and have personally seen and examined this patient. All pertinent films have been reviewed. Please refer to clinical documentation of the history, physical examinations, data summary, and both assessment and plan as documented above and with which I agree.    pain controlled  now on 7T from SICU  OR cultures growing MSSA    afvss  nad  LLE  ivac intact  drain dc this AM  ace dc this AM  5/5 ta/ehl/gcs  silt L4-S1  2+ dp    doing well in SICU s/p L TKA I&D and PE exchange  asa 325bid x 30 days, venodynes for dvt ppx  ancef per ID for MSSA  wbat  KI until POD3  ivac until POD3, then aquacel to be placed  picc when blood cultures negative, remain positive  daily blood cultures  daily labs, cbc/bmp    Bunny Montoya MD  Attending Orthopedic Surgeon . I agree with the above note and have personally seen and examined this patient. All pertinent films have been reviewed. Please refer to clinical documentation of the history, physical examinations, data summary, and both assessment and plan as documented above and with which I agree.    pain controlled  now on 7T from SICU  OR cultures growing MSSA    afvss  nad  LLE  KI was removed by PT per report, replaced today on rounds  ivac intact  drain dc this AM  ace dc this AM  5/5 ta/ehl/gcs  silt L4-S1  2+ dp    doing well in SICU s/p L TKA I&D and PE exchange  asa 325bid x 30 days, venodynes for dvt ppx  ancef per ID for MSSA  wbat  KI until POD3  ivac until POD3, then aquacel to be placed  picc when blood cultures negative, remain positive with MSSA  daily blood cultures  daily labs, cbc/bmp    Bunny Montoya MD  Attending Orthopedic Surgeon .

## 2018-10-07 NOTE — PROGRESS NOTE ADULT - SUBJECTIVE AND OBJECTIVE BOX
77 yo female PMhx anxiety and b/l total knee replacements (left knee done 2007) c/o fever and L knee pain. Patient seen at [facility] ED last night for left knee pain and fever's, was diagnose with infected L knee joint after arthrocentesis and was advised to stay for admission but left AMA after advising ED staff she would go to [facility]. Went home in AM and continued to having fevers at home with increasing pain in L knee so came back. Fever tmax ~104 at home rectally. No dizziness, no chest pain, no n/v, no weakness, no numbness/tingling. Patient seen now resting comfortably.  Had  washout of left knee. Patient had a hypotensive episode that responded to bolus of fluid.  Currently in ICU and stable to be transferred out  Seen on ortho floor 10/7  Resting comfortably now no chest pain or sob or abdominal pain.  Stressed incentive spirometry to patient          PAST MEDICAL & SURGICAL HISTORY:  Hyperlipemia  Anxiety  H/O total knee replacement: bilateral  History of appendectomy  S/P cataract surgery        MEDICATIONS  (STANDING):  acetaminophen   Tablet .. 975 milliGRAM(s) Oral every 8 hours  atorvastatin 10 milliGRAM(s) Oral at bedtime  ceFAZolin   IVPB 2000 milliGRAM(s) IV Intermittent every 8 hours  ceFAZolin   IVPB      docusate sodium 100 milliGRAM(s) Oral three times a day  famotidine    Tablet 20 milliGRAM(s) Oral every 12 hours  furosemide    Tablet 20 milliGRAM(s) Oral daily  PARoxetine 20 milliGRAM(s) Oral daily  senna 2 Tablet(s) Oral at bedtime  sodium chloride 0.9%. 1000 milliLiter(s) (125 mL/Hr) IV Continuous <Continuous>  vancomycin  IVPB 1000 milliGRAM(s) IV Intermittent every 12 hours    MEDICATIONS  (PRN):  ALPRAZolam 0.5 milliGRAM(s) Oral two times a day PRN anxiety  magnesium hydroxide Suspension 30 milliLiter(s) Oral daily PRN Constipation  oxyCODONE    IR 2.5 milliGRAM(s) Oral every 4 hours PRN Moderate Pain (4 - 6)  oxyCODONE    IR 5 milliGRAM(s) Oral every 4 hours PRN Severe Pain (7 - 10)  zolpidem 5 milliGRAM(s) Oral at bedtime PRN Insomnia  zolpidem 5 milliGRAM(s) Oral at bedtime PRN Insomnia      Soc Hx:  Tobacco: ne  ETOH: occasionally  Drugs: Neg    Family Hx  CAD   sepsis      CONSTITUTIONAL: No weakness, fevers or chills  EYES/ENT: No visual changes;  No vertigo or throat pain   NECK: No pain or stiffness  RESPIRATORY: No cough, wheezing, hemoptysis; No shortness of breath  CARDIOVASCULAR: No chest pain or palpitations  GASTROINTESTINAL: No abdominal or epigastric pain. No nausea, vomiting, or hematemesis; No diarrhea or constipation. No melena or hematochezia.  GENITOURINARY: No dysuria, frequency or hematuria  Musculoskeletal:  s/p I&D and spacer to left knee   NEUROLOGICAL: No numbness or weakness  SKIN: No itching, burning, rashes, or lesions   MUSCULOSKELETAL: left leg pain        T(C): 37 (10-07-18 @ 04:16), Max: 37.2 (10-06-18 @ 11:00)  HR: 82 (10-07-18 @ 04:16) (79 - 93)  BP: 128/64 (10-07-18 @ 04:16) (106/58 - 150/69)  RR: 18 (10-07-18 @ 04:16) (18 - 41)  SpO2: 95% (10-07-18 @ 04:16) (95% - 100%)  Wt(kg): --      PHYSICAL EXAM:  GENERAL: NAD, well-groomed, well-developed  HEAD:  Atraumatic, Normocephalic  EYES: EOMI, PERRLA, conjunctiva and sclera clear  ENMT: No tonsillar erythema, exudates, or enlargement; Moist mucous membranes, Good dentition, No lesions  NECK: Supple, No JVD, Normal thyroid  NERVOUS SYSTEM:  Alert & Oriented X3, Good concentration; Motor Strength 5/5 B/L upper and lower extremities; DTRs 2+ intact and symmetric  CHEST/LUNG: Clear to percussion bilaterally; No rales, rhonchi, wheezing, or rubs  HEART: Regular rate and rhythm; No murmurs, rubs, or gallops  ABDOMEN: Soft, Nontender, Nondistended; Bowel sounds present  EXTREMITIES: Bruising and swelling of the left LE  sepsis from left knee periprosthetic infection. S/p left knee washout and polyethylene liner exchange on 10/5.  LYMPH: No lymphadenopathy noted  SKIN: No rashes or lesions        LABS:           +            11.2   7.4   )-----------( 153      ( 06 Oct 2018 02:46 )             34.3     10-06    134<L>  |  104  |  9   ----------------------------<  128<H>  4.0   |  19<L>  |  0.60    Ca    8.4      06 Oct 2018 02:46  Phos  2.5     10-06  Mg     1.8     10-06

## 2018-10-07 NOTE — PROGRESS NOTE ADULT - SUBJECTIVE AND OBJECTIVE BOX
Patient is a 78y old  Female who presents with a chief complaint of L TKA acute PJI (06 Oct 2018 12:53)      POST OPERATIVE DAY #: 2    T(C): 37 (10-07-18 @ 04:16), Max: 37.2 (10-06-18 @ 11:00)  HR: 82 (10-07-18 @ 04:16) (79 - 93)  BP: 128/64 (10-07-18 @ 04:16) (106/58 - 150/69)  RR: 18 (10-07-18 @ 04:16) (18 - 41)  SpO2: 95% (10-07-18 @ 04:16) (95% - 100%)  Wt(kg): --    PHYSICAL EXAM:  NAD, Alert  EXT:  Lt Knee: Dressing C/D/I;   Drain intact 10cc over last shift  (+) DF/PF;  EHL FHL:  No gross Sensation deficits noted   (+) Distal Pulses;   B/L, PAS     LABS:                        11.2<L>  7.4   )-----------( 153      ( 06 Oct 2018 02:46 )             34.3<L>    10-06    134<L>  |  104  |  9   ----------------------------<  128<H>  4.0   |  19<L>  |  0.60      PT/INR - ( 06 Oct 2018 02:46 )   PT: 14.3 sec;   INR: 1.32 ratio         PTT - ( 06 Oct 2018 02:46 )  PTT:27.2 sec

## 2018-10-07 NOTE — PROVIDER CONTACT NOTE (CRITICAL VALUE NOTIFICATION) - TEST AND RESULT REPORTED:
gram stain tissue culture left knee synovial #1 numerous polymorphonuclear leukocytes seen per low power field and rare gram + cocci in pairs seen per oil power field

## 2018-10-07 NOTE — PROGRESS NOTE ADULT - SUBJECTIVE AND OBJECTIVE BOX
Subjective: Patient seen and examined. No new events except as noted.     SUBJECTIVE/ROS:  resting       MEDICATIONS:  MEDICATIONS  (STANDING):  acetaminophen   Tablet .. 975 milliGRAM(s) Oral every 8 hours  aspirin 325 milliGRAM(s) Oral two times a day  atorvastatin 10 milliGRAM(s) Oral at bedtime  ceFAZolin   IVPB 2000 milliGRAM(s) IV Intermittent every 8 hours  docusate sodium 100 milliGRAM(s) Oral three times a day  famotidine    Tablet 20 milliGRAM(s) Oral every 12 hours  furosemide    Tablet 20 milliGRAM(s) Oral two times a day  ketorolac   Injectable 15 milliGRAM(s) IV Push every 8 hours  PARoxetine 20 milliGRAM(s) Oral daily  senna 2 Tablet(s) Oral at bedtime      PHYSICAL EXAM:  T(C): 37 (10-07-18 @ 04:16), Max: 37.2 (10-06-18 @ 11:00)  HR: 82 (10-07-18 @ 04:16) (79 - 93)  BP: 128/64 (10-07-18 @ 04:16) (106/58 - 150/69)  RR: 18 (10-07-18 @ 04:16) (18 - 41)  SpO2: 95% (10-07-18 @ 04:16) (95% - 100%)  Wt(kg): --  I&O's Summary    06 Oct 2018 07:01  -  07 Oct 2018 07:00  --------------------------------------------------------  IN: 1077.5 mL / OUT: 575 mL / NET: 502.5 mL        JVP: Normal  Neck: supple  Lung: clear   CV: S1 S2 , Murmur:  Abd: soft  Ext: No edema  neuro: Awake / alert  Psych: flat affect  Skin: normal      LABS/DATA:    CARDIAC MARKERS:                                11.2   7.4   )-----------( 153      ( 06 Oct 2018 02:46 )             34.3     10-06    134<L>  |  104  |  9   ----------------------------<  128<H>  4.0   |  19<L>  |  0.60    Ca    8.4      06 Oct 2018 02:46  Phos  2.5     10-06  Mg     1.8     10-06      proBNP:   Lipid Profile:   HgA1c:   TSH:     TELE:  EKG:

## 2018-10-08 DIAGNOSIS — Z51.81 ENCOUNTER FOR THERAPEUTIC DRUG LEVEL MONITORING: ICD-10-CM

## 2018-10-08 LAB
-  AMPICILLIN/SULBACTAM: SIGNIFICANT CHANGE UP
-  AMPICILLIN/SULBACTAM: SIGNIFICANT CHANGE UP
-  CEFAZOLIN: SIGNIFICANT CHANGE UP
-  CEFAZOLIN: SIGNIFICANT CHANGE UP
-  CLINDAMYCIN: SIGNIFICANT CHANGE UP
-  CLINDAMYCIN: SIGNIFICANT CHANGE UP
-  ERYTHROMYCIN: SIGNIFICANT CHANGE UP
-  ERYTHROMYCIN: SIGNIFICANT CHANGE UP
-  GENTAMICIN: SIGNIFICANT CHANGE UP
-  GENTAMICIN: SIGNIFICANT CHANGE UP
-  OXACILLIN: SIGNIFICANT CHANGE UP
-  OXACILLIN: SIGNIFICANT CHANGE UP
-  PENICILLIN: SIGNIFICANT CHANGE UP
-  PENICILLIN: SIGNIFICANT CHANGE UP
-  RIFAMPIN: SIGNIFICANT CHANGE UP
-  RIFAMPIN: SIGNIFICANT CHANGE UP
-  TETRACYCLINE: SIGNIFICANT CHANGE UP
-  TETRACYCLINE: SIGNIFICANT CHANGE UP
-  TRIMETHOPRIM/SULFAMETHOXAZOLE: SIGNIFICANT CHANGE UP
-  TRIMETHOPRIM/SULFAMETHOXAZOLE: SIGNIFICANT CHANGE UP
-  VANCOMYCIN: SIGNIFICANT CHANGE UP
-  VANCOMYCIN: SIGNIFICANT CHANGE UP
CULTURE RESULTS: SIGNIFICANT CHANGE UP
CULTURE RESULTS: SIGNIFICANT CHANGE UP
METHOD TYPE: SIGNIFICANT CHANGE UP
METHOD TYPE: SIGNIFICANT CHANGE UP
SPECIMEN SOURCE: SIGNIFICANT CHANGE UP
SPECIMEN SOURCE: SIGNIFICANT CHANGE UP

## 2018-10-08 PROCEDURE — 99232 SBSQ HOSP IP/OBS MODERATE 35: CPT

## 2018-10-08 RX ADMIN — FAMOTIDINE 20 MILLIGRAM(S): 10 INJECTION INTRAVENOUS at 05:54

## 2018-10-08 RX ADMIN — ATORVASTATIN CALCIUM 10 MILLIGRAM(S): 80 TABLET, FILM COATED ORAL at 21:22

## 2018-10-08 RX ADMIN — Medication 100 MILLIGRAM(S): at 05:54

## 2018-10-08 RX ADMIN — Medication 15 MILLIGRAM(S): at 22:44

## 2018-10-08 RX ADMIN — Medication 100 MILLIGRAM(S): at 15:29

## 2018-10-08 RX ADMIN — Medication 20 MILLIGRAM(S): at 05:54

## 2018-10-08 RX ADMIN — FAMOTIDINE 20 MILLIGRAM(S): 10 INJECTION INTRAVENOUS at 17:42

## 2018-10-08 RX ADMIN — Medication 15 MILLIGRAM(S): at 21:22

## 2018-10-08 RX ADMIN — Medication 20 MILLIGRAM(S): at 15:29

## 2018-10-08 RX ADMIN — Medication 0.5 MILLIGRAM(S): at 23:01

## 2018-10-08 RX ADMIN — Medication 15 MILLIGRAM(S): at 16:00

## 2018-10-08 RX ADMIN — Medication 325 MILLIGRAM(S): at 05:54

## 2018-10-08 RX ADMIN — Medication 325 MILLIGRAM(S): at 17:42

## 2018-10-08 RX ADMIN — Medication 100 MILLIGRAM(S): at 21:49

## 2018-10-08 RX ADMIN — Medication 15 MILLIGRAM(S): at 15:29

## 2018-10-08 RX ADMIN — Medication 20 MILLIGRAM(S): at 17:42

## 2018-10-08 NOTE — PROGRESS NOTE ADULT - SUBJECTIVE AND OBJECTIVE BOX
Patient is a 78y old  Female who presents with a chief complaint of L TKA acute PJI (08 Oct 2018 17:02)        MEDICATIONS  (STANDING):  acetaminophen   Tablet .. 975 milliGRAM(s) Oral every 8 hours  aspirin 325 milliGRAM(s) Oral two times a day  atorvastatin 10 milliGRAM(s) Oral at bedtime  ceFAZolin   IVPB 2000 milliGRAM(s) IV Intermittent every 8 hours  docusate sodium 100 milliGRAM(s) Oral three times a day  famotidine    Tablet 20 milliGRAM(s) Oral every 12 hours  furosemide    Tablet 20 milliGRAM(s) Oral two times a day  ketorolac   Injectable 15 milliGRAM(s) IV Push every 8 hours  PARoxetine 20 milliGRAM(s) Oral daily  rifampin 300 milliGRAM(s) Oral every 12 hours  senna 2 Tablet(s) Oral at bedtime    MEDICATIONS  (PRN):  ALPRAZolam 0.5 milliGRAM(s) Oral two times a day PRN anxiety  magnesium hydroxide Suspension 30 milliLiter(s) Oral daily PRN Constipation  oxyCODONE    IR 5 milliGRAM(s) Oral every 4 hours PRN Moderate Pain (4 - 6)  oxyCODONE    IR 10 milliGRAM(s) Oral every 4 hours PRN Severe Pain (7 - 10)          VITALS:   T(C): 36.9 (10-08-18 @ 23:22), Max: 36.9 (10-08-18 @ 22:03)  HR: 84 (10-08-18 @ 23:22) (80 - 92)  BP: 147/64 (10-08-18 @ 23:22) (136/64 - 158/66)  RR: 18 (10-08-18 @ 23:22) (17 - 18)  SpO2: 97% (10-08-18 @ 23:22) (96% - 98%)  Wt(kg): --        LABS:        CBC Full  -  ( 07 Oct 2018 10:17 )  WBC Count : 6.12 K/uL  Hemoglobin : 10.6 g/dL  Hematocrit : 32.1 %  Platelet Count - Automated : 196 K/uL  Mean Cell Volume : 92.2 fl  Mean Cell Hemoglobin : 30.5 pg  Mean Cell Hemoglobin Concentration : 33.0 gm/dL  Auto Neutrophil # : x  Auto Lymphocyte # : x  Auto Monocyte # : x  Auto Eosinophil # : x  Auto Basophil # : x  Auto Neutrophil % : x  Auto Lymphocyte % : x  Auto Monocyte % : x  Auto Eosinophil % : x  Auto Basophil % : x    10-07    133<L>  |  100  |  11  ----------------------------<  134<H>  3.8   |  23  |  0.60    Ca    8.2<L>      07 Oct 2018 08:51  Phos  2.5     10-07  Mg     2.1     10-07            CAPILLARY BLOOD GLUCOSE          RADIOLOGY & ADDITIONAL TESTS: 79 yo female PMhx anxiety and b/l total knee replacements (left knee done 2007) c/o fever and L knee pain. Patient seen at [facility] ED last night for left knee pain and fever's, was diagnose with infected L knee joint after arthrocentesis and was advised to stay for admission but left AMA after advising ED staff she would go to [facility]. Went home in AM and continued to having fevers at home with increasing pain in L knee so came back. Fever tmax ~104 at home rectally. No dizziness, no chest pain, no n/v, no weakness, no numbness/tingling. Patient seen now resting comfortably.  Had  washout of left knee. Patient had a hypotensive episode that responded to bolus of fluid.  Seen on ortho floor Resting comfortably now no chest pain or sob or abdominal pain.  Stressed incentive spirometry to patient    MEDICATIONS  (STANDING):  acetaminophen   Tablet .. 975 milliGRAM(s) Oral every 8 hours  aspirin 325 milliGRAM(s) Oral two times a day  atorvastatin 10 milliGRAM(s) Oral at bedtime  ceFAZolin   IVPB 2000 milliGRAM(s) IV Intermittent every 8 hours  docusate sodium 100 milliGRAM(s) Oral three times a day  famotidine    Tablet 20 milliGRAM(s) Oral every 12 hours  furosemide    Tablet 20 milliGRAM(s) Oral two times a day  ketorolac   Injectable 15 milliGRAM(s) IV Push every 8 hours  PARoxetine 20 milliGRAM(s) Oral daily  rifampin 300 milliGRAM(s) Oral every 12 hours  senna 2 Tablet(s) Oral at bedtime    MEDICATIONS  (PRN):  ALPRAZolam 0.5 milliGRAM(s) Oral two times a day PRN anxiety  magnesium hydroxide Suspension 30 milliLiter(s) Oral daily PRN Constipation  oxyCODONE    IR 5 milliGRAM(s) Oral every 4 hours PRN Moderate Pain (4 - 6)  oxyCODONE    IR 10 milliGRAM(s) Oral every 4 hours PRN Severe Pain (7 - 10)          VITALS:   T(C): 36.9 (10-08-18 @ 23:22), Max: 36.9 (10-08-18 @ 22:03)  HR: 84 (10-08-18 @ 23:22) (80 - 92)  BP: 147/64 (10-08-18 @ 23:22) (136/64 - 158/66)  RR: 18 (10-08-18 @ 23:22) (17 - 18)  SpO2: 97% (10-08-18 @ 23:22) (96% - 98%)  Wt(kg): --    PHYSICAL EXAM:  GENERAL: NAD, well-groomed, well-developed  HEAD:  Atraumatic, Normocephalic  EYES: EOMI, PERRLA, conjunctiva and sclera clear  ENMT: No tonsillar erythema, exudates, or enlargement; Moist mucous membranes, Good dentition, No lesions  NECK: Supple, No JVD, Normal thyroid  NERVOUS SYSTEM:  Alert & Oriented X3, Good concentration; Motor Strength 5/5 B/L upper and lower extremities; DTRs 2+ intact and symmetric  CHEST/LUNG: Clear to percussion bilaterally; No rales, rhonchi, wheezing, or rubs  HEART: Regular rate and rhythm; No murmurs, rubs, or gallops  ABDOMEN: Soft, Nontender, Nondistended; Bowel sounds present  EXTREMITIES: Bruising and swelling of the left LE  sepsis from left knee periprosthetic infection. S/p left knee washout and polyethylene liner exchange on 10/5.  LYMPH: No lymphadenopathy noted  SKIN: No rashes or lesions      LABS:        CBC Full  -  ( 07 Oct 2018 10:17 )  WBC Count : 6.12 K/uL  Hemoglobin : 10.6 g/dL  Hematocrit : 32.1 %  Platelet Count - Automated : 196 K/uL  Mean Cell Volume : 92.2 fl  Mean Cell Hemoglobin : 30.5 pg  Mean Cell Hemoglobin Concentration : 33.0 gm/dL  Auto Neutrophil # : x  Auto Lymphocyte # : x  Auto Monocyte # : x  Auto Eosinophil # : x  Auto Basophil # : x  Auto Neutrophil % : x  Auto Lymphocyte % : x  Auto Monocyte % : x  Auto Eosinophil % : x  Auto Basophil % : x    10-07    133<L>  |  100  |  11  ----------------------------<  134<H>  3.8   |  23  |  0.60    Ca    8.2<L>      07 Oct 2018 08:51  Phos  2.5     10-07  Mg     2.1     10-07            CAPILLARY BLOOD GLUCOSE          RADIOLOGY & ADDITIONAL TESTS:

## 2018-10-08 NOTE — PROGRESS NOTE ADULT - ATTENDING COMMENTS
Umberto Hendrix  Attending Physician   Division of Infectious Disease  Pager #[phone]  After 5pm/weekend or no response, call #[phone]

## 2018-10-08 NOTE — PROGRESS NOTE ADULT - SUBJECTIVE AND OBJECTIVE BOX
Subjective: Patient seen and examined. No new events except as noted.     SUBJECTIVE/ROS:  nad       MEDICATIONS:  MEDICATIONS  (STANDING):  acetaminophen   Tablet .. 975 milliGRAM(s) Oral every 8 hours  aspirin 325 milliGRAM(s) Oral two times a day  atorvastatin 10 milliGRAM(s) Oral at bedtime  ceFAZolin   IVPB 2000 milliGRAM(s) IV Intermittent every 8 hours  docusate sodium 100 milliGRAM(s) Oral three times a day  famotidine    Tablet 20 milliGRAM(s) Oral every 12 hours  furosemide    Tablet 20 milliGRAM(s) Oral two times a day  ketorolac   Injectable 15 milliGRAM(s) IV Push every 8 hours  PARoxetine 20 milliGRAM(s) Oral daily  senna 2 Tablet(s) Oral at bedtime      PHYSICAL EXAM:  T(C): 37.1 (10-07-18 @ 21:34), Max: 37.1 (10-07-18 @ 21:34)  HR: 95 (10-07-18 @ 21:34) (77 - 95)  BP: 152/75 (10-07-18 @ 21:34) (128/74 - 152/75)  RR: 17 (10-07-18 @ 21:34) (16 - 17)  SpO2: 94% (10-07-18 @ 21:34) (94% - 96%)  Wt(kg): --  I&O's Summary    07 Oct 2018 07:01  -  08 Oct 2018 07:00  --------------------------------------------------------  IN: 1500 mL / OUT: 650 mL / NET: 850 mL        JVP: Normal  Neck: supple  Lung: clear   CV: S1 S2 , Murmur:  Abd: soft  Ext: No edema  neuro: Awake / alert  Psych: flat affect  Skin: normal    LABS/DATA:    CARDIAC MARKERS:                                10.6   6.12  )-----------( 196      ( 07 Oct 2018 10:17 )             32.1     10-07    133<L>  |  100  |  11  ----------------------------<  134<H>  3.8   |  23  |  0.60    Ca    8.2<L>      07 Oct 2018 08:51  Phos  2.5     10-07  Mg     2.1     10-07      proBNP:   Lipid Profile:   HgA1c:   TSH:     TELE:  EKG:

## 2018-10-08 NOTE — OCCUPATIONAL THERAPY INITIAL EVALUATION ADULT - ADDITIONAL COMMENTS
X-ray of L knee on 10/6:Two views of the left knee deficits a left total knee arthroplasty. There is fluid and gas with a drain in the knee joint space in keeping with recent surgery. IMPRESSION: Status post knee arthroplasty.

## 2018-10-08 NOTE — PROGRESS NOTE ADULT - ASSESSMENT
hypotension resolved  likely due to sirs from bacteremia   BP is going up , will consider treatment if persistently elevated     septic joint  s/p  wash out   anbx  ID consult noted   follow up culture   no veg on echo     HTN  had episode of hypotension  now resolved  cont to monitor and treat as needed

## 2018-10-08 NOTE — PROGRESS NOTE ADULT - ASSESSMENT
S/PI&D and poly exchange LT TKR      Plan    f/u cx's  con't ancef  OOB/PT   ck labs  D/c incisional vac today and cover with aquacel.  Will monitor skin irritation for now         Kulwinder Means III, D.O.  PGY V Orthopedic Resident  # [id number]

## 2018-10-08 NOTE — OCCUPATIONAL THERAPY INITIAL EVALUATION ADULT - PERTINENT HX OF CURRENT PROBLEM, REHAB EVAL
78y Female w/ hx of Sjogren's syndrome, HTN, HLD, BL TKA (2008) and depression presented to ED c/o L knee pain for 4 days. Pt states that pain is worse with walking but can still bear weight. Pt denies radiation of pain. Pt denies numbness, tingling, or burning in the LLE. Fell 2 weeks ago but was still ambulating. +fever/chills. (see below)

## 2018-10-08 NOTE — OCCUPATIONAL THERAPY INITIAL EVALUATION ADULT - BALANCE TRAINING, PT EVAL
GOAL: Pt will increase s/d standing tolerance to facilitate performance in ADLs and functional mobility tasks within 2 weeks.

## 2018-10-08 NOTE — PROGRESS NOTE ADULT - ATTENDING COMMENTS
I agree with the above note and have personally seen and examined this patient. All pertinent films have been reviewed. Please refer to clinical documentation of the history, physical examinations, data summary, and both assessment and plan as documented above and with which I agree.    pain controlled  ambulating well on own now    afvss  nad  LLE  wound cdi, aqucel placed  5/5 ta/ehl/gcs  silt L4-S1  2+ dp    doing well in SICU s/p L TKA I&D and PE exchange  asa 325bid x 30 days, venodynes for dvt ppx  ancef 2gq8 iv and rifampin 300mg bid po per ID for MSSA, discussed on phone w/Dr. Hendrix  wbat  bcx ng so place PICC tomorrow    Bunny Montoya MD  Attending Orthopedic Surgeon .

## 2018-10-08 NOTE — OCCUPATIONAL THERAPY INITIAL EVALUATION ADULT - MD ORDER
OT evaluate and treat   Activity out of bed to chair OT evaluate and treat   Activity out of bed to chair with WBAT to BL LEs

## 2018-10-08 NOTE — PROGRESS NOTE ADULT - SUBJECTIVE AND OBJECTIVE BOX
CASEY TIJERINA 78y [id number]    Patient is a 78y old  Female who presents with a chief complaint of L TKA acute PJI (08 Oct 2018 08:28)      Follow Up/CC:  ID following for bacteremia    Interval History/ROS: no acute issues    Allergies    No Known Allergies    Intolerances        ANTIMICROBIALS:  ceFAZolin   IVPB 2000 every 8 hours      MEDICATIONS  (STANDING):  acetaminophen   Tablet .. 975 milliGRAM(s) Oral every 8 hours  aspirin 325 milliGRAM(s) Oral two times a day  atorvastatin 10 milliGRAM(s) Oral at bedtime  ceFAZolin   IVPB 2000 milliGRAM(s) IV Intermittent every 8 hours  docusate sodium 100 milliGRAM(s) Oral three times a day  famotidine    Tablet 20 milliGRAM(s) Oral every 12 hours  furosemide    Tablet 20 milliGRAM(s) Oral two times a day  ketorolac   Injectable 15 milliGRAM(s) IV Push every 8 hours  PARoxetine 20 milliGRAM(s) Oral daily  senna 2 Tablet(s) Oral at bedtime    MEDICATIONS  (PRN):  ALPRAZolam 0.5 milliGRAM(s) Oral two times a day PRN anxiety  magnesium hydroxide Suspension 30 milliLiter(s) Oral daily PRN Constipation  oxyCODONE    IR 5 milliGRAM(s) Oral every 4 hours PRN Moderate Pain (4 - 6)  oxyCODONE    IR 10 milliGRAM(s) Oral every 4 hours PRN Severe Pain (7 - 10)        Vital Signs Last 24 Hrs  T(C): 36.8 (08 Oct 2018 14:15), Max: 37.1 (07 Oct 2018 21:34)  T(F): 98.2 (08 Oct 2018 14:15), Max: 98.7 (07 Oct 2018 21:34)  HR: 82 (08 Oct 2018 14:15) (80 - 95)  BP: 147/69 (08 Oct 2018 14:15) (136/68 - 158/66)  BP(mean): --  RR: 18 (08 Oct 2018 14:15) (17 - 18)  SpO2: 97% (08 Oct 2018 14:15) (94% - 98%)    CBC Full  -  ( 07 Oct 2018 10:17 )  WBC Count : 6.12 K/uL  Hemoglobin : 10.6 g/dL  Hematocrit : 32.1 %  Platelet Count - Automated : 196 K/uL  Mean Cell Volume : 92.2 fl  Mean Cell Hemoglobin : 30.5 pg  Mean Cell Hemoglobin Concentration : 33.0 gm/dL  Auto Neutrophil # : x  Auto Lymphocyte # : x  Auto Monocyte # : x  Auto Eosinophil # : x  Auto Basophil # : x  Auto Neutrophil % : x  Auto Lymphocyte % : x  Auto Monocyte % : x  Auto Eosinophil % : x  Auto Basophil % : x    10-07    133<L>  |  100  |  11  ----------------------------<  134<H>  3.8   |  23  |  0.60    Ca    8.2<L>      07 Oct 2018 08:51  Phos  2.5     10-07  Mg     2.1     10-07            MICROBIOLOGY:  .Blood Blood-Peripheral  10-07-18   No growth to date.  --  --      .Body Fluid Synovial Fluid  10-06-18   Numerous Staphylococcus aureus  --  Staphylococcus aureus      .Blood Blood  10-06-18   Growth in aerobic and anaerobic bottles: Staphylococcus aureus  See previous culture [id number]  --    Growth in aerobic bottle: Gram Positive Cocci in Clusters  Growth in anaerobic bottle: Gram Positive Cocci in Clusters      .Blood Blood  10-06-18   Growth in aerobic and anaerobic bottles: Staphylococcus aureus  See previous culture [id number]  --    Growth in aerobic bottle: Gram Positive Cocci in Clusters  Growth in anaerobic bottle: Gram Positive Cocci in Clusters      .Blood Blood-Peripheral  10-04-18   Growth in aerobic and anaerobic bottles: Staphylococcus aureus  See previous culture [id number]  --    Growth in aerobic bottle: Gram Positive Cocci in Clusters  Growth in anaerobic bottle: Gram Positive Cocci in Clusters      .Urine Clean Catch (Midstream)  10-04-18   No growth  --  --      .Body Fluid Knee Fluid  10-04-18   Moderate Staphylococcus aureus  --  Staphylococcus aureus      .Blood Blood-Peripheral  10-03-18   Growth in aerobic and anaerobic bottles: Staphylococcus aureus  See previous culture [id number]  --  Blood Culture PCR  Staphylococcus aureus    RADIOLOGY  < from: Xray Chest 1 View- PORTABLE-Routine (10.06.18 @ 06:59) >  The heart is normal in size. The lungs are clear. Right thoracic   scoliosis. No change in appearance the chest when compared to previous   study done October 5, 2018.        Transthoracic Echocardiogram (10.05.18 @ 14:28) >  1. Mitral annular calcification, otherwise normal mitral  valve. Minimal mitral regurgitation.  2. Calcified aortic valve. Peak transaortic valve gradient  equals 13 mm Hg, mean transaortic valve gradient equals 8  mm Hg, aorticvalve velocity time integral equals 35 cm,  estimated aortic valve area equals 2.7 sqcm. Minimal aortic  regurgitation.  3. Proximal septal thickening, otherwise normal left  ventricular internal dimensions and wall thicknesses. The  proximal septum measures 1.4cm.  4. Hyperdynamic left ventricular systolic function. No  segmental wall motion abnormalities.  5. Normal diastolic function  6. Normal right ventricular size and function.

## 2018-10-08 NOTE — OCCUPATIONAL THERAPY INITIAL EVALUATION ADULT - PRECAUTIONS/LIMITATIONS, REHAB EVAL
(continued from above) Pt is community ambulator without an assistive device. Was seen in ED early this AM and knee was aspirated. Growing GPC in both knee aspirate and blood. Patient left AMA initially to follow up at S, but came back this afternoon with fevers again. Denies other recent illnesses. fall precautions/(continued from above) Pt is community ambulator without an assistive device. Was seen in ED early this AM and knee was aspirated. Growing GPC in both knee aspirate and blood. Patient left AMA initially to follow up at [facility], but came back this afternoon with fevers again. Denies other recent illnesses.

## 2018-10-08 NOTE — PROGRESS NOTE ADULT - SUBJECTIVE AND OBJECTIVE BOX
Patient seen and examined. Patient found yesterday with a bag full of pills which was removed. This AM c/o of slight dry mouth, nurse reports slight increased redness of skin, however no change in her medications.       POST OPERATIVE DAY #: 3    Vital Signs Last 24 Hrs  T(C): 37.1 (07 Oct 2018 21:34), Max: 37.1 (07 Oct 2018 21:34)  T(F): 98.7 (07 Oct 2018 21:34), Max: 98.7 (07 Oct 2018 21:34)  HR: 95 (07 Oct 2018 21:34) (77 - 95)  BP: 152/75 (07 Oct 2018 21:34) (128/74 - 152/75)  BP(mean): --  RR: 17 (07 Oct 2018 21:34) (16 - 17)  SpO2: 94% (07 Oct 2018 21:34) (94% - 96%)    PHYSICAL EXAM:  NAD, Alert  EXT:  Lt Knee: Dressing C/D/I;   Drain intact 10cc over last shift  (+) DF/PF;  EHL FHL:  No gross Sensation deficits noted   (+) Distal Pulses;   B/L, PAS

## 2018-10-08 NOTE — OCCUPATIONAL THERAPY INITIAL EVALUATION ADULT - DIAGNOSIS, OT EVAL
Pt p/w Pt p/w pain, decreased strength and balance impacting her ability to perform ADLs and functional mobility tasks.

## 2018-10-09 PROCEDURE — 71045 X-RAY EXAM CHEST 1 VIEW: CPT | Mod: 26

## 2018-10-09 PROCEDURE — 99232 SBSQ HOSP IP/OBS MODERATE 35: CPT

## 2018-10-09 RX ORDER — CEFAZOLIN SODIUM 1 G
2 VIAL (EA) INJECTION
Qty: 2 | Refills: 0
Start: 2018-10-09 | End: 2018-11-15

## 2018-10-09 RX ORDER — AMLODIPINE BESYLATE 2.5 MG/1
5 TABLET ORAL DAILY
Qty: 0 | Refills: 0 | Status: DISCONTINUED | OUTPATIENT
Start: 2018-10-09 | End: 2018-10-10

## 2018-10-09 RX ADMIN — Medication 20 MILLIGRAM(S): at 05:55

## 2018-10-09 RX ADMIN — Medication 15 MILLIGRAM(S): at 21:46

## 2018-10-09 RX ADMIN — OXYCODONE HYDROCHLORIDE 5 MILLIGRAM(S): 5 TABLET ORAL at 01:08

## 2018-10-09 RX ADMIN — Medication 100 MILLIGRAM(S): at 13:13

## 2018-10-09 RX ADMIN — Medication 20 MILLIGRAM(S): at 12:13

## 2018-10-09 RX ADMIN — Medication 975 MILLIGRAM(S): at 17:32

## 2018-10-09 RX ADMIN — Medication 15 MILLIGRAM(S): at 05:55

## 2018-10-09 RX ADMIN — Medication 0.5 MILLIGRAM(S): at 21:51

## 2018-10-09 RX ADMIN — Medication 975 MILLIGRAM(S): at 01:06

## 2018-10-09 RX ADMIN — Medication 975 MILLIGRAM(S): at 02:06

## 2018-10-09 RX ADMIN — Medication 100 MILLIGRAM(S): at 21:51

## 2018-10-09 RX ADMIN — ATORVASTATIN CALCIUM 10 MILLIGRAM(S): 80 TABLET, FILM COATED ORAL at 21:46

## 2018-10-09 RX ADMIN — FAMOTIDINE 20 MILLIGRAM(S): 10 INJECTION INTRAVENOUS at 17:32

## 2018-10-09 RX ADMIN — Medication 325 MILLIGRAM(S): at 05:54

## 2018-10-09 RX ADMIN — Medication 15 MILLIGRAM(S): at 06:20

## 2018-10-09 RX ADMIN — Medication 975 MILLIGRAM(S): at 18:00

## 2018-10-09 RX ADMIN — OXYCODONE HYDROCHLORIDE 5 MILLIGRAM(S): 5 TABLET ORAL at 02:08

## 2018-10-09 RX ADMIN — Medication 100 MILLIGRAM(S): at 21:47

## 2018-10-09 RX ADMIN — FAMOTIDINE 20 MILLIGRAM(S): 10 INJECTION INTRAVENOUS at 05:55

## 2018-10-09 RX ADMIN — Medication 0.5 MILLIGRAM(S): at 06:00

## 2018-10-09 RX ADMIN — Medication 15 MILLIGRAM(S): at 13:13

## 2018-10-09 RX ADMIN — Medication 975 MILLIGRAM(S): at 09:10

## 2018-10-09 RX ADMIN — Medication 100 MILLIGRAM(S): at 05:55

## 2018-10-09 RX ADMIN — Medication 15 MILLIGRAM(S): at 13:30

## 2018-10-09 RX ADMIN — Medication 15 MILLIGRAM(S): at 22:46

## 2018-10-09 RX ADMIN — Medication 325 MILLIGRAM(S): at 17:31

## 2018-10-09 RX ADMIN — Medication 20 MILLIGRAM(S): at 17:31

## 2018-10-09 RX ADMIN — SENNA PLUS 2 TABLET(S): 8.6 TABLET ORAL at 21:48

## 2018-10-09 RX ADMIN — AMLODIPINE BESYLATE 5 MILLIGRAM(S): 2.5 TABLET ORAL at 12:13

## 2018-10-09 RX ADMIN — Medication 975 MILLIGRAM(S): at 09:42

## 2018-10-09 RX ADMIN — Medication 100 MILLIGRAM(S): at 05:56

## 2018-10-09 NOTE — PROGRESS NOTE ADULT - ASSESSMENT
77 yo woman with a hx of a fall two weeks ago present with pain and swelling of the left knee found to have an infection of the left knee  periprosthetic infection. S/p left knee washout and polyethylene liner exchange on 10/5. IN ICU for close post op care for cardiopulmonary issues.  Patient currently stable and ready for transfer to regular floor . 79 yo woman with a hx of a fall two weeks ago present with pain and swelling of the left knee found to have an infection of the left knee  periprosthetic infection. S/p left knee washout and polyethylene liner exchange on 10/5.  Patient seen now resting comfortably.  Had  washout of left knee. Patient had a hypotensive episode that responded to bolus of fluid.  Seen on ortho floor Resting comfortably now no chest pain or sob or abdominal pain.  Stressed incentive spirometry to patient. PICC line placed for prolonged antibiotics.

## 2018-10-09 NOTE — PROGRESS NOTE ADULT - SUBJECTIVE AND OBJECTIVE BOX
Patient seen and examined. Doing much better today. Plans for PICC line today.       Vital Signs Last 24 Hrs  T(C): 36.5 (09 Oct 2018 05:36), Max: 36.9 (08 Oct 2018 22:03)  T(F): 97.7 (09 Oct 2018 05:36), Max: 98.5 (08 Oct 2018 22:03)  HR: 80 (09 Oct 2018 05:36) (80 - 92)  BP: 140/66 (09 Oct 2018 05:36) (136/64 - 158/66)  BP(mean): --  RR: 18 (09 Oct 2018 05:36) (17 - 18)  SpO2: 97% (09 Oct 2018 05:36) (96% - 98%)    PHYSICAL EXAM:  NAD, Alert  EXT:  Lt Knee: Dressing C/D/I;   Drain DC'd  (+) DF/PF;  EHL FHL:  No gross Sensation deficits noted   (+) Distal Pulses;   B/L, PAS

## 2018-10-09 NOTE — PROGRESS NOTE ADULT - SUBJECTIVE AND OBJECTIVE BOX
Subjective: Patient seen and examined. No new events except as noted.     SUBJECTIVE/ROS:  resting       MEDICATIONS:  MEDICATIONS  (STANDING):  acetaminophen   Tablet .. 975 milliGRAM(s) Oral every 8 hours  aspirin 325 milliGRAM(s) Oral two times a day  atorvastatin 10 milliGRAM(s) Oral at bedtime  ceFAZolin   IVPB 2000 milliGRAM(s) IV Intermittent every 8 hours  docusate sodium 100 milliGRAM(s) Oral three times a day  famotidine    Tablet 20 milliGRAM(s) Oral every 12 hours  furosemide    Tablet 20 milliGRAM(s) Oral two times a day  ketorolac   Injectable 15 milliGRAM(s) IV Push every 8 hours  PARoxetine 20 milliGRAM(s) Oral daily  rifampin 300 milliGRAM(s) Oral every 12 hours  senna 2 Tablet(s) Oral at bedtime      PHYSICAL EXAM:  T(C): 36.5 (10-09-18 @ 05:36), Max: 36.9 (10-08-18 @ 22:03)  HR: 80 (10-09-18 @ 05:36) (80 - 92)  BP: 140/66 (10-09-18 @ 05:36) (136/64 - 158/66)  RR: 18 (10-09-18 @ 05:36) (17 - 18)  SpO2: 97% (10-09-18 @ 05:36) (96% - 98%)  Wt(kg): --  I&O's Summary    08 Oct 2018 07:01  -  09 Oct 2018 07:00  --------------------------------------------------------  IN: 1430 mL / OUT: 0 mL / NET: 1430 mL          Appearance: Normal	  HEENT:   no gross abnormality   Cardiovascular: Normal S1 S2,    Murmur:   Neck: JVP normal  Respiratory: Lungs clear   Gastrointestinal:  Soft, Non-tender  Skin: normal   Neuro: No gross deficits.   Psychiatry:  Mood & affect flat  Ext: No edema      LABS/DATA:    CARDIAC MARKERS:                                10.6   6.12  )-----------( 196      ( 07 Oct 2018 10:17 )             32.1     10-07    133<L>  |  100  |  11  ----------------------------<  134<H>  3.8   |  23  |  0.60    Ca    8.2<L>      07 Oct 2018 08:51  Phos  2.5     10-07  Mg     2.1     10-07      proBNP:   Lipid Profile:   HgA1c:   TSH:     TELE:  EKG:

## 2018-10-09 NOTE — PROGRESS NOTE ADULT - ATTENDING COMMENTS
I agree with the above note and have personally seen and examined this patient. All pertinent films have been reviewed. Please refer to clinical documentation of the history, physical examinations, data summary, and both assessment and plan as documented above and with which I agree.    pain controlled  ambulating well on own now    afvss  nad  LLE  wound cdi, aqucel placed  5/5 ta/ehl/gcs  silt L4-S1  2+ dp    doing well in SICU s/p L TKA I&D and PE exchange  asa 325bid x 30 days, venodynes for dvt ppx  ancef 2gq8 iv and rifampin 300mg bid po per ID for MSSA, discussed on phone w/Dr. Hendrix  wbat  bcx ng so place PICC tomorrow    Bunny Montoya MD  Attending Orthopedic Surgeon . I agree with the above note and have personally seen and examined this patient. All pertinent films have been reviewed. Please refer to clinical documentation of the history, physical examinations, data summary, and both assessment and plan as documented above and with which I agree.    pain controlled  ambulating well    afvss  nad  LLE  aqucel cdi  5/5 ta/ehl/gcs  silt L4-S1  2+ dp    doing well s/p L TKA I&D and PE exchange  asa 325bid x 30 days, venodynes for dvt ppx  ancef 2gq8 iv and rifampin 300mg bid po per ID for MSSA, pending final ID recs for dc  wbat  PICC placement pending today  dispo planning for discharge home with home pt/nursing likely tomorrow vs Thursday    Bunny Montoya MD  Attending Orthopedic Surgeon

## 2018-10-09 NOTE — PROGRESS NOTE ADULT - ASSESSMENT
hypotension resolved  HTN  add norvasc    septic joint  s/p  wash out   anbx  ID consult noted   follow up culture   no veg on echo

## 2018-10-09 NOTE — PROGRESS NOTE ADULT - SUBJECTIVE AND OBJECTIVE BOX
CASEY TIJERINA 78y [id number]    Patient is a 78y old  Female who presents with a chief complaint of L TKA acute PJI (09 Oct 2018 07:58)      Follow Up/CC:  ID following for bacteremia    Interval History/ROS: feels well    Allergies    No Known Allergies    Intolerances        ANTIMICROBIALS:  ceFAZolin   IVPB 2000 every 8 hours  rifampin 300 every 12 hours      MEDICATIONS  (STANDING):  acetaminophen   Tablet .. 975 milliGRAM(s) Oral every 8 hours  amLODIPine   Tablet 5 milliGRAM(s) Oral daily  aspirin 325 milliGRAM(s) Oral two times a day  atorvastatin 10 milliGRAM(s) Oral at bedtime  ceFAZolin   IVPB 2000 milliGRAM(s) IV Intermittent every 8 hours  docusate sodium 100 milliGRAM(s) Oral three times a day  famotidine    Tablet 20 milliGRAM(s) Oral every 12 hours  furosemide    Tablet 20 milliGRAM(s) Oral two times a day  ketorolac   Injectable 15 milliGRAM(s) IV Push every 8 hours  PARoxetine 20 milliGRAM(s) Oral daily  rifampin 300 milliGRAM(s) Oral every 12 hours  senna 2 Tablet(s) Oral at bedtime    MEDICATIONS  (PRN):  ALPRAZolam 0.5 milliGRAM(s) Oral two times a day PRN anxiety  HYDROmorphone  Injectable 0.25 milliGRAM(s) IV Push every 10 minutes PRN Moderate Pain (4 - 6)  magnesium hydroxide Suspension 30 milliLiter(s) Oral daily PRN Constipation  ondansetron Injectable 4 milliGRAM(s) IV Push once PRN Nausea and/or Vomiting  oxyCODONE    IR 5 milliGRAM(s) Oral every 4 hours PRN Moderate Pain (4 - 6)  oxyCODONE    IR 10 milliGRAM(s) Oral every 4 hours PRN Severe Pain (7 - 10)        Vital Signs Last 24 Hrs  T(C): 36.7 (09 Oct 2018 09:42), Max: 36.9 (08 Oct 2018 22:03)  T(F): 98.1 (09 Oct 2018 09:42), Max: 98.5 (08 Oct 2018 22:03)  HR: 85 (09 Oct 2018 09:42) (80 - 92)  BP: 140/65 (09 Oct 2018 09:42) (136/64 - 147/69)  BP(mean): --  RR: 18 (09 Oct 2018 09:42) (18 - 18)  SpO2: 96% (09 Oct 2018 09:42) (96% - 97%)                  MICROBIOLOGY:  .Blood Blood  10-08-18   No growth to date.  --  --      .Blood Blood-Peripheral  10-07-18   No growth to date.  --  --      .Body Fluid Synovial Fluid  10-06-18   Numerous Staphylococcus aureus  --  Staphylococcus aureus      .Blood Blood  10-06-18   Growth in aerobic and anaerobic bottles: Staphylococcus aureus  See previous culture [id number]  --    Growth in aerobic bottle: Gram Positive Cocci in Clusters  Growth in anaerobic bottle: Gram Positive Cocci in Clusters      .Blood Blood  10-06-18   Growth in aerobic and anaerobic bottles: Staphylococcus aureus  See previous culture [id number]  --    Growth in aerobic bottle: Gram Positive Cocci in Clusters  Growth in anaerobic bottle: Gram Positive Cocci in Clusters      .Blood Blood-Peripheral  10-04-18   Growth in aerobic and anaerobic bottles: Staphylococcus aureus  See previous culture [id number]  --    Growth in aerobic bottle: Gram Positive Cocci in Clusters  Growth in anaerobic bottle: Gram Positive Cocci in Clusters      .Urine Clean Catch (Midstream)  10-04-18   No growth  --  --      .Body Fluid Knee Fluid  10-04-18   Moderate Staphylococcus aureus  --  Staphylococcus aureus      .Blood Blood-Peripheral  10-03-18   Growth in aerobic and anaerobic bottles: Staphylococcus aureus  See previous culture [id number]  --  Blood Culture PCR  Staphylococcus aureus      RADIOLOGY    Xray Chest 1 View- PORTABLE-Routine (10.06.18 @ 06:59) >  The heart is normal in size. The lungs are clear. Right thoracic   scoliosis. No change in appearance the chest when compared to previous   study done October 5, 2018.

## 2018-10-09 NOTE — PROVIDER CONTACT NOTE (OTHER) - ACTION/TREATMENT ORDERED:
Ez Chandler made aware, Medications confiscated @ this time, RN will cont to sarah pt. bed alarm remain on.

## 2018-10-09 NOTE — PROVIDER CONTACT NOTE (OTHER) - BACKGROUND
Irrigation and debridement of knee with replacement of polyethylene liner  10/06/2018  Irrigation and debridement of L knee with poly exchange.

## 2018-10-09 NOTE — PROGRESS NOTE ADULT - ATTENDING COMMENTS
Umberto Hendrix  Attending Physician   Division of Infectious Disease  Pager #[phone]  After 5pm/weekend or no response, call #[phone]

## 2018-10-09 NOTE — PROGRESS NOTE ADULT - SUBJECTIVE AND OBJECTIVE BOX
77 yo female PMhx anxiety and b/l total knee replacements (left knee done 2007) c/o fever and L knee pain. Patient seen at [facility] ED last night for left knee pain and fever's, was diagnose with infected L knee joint after arthrocentesis and was advised to stay for admission but left AMA after advising ED staff she would go to [facility]. Went home in AM and continued to having fevers at home with increasing pain in L knee so came back. Fever tmax ~104 at home rectally. No dizziness, no chest pain, no n/v, no weakness, no numbness/tingling. Patient seen now resting comfortably.  Had  washout of left knee. Patient had a hypotensive episode that responded to bolus of fluid.  Seen on ortho floor Resting comfortably now no chest pain or sob or abdominal pain.  Stressed incentive spirometry to patient    MEDICATIONS  (STANDING):  acetaminophen   Tablet .. 975 milliGRAM(s) Oral every 8 hours  amLODIPine   Tablet 5 milliGRAM(s) Oral daily  aspirin 325 milliGRAM(s) Oral two times a day  atorvastatin 10 milliGRAM(s) Oral at bedtime  ceFAZolin   IVPB 2000 milliGRAM(s) IV Intermittent every 8 hours  docusate sodium 100 milliGRAM(s) Oral three times a day  famotidine    Tablet 20 milliGRAM(s) Oral every 12 hours  furosemide    Tablet 20 milliGRAM(s) Oral two times a day  ketorolac   Injectable 15 milliGRAM(s) IV Push every 8 hours  PARoxetine 20 milliGRAM(s) Oral daily  rifampin 300 milliGRAM(s) Oral every 12 hours  senna 2 Tablet(s) Oral at bedtime    MEDICATIONS  (PRN):  ALPRAZolam 0.5 milliGRAM(s) Oral two times a day PRN anxiety  HYDROmorphone  Injectable 0.25 milliGRAM(s) IV Push every 10 minutes PRN Moderate Pain (4 - 6)  magnesium hydroxide Suspension 30 milliLiter(s) Oral daily PRN Constipation  ondansetron Injectable 4 milliGRAM(s) IV Push once PRN Nausea and/or Vomiting  oxyCODONE    IR 5 milliGRAM(s) Oral every 4 hours PRN Moderate Pain (4 - 6)  oxyCODONE    IR 10 milliGRAM(s) Oral every 4 hours PRN Severe Pain (7 - 10)    Vital Signs Last 24 Hrs  T(C): 36.3 (09 Oct 2018 16:27), Max: 36.9 (08 Oct 2018 22:03)  T(F): 97.4 (09 Oct 2018 16:27), Max: 98.5 (08 Oct 2018 22:03)  HR: 87 (09 Oct 2018 16:27) (80 - 92)  BP: 146/66 (09 Oct 2018 16:27) (136/64 - 147/64)  BP(mean): --  RR: 18 (09 Oct 2018 16:27) (18 - 18)  SpO2: 94% (09 Oct 2018 16:27) (94% - 97%)    PHYSICAL EXAM:  GENERAL: NAD, well-groomed, well-developed  HEAD:  Atraumatic, Normocephalic  EYES: EOMI, PERRLA, conjunctiva and sclera clear  ENMT: No tonsillar erythema, exudates, or enlargement; Moist mucous membranes, Good dentition, No lesions  NECK: Supple, No JVD, Normal thyroid  NERVOUS SYSTEM:  Alert & Oriented X3, Good concentration; Motor Strength 5/5 B/L upper and lower extremities; DTRs 2+ intact and symmetric  CHEST/LUNG: Clear to percussion bilaterally; No rales, rhonchi, wheezing, or rubs  HEART: Regular rate and rhythm; No murmurs, rubs, or gallops  ABDOMEN: Soft, Nontender, Nondistended; Bowel sounds present  EXTREMITIES: Bruising and swelling of the left LE  sepsis from left knee periprosthetic infection. S/p left knee washout and polyethylene liner exchange on 10/5.  LYMPH: No lymphadenopathy noted  SKIN: No rashes or lesions      LABS:    No labs obtained today 77 yo female PMhx anxiety and b/l total knee replacements (left knee done 2007) c/o fever and L knee pain. Patient seen at [facility] ED last night for left knee pain and fever's, was diagnose with infected L knee joint after arthrocentesis and was advised to stay for admission but left AMA after advising ED staff she would go to [facility]. Went home in AM and continued to having fevers at home with increasing pain in L knee so came back. Fever tmax ~104 at home rectally. No dizziness, no chest pain, no n/v, no weakness, no numbness/tingling. Patient seen now resting comfortably.  Had  washout of left knee. Patient had a hypotensive episode that responded to bolus of fluid.  Seen on ortho floor Resting comfortably now no chest pain or sob or abdominal pain.  Stressed incentive spirometry to patient. PICC line placed for prolonged antibiotics.    MEDICATIONS  (STANDING):  acetaminophen   Tablet .. 975 milliGRAM(s) Oral every 8 hours  amLODIPine   Tablet 5 milliGRAM(s) Oral daily  aspirin 325 milliGRAM(s) Oral two times a day  atorvastatin 10 milliGRAM(s) Oral at bedtime  ceFAZolin   IVPB 2000 milliGRAM(s) IV Intermittent every 8 hours  docusate sodium 100 milliGRAM(s) Oral three times a day  famotidine    Tablet 20 milliGRAM(s) Oral every 12 hours  furosemide    Tablet 20 milliGRAM(s) Oral two times a day  ketorolac   Injectable 15 milliGRAM(s) IV Push every 8 hours  PARoxetine 20 milliGRAM(s) Oral daily  rifampin 300 milliGRAM(s) Oral every 12 hours  senna 2 Tablet(s) Oral at bedtime    MEDICATIONS  (PRN):  ALPRAZolam 0.5 milliGRAM(s) Oral two times a day PRN anxiety  HYDROmorphone  Injectable 0.25 milliGRAM(s) IV Push every 10 minutes PRN Moderate Pain (4 - 6)  magnesium hydroxide Suspension 30 milliLiter(s) Oral daily PRN Constipation  ondansetron Injectable 4 milliGRAM(s) IV Push once PRN Nausea and/or Vomiting  oxyCODONE    IR 5 milliGRAM(s) Oral every 4 hours PRN Moderate Pain (4 - 6)  oxyCODONE    IR 10 milliGRAM(s) Oral every 4 hours PRN Severe Pain (7 - 10)    Vital Signs Last 24 Hrs  T(C): 36.3 (09 Oct 2018 16:27), Max: 36.9 (08 Oct 2018 22:03)  T(F): 97.4 (09 Oct 2018 16:27), Max: 98.5 (08 Oct 2018 22:03)  HR: 87 (09 Oct 2018 16:27) (80 - 92)  BP: 146/66 (09 Oct 2018 16:27) (136/64 - 147/64)  BP(mean): --  RR: 18 (09 Oct 2018 16:27) (18 - 18)  SpO2: 94% (09 Oct 2018 16:27) (94% - 97%)    PHYSICAL EXAM:  GENERAL: NAD, well-groomed, well-developed  HEAD:  Atraumatic, Normocephalic  EYES: EOMI, PERRLA, conjunctiva and sclera clear  ENMT: No tonsillar erythema, exudates, or enlargement; Moist mucous membranes, Good dentition, No lesions  NECK: Supple, No JVD, Normal thyroid  NERVOUS SYSTEM:  Alert & Oriented X3, Good concentration; Motor Strength 5/5 B/L upper and lower extremities; DTRs 2+ intact and symmetric  CHEST/LUNG: Clear to percussion bilaterally; No rales, rhonchi, wheezing, or rubs  HEART: Regular rate and rhythm; No murmurs, rubs, or gallops  ABDOMEN: Soft, Nontender, Nondistended; Bowel sounds present  EXTREMITIES: Bruising and swelling of the left LE  sepsis from left knee periprosthetic infection. S/p left knee washout and polyethylene liner exchange on 10/5.  LYMPH: No lymphadenopathy noted  SKIN: No rashes or lesions      LABS:    No labs obtained today

## 2018-10-09 NOTE — PROGRESS NOTE ADULT - ASSESSMENT
S/PI&D and poly exchange LT TKR      Plan  Plan for PICC today if BCxs are still negative  Abx per infectious disease team, final recs prior to discharge  OOB/PT   Dispo Planning, patient excited to get out of hospital

## 2018-10-09 NOTE — PROGRESS NOTE ADULT - ATTENDING COMMENTS
No medical complications post-op to date and to proceed with physical therapy, as tolerated. Continues pulmonary toilet to lessen atelectasis, leg exercises as taught to lessen the risk of DVT and supervised pain medications for post-op pain control. I anticipate discharge to home to follow when cleared by orthopedics. PICC line now functional.

## 2018-10-09 NOTE — PROVIDER CONTACT NOTE (OTHER) - ASSESSMENT
RN entered room and saw pt ruffling in purse while having blanket over her head, RN heard pills in a container ruffling noise that alarmed her, RN pulled blanket down and asked pt what she was doing, patient responded startled + quickly putting her purse away,"nothing im just looking for something", incident reported to RN Manager Madiha made aware, MD Ez Burch from ortho team made aware, Medications were confiscated until pt is d/c from hospital, bed alarm on, will cont to sarah.

## 2018-10-10 ENCOUNTER — TRANSCRIPTION ENCOUNTER (OUTPATIENT)
Age: 78
End: 2018-10-10

## 2018-10-10 VITALS
SYSTOLIC BLOOD PRESSURE: 140 MMHG | RESPIRATION RATE: 18 BRPM | HEART RATE: 82 BPM | TEMPERATURE: 98 F | OXYGEN SATURATION: 95 % | DIASTOLIC BLOOD PRESSURE: 70 MMHG

## 2018-10-10 LAB
-  AMPICILLIN/SULBACTAM: SIGNIFICANT CHANGE UP
-  CEFAZOLIN: SIGNIFICANT CHANGE UP
-  CLINDAMYCIN: SIGNIFICANT CHANGE UP
-  ERYTHROMYCIN: SIGNIFICANT CHANGE UP
-  GENTAMICIN: SIGNIFICANT CHANGE UP
-  OXACILLIN: SIGNIFICANT CHANGE UP
-  RIFAMPIN: SIGNIFICANT CHANGE UP
-  TETRACYCLINE: SIGNIFICANT CHANGE UP
-  TRIMETHOPRIM/SULFAMETHOXAZOLE: SIGNIFICANT CHANGE UP
-  VANCOMYCIN: SIGNIFICANT CHANGE UP
METHOD TYPE: SIGNIFICANT CHANGE UP

## 2018-10-10 PROCEDURE — 99232 SBSQ HOSP IP/OBS MODERATE 35: CPT

## 2018-10-10 RX ORDER — ACETAMINOPHEN 500 MG
3 TABLET ORAL
Qty: 0 | Refills: 0 | DISCHARGE
Start: 2018-10-10

## 2018-10-10 RX ORDER — INFLUENZA VIRUS VACCINE 15; 15; 15; 15 UG/.5ML; UG/.5ML; UG/.5ML; UG/.5ML
0.5 SUSPENSION INTRAMUSCULAR ONCE
Qty: 0 | Refills: 0 | Status: COMPLETED | OUTPATIENT
Start: 2018-10-10 | End: 2018-10-10

## 2018-10-10 RX ORDER — AMLODIPINE BESYLATE 2.5 MG/1
1 TABLET ORAL
Qty: 30 | Refills: 0
Start: 2018-10-10 | End: 2018-11-08

## 2018-10-10 RX ORDER — OXYCODONE HYDROCHLORIDE 5 MG/1
1 TABLET ORAL
Qty: 50 | Refills: 0
Start: 2018-10-10

## 2018-10-10 RX ORDER — FAMOTIDINE 10 MG/ML
1 INJECTION INTRAVENOUS
Qty: 60 | Refills: 0
Start: 2018-10-10 | End: 2018-11-08

## 2018-10-10 RX ORDER — DOCUSATE SODIUM 100 MG
1 CAPSULE ORAL
Qty: 0 | Refills: 0 | DISCHARGE
Start: 2018-10-10

## 2018-10-10 RX ORDER — ASPIRIN/CALCIUM CARB/MAGNESIUM 324 MG
1 TABLET ORAL
Qty: 72 | Refills: 0
Start: 2018-10-10 | End: 2018-11-14

## 2018-10-10 RX ADMIN — Medication 100 MILLIGRAM(S): at 05:41

## 2018-10-10 RX ADMIN — INFLUENZA VIRUS VACCINE 0.5 MILLILITER(S): 15; 15; 15; 15 SUSPENSION INTRAMUSCULAR at 13:44

## 2018-10-10 RX ADMIN — Medication 15 MILLIGRAM(S): at 13:08

## 2018-10-10 RX ADMIN — Medication 975 MILLIGRAM(S): at 00:29

## 2018-10-10 RX ADMIN — Medication 15 MILLIGRAM(S): at 05:41

## 2018-10-10 RX ADMIN — Medication 325 MILLIGRAM(S): at 05:41

## 2018-10-10 RX ADMIN — FAMOTIDINE 20 MILLIGRAM(S): 10 INJECTION INTRAVENOUS at 05:42

## 2018-10-10 RX ADMIN — Medication 20 MILLIGRAM(S): at 13:08

## 2018-10-10 RX ADMIN — Medication 100 MILLIGRAM(S): at 13:09

## 2018-10-10 RX ADMIN — OXYCODONE HYDROCHLORIDE 10 MILLIGRAM(S): 5 TABLET ORAL at 03:22

## 2018-10-10 RX ADMIN — Medication 15 MILLIGRAM(S): at 07:14

## 2018-10-10 RX ADMIN — AMLODIPINE BESYLATE 5 MILLIGRAM(S): 2.5 TABLET ORAL at 05:41

## 2018-10-10 RX ADMIN — Medication 15 MILLIGRAM(S): at 13:34

## 2018-10-10 RX ADMIN — Medication 20 MILLIGRAM(S): at 05:42

## 2018-10-10 RX ADMIN — Medication 975 MILLIGRAM(S): at 01:30

## 2018-10-10 RX ADMIN — OXYCODONE HYDROCHLORIDE 10 MILLIGRAM(S): 5 TABLET ORAL at 02:22

## 2018-10-10 NOTE — PROGRESS NOTE ADULT - NEGATIVE OPHTHALMOLOGIC SYMPTOMS
no blurred vision R/no blurred vision L
no blurred vision L/no blurred vision R

## 2018-10-10 NOTE — PROGRESS NOTE ADULT - PROBLEM SELECTOR PLAN 2
Low cholesterol diet
s/p I and d
-from PJI  -repeat bcx negative from 10/7  -cont abx  -TTE noted  -plan 6 weeks IV abx - day 3/42 of abx
-from PJI  -check repeat bcx in AM  -cont abx  -TTE noted
-from PJI  -repeat bcx negative from 10/7  -cont abx  -TTE noted  -plan 6 weeks IV abx
-from PJI  -repeat bcx negative from 10/7  -cont abx  -TTE noted  -plan 6 weeks IV abx - day 4/42 of abx

## 2018-10-10 NOTE — PROGRESS NOTE ADULT - RS GEN PE MLT RESP DETAILS PC
clear to auscultation bilaterally/respirations non-labored
clear to auscultation bilaterally/respirations non-labored
respirations non-labored/clear to auscultation bilaterally
clear to auscultation bilaterally/respirations non-labored

## 2018-10-10 NOTE — DISCHARGE NOTE ADULT - HOME CARE AGENCY
([facility] FOR IV INFUSION THERAPY-  [phone] )   ([facility] AT HOME - PHYSICAL THERAPY- [phone])

## 2018-10-10 NOTE — PROGRESS NOTE ADULT - NEGATIVE CARDIOVASCULAR SYMPTOMS
no chest pain/no palpitations
no palpitations/no chest pain

## 2018-10-10 NOTE — PROGRESS NOTE ADULT - PROVIDER SPECIALTY LIST ADULT
Cardiology
Infectious Disease
Internal Medicine
Orthopedics
SICU
Infectious Disease

## 2018-10-10 NOTE — DISCHARGE NOTE ADULT - CARE PROVIDERS DIRECT ADDRESSES
[email or web address],DirectAddress_Unknown

## 2018-10-10 NOTE — PROGRESS NOTE ADULT - SUBJECTIVE AND OBJECTIVE BOX
pain controlled  cleared pt for dispo home    afvss  nad  LLE  wound cdi, new aquacel placed  5/5 ta/ehl/gcs  silt L4-S1  2+ dp

## 2018-10-10 NOTE — DISCHARGE NOTE ADULT - CARE PROVIDER_API CALL
Bunny Montoya (MD), Orthopedics  [address]  [place] [address]  Phone: [phone]  Fax: [phone]    Shaheen Hernandez (DO), Medicine  [address] [place] [address]  Phone: [phone]  Fax: [phone]    Umberto Hendrix (MD; MBBS), Infectious Disease; Internal Medicine  [address] [place] [address]  Phone: [phone]  Fax: [phone]    Dada Shultz), Cardiovascular Disease; Internal Medicine  [address]  [place] [address]  Phone: [phone]  Fax: [phone]

## 2018-10-10 NOTE — PROGRESS NOTE ADULT - GASTROINTESTINAL DETAILS
no distention/soft/nontender/no rebound tenderness/no rigidity/no guarding
nontender/no distention/soft/no rebound tenderness/no rigidity/no guarding
no distention/nontender/no guarding/soft/no rebound tenderness/no rigidity
no rigidity/no distention/soft/nontender/no guarding

## 2018-10-10 NOTE — DISCHARGE NOTE ADULT - HOSPITAL COURSE
History of Present Illness: 	  78yFemale w/ hx of Sjogren's syndrome, HTN, HLD, BL TKA (2008 with Dr. Dorado, hospitals) and depression presented to ED c/o L knee pain for 4 days. Pt states that pain is worse with walking but can still bear weight. Pt denies radiation of pain. Pt denies numbness, tingling, or burning in the LLE. Fell 2 weeks ago but was still ambulating. +fever/chills. Pt is community ambulator without an assistive device. Was seen in ED early this AM and knee was aspirated. Growing GPC in both knee aspirate and blood. Patient left AMA initially to follow up at hospitals, but came back this afternoon with fevers again. Denies other recent illnesses.    PMH:  as above    PSH:  H/O total knee replacement  History of appendectomy  S/P cataract surgery    AH:  FILIPE    Meds: See med rec  10/5/18- Patient presents to the hospital with left knee prosthetic joint infection. Admitted, medically cleared and preop for surgery.  10/6/18- Patient taken to the OR for I & D, with poly exchange. Patient tolerated procedure without complication.  ID consult recommended IV antibiotic for 6 weeks  Patient was evaluated by Physical therapy whom recommended home for discharge plan.  Patient stable for discharge when home care arrangements are completed and PT cleared.

## 2018-10-10 NOTE — PROGRESS NOTE ADULT - NEUROLOGICAL DETAILS
responds to verbal commands
responds to verbal commands
responds to verbal commands/alert and oriented x 3
alert and oriented x 3/responds to verbal commands

## 2018-10-10 NOTE — PROGRESS NOTE ADULT - PROBLEM SELECTOR PROBLEM 3
Pain
Anxiety
Prosthetic joint infection

## 2018-10-10 NOTE — DISCHARGE NOTE ADULT - PATIENT PORTAL LINK FT
You can access the [facility] Patient Portal, offered by [facility], by registering with the following website: [email or web address]

## 2018-10-10 NOTE — PROGRESS NOTE ADULT - SUBJECTIVE AND OBJECTIVE BOX
Subjective: Patient seen and examined. No new events except as noted.     SUBJECTIVE/ROS:  No chest pain, dyspnea, palpitation, or dizziness.       MEDICATIONS:  MEDICATIONS  (STANDING):  acetaminophen   Tablet .. 975 milliGRAM(s) Oral every 8 hours  amLODIPine   Tablet 5 milliGRAM(s) Oral daily  aspirin 325 milliGRAM(s) Oral two times a day  atorvastatin 10 milliGRAM(s) Oral at bedtime  ceFAZolin   IVPB 2000 milliGRAM(s) IV Intermittent every 8 hours  docusate sodium 100 milliGRAM(s) Oral three times a day  famotidine    Tablet 20 milliGRAM(s) Oral every 12 hours  furosemide    Tablet 20 milliGRAM(s) Oral two times a day  ketorolac   Injectable 15 milliGRAM(s) IV Push every 8 hours  PARoxetine 20 milliGRAM(s) Oral daily  rifampin 300 milliGRAM(s) Oral every 12 hours  senna 2 Tablet(s) Oral at bedtime      PHYSICAL EXAM:  T(C): 36.8 (10-10-18 @ 13:41), Max: 37.2 (10-09-18 @ 21:09)  HR: 82 (10-10-18 @ 13:41) (75 - 92)  BP: 140/70 (10-10-18 @ 13:41) (138/74 - 162/75)  RR: 18 (10-10-18 @ 13:41) (18 - 18)  SpO2: 95% (10-10-18 @ 13:41) (93% - 96%)  Wt(kg): --  I&O's Summary    09 Oct 2018 07:01  -  10 Oct 2018 07:00  --------------------------------------------------------  IN: 1050 mL / OUT: 1400 mL / NET: -350 mL    10 Oct 2018 07:01  -  10 Oct 2018 14:50  --------------------------------------------------------  IN: 560 mL / OUT: 0 mL / NET: 560 mL          Appearance: Normal	  HEENT:   no gross abnormality   Cardiovascular: Normal S1 S2,    Murmur:   Neck: JVP normal  Respiratory: Lungs clear   Gastrointestinal:  Soft, Non-tender  Skin: normal   Neuro: No gross deficits.   Psychiatry:  Mood & affect flat  Ext: No edema      LABS/DATA:    CARDIAC MARKERS:                  proBNP:   Lipid Profile:   HgA1c:   TSH:     TELE:  EKG:

## 2018-10-10 NOTE — PROGRESS NOTE ADULT - ASSESSMENT
79 yo woman with a hx of a fall two weeks ago present with pain and swelling of the left knee found to have an infection of the left knee  periprosthetic infection. S/p left knee washout and polyethylene liner exchange on 10/5.  Patient seen now resting comfortably.  Had  washout of left knee. Patient had a hypotensive episode that responded to bolus of fluid.  Seen on ortho floor Resting comfortably now no chest pain or sob or abdominal pain.  Stressed incentive spirometry to patient. PICC line placed for prolonged antibiotics.

## 2018-10-10 NOTE — PROGRESS NOTE ADULT - PROBLEM SELECTOR PLAN 4
treat with Xanax.
Continue with long term antibiotics .
-due to above  -monitor temps

## 2018-10-10 NOTE — PROGRESS NOTE ADULT - PROBLEM SELECTOR PROBLEM 2
Hyperlipemia
Infection of prosthetic joint, initial encounter
Bacteremia due to Staphylococcus aureus

## 2018-10-10 NOTE — PROGRESS NOTE ADULT - PROBLEM SELECTOR PLAN 5
-cont ancef  -PICC   -potential side effects of PICC explained including bleeding and infection  -potential side effects of abx explained including GI, Cdiff, etc.
-cont ancef  -PICC   -potential side effects of PICC explained including bleeding and infection  -potential side effects of abx explained including GI, Cdiff, etc.
-cont ancef  -PICC eventually
-cont ancef  -PICC   -potential side effects of PICC explained including bleeding and infection  -potential side effects of abx explained including GI, Cdiff, etc.

## 2018-10-10 NOTE — PROGRESS NOTE ADULT - NEGATIVE ALLERGY TYPES
no reactions to medicines

## 2018-10-10 NOTE — PROGRESS NOTE ADULT - SUBJECTIVE AND OBJECTIVE BOX
79 yo female PMhx anxiety and b/l total knee replacements (left knee done 2007) c/o fever and L knee pain. Patient seen at [facility] ED last night for left knee pain and fever's, was diagnose with infected L knee joint after arthrocentesis and was advised to stay for admission but left AMA after advising ED staff she would go to [facility]. Went home in AM and continued to having fevers at home with increasing pain in L knee so came back. Fever tmax ~104 at home rectally. No dizziness, no chest pain, no n/v, no weakness, no numbness/tingling. Patient seen now resting comfortably.  Had  washout of left knee. Patient had a hypotensive episode that responded to bolus of fluid.  Seen on ortho floor Resting comfortably now no chest pain or sob or abdominal pain.  Stressed incentive spirometry to patient. PICC line placed for prolonged antibiotics.    MEDICATIONS  (STANDING):  acetaminophen   Tablet .. 975 milliGRAM(s) Oral every 8 hours  amLODIPine   Tablet 5 milliGRAM(s) Oral daily  aspirin 325 milliGRAM(s) Oral two times a day  atorvastatin 10 milliGRAM(s) Oral at bedtime  ceFAZolin   IVPB 2000 milliGRAM(s) IV Intermittent every 8 hours  docusate sodium 100 milliGRAM(s) Oral three times a day  famotidine    Tablet 20 milliGRAM(s) Oral every 12 hours  furosemide    Tablet 20 milliGRAM(s) Oral two times a day  ketorolac   Injectable 15 milliGRAM(s) IV Push every 8 hours  PARoxetine 20 milliGRAM(s) Oral daily  rifampin 300 milliGRAM(s) Oral every 12 hours  senna 2 Tablet(s) Oral at bedtime    MEDICATIONS  (PRN):  ALPRAZolam 0.5 milliGRAM(s) Oral two times a day PRN anxiety  HYDROmorphone  Injectable 0.25 milliGRAM(s) IV Push every 10 minutes PRN Moderate Pain (4 - 6)  magnesium hydroxide Suspension 30 milliLiter(s) Oral daily PRN Constipation  ondansetron Injectable 4 milliGRAM(s) IV Push once PRN Nausea and/or Vomiting  oxyCODONE    IR 5 milliGRAM(s) Oral every 4 hours PRN Moderate Pain (4 - 6)  oxyCODONE    IR 10 milliGRAM(s) Oral every 4 hours PRN Severe Pain (7 - 10)    Vital Signs Last 24 Hrs  T(C): 36.3 (09 Oct 2018 16:27), Max: 36.9 (08 Oct 2018 22:03)  T(F): 97.4 (09 Oct 2018 16:27), Max: 98.5 (08 Oct 2018 22:03)  HR: 87 (09 Oct 2018 16:27) (80 - 92)  BP: 146/66 (09 Oct 2018 16:27) (136/64 - 147/64)  BP(mean): --  RR: 18 (09 Oct 2018 16:27) (18 - 18)  SpO2: 94% (09 Oct 2018 16:27) (94% - 97%)    PHYSICAL EXAM:  GENERAL: NAD, well-groomed, well-developed  HEAD:  Atraumatic, Normocephalic  EYES: EOMI, PERRLA, conjunctiva and sclera clear  ENMT: No tonsillar erythema, exudates, or enlargement; Moist mucous membranes, Good dentition, No lesions  NECK: Supple, No JVD, Normal thyroid  NERVOUS SYSTEM:  Alert & Oriented X3, Good concentration; Motor Strength 5/5 B/L upper and lower extremities; DTRs 2+ intact and symmetric  CHEST/LUNG: Clear to percussion bilaterally; No rales, rhonchi, wheezing, or rubs  HEART: Regular rate and rhythm; No murmurs, rubs, or gallops  ABDOMEN: Soft, Nontender, Nondistended; Bowel sounds present  EXTREMITIES: Bruising and swelling of the left LE  sepsis from left knee periprosthetic infection. S/p left knee washout and polyethylene liner exchange on 10/5.  LYMPH: No lymphadenopathy noted  SKIN: No rashes or lesions      LABS:    No labs obtained today

## 2018-10-10 NOTE — PROGRESS NOTE ADULT - PROBLEM SELECTOR PROBLEM 5
Long term (current) use of antibiotics

## 2018-10-10 NOTE — PROGRESS NOTE ADULT - PROBLEM SELECTOR PLAN 3
Continue pain meds  follow for oversedation
Continue pain meds
Continue pain meds  follow for oversedation
start patient anti anxiety meds
-s/p I+D + polyexchange  -local care per ortho  -cont ancef  -rifampin 300 mg po BID added given hardware  -plan PO abx post IV abx  -f/u in ID office in 6 weeks [phone]
-s/p I+D + polyexchange  -f/u OR cx  -local care per ortho
-s/p I+D + polyexchange  -local care per ortho
-s/p I+D + polyexchange  -local care per ortho  -cont ancef  -rifampin 300 mg po BID added given hardware  -plan PO abx post IV abx  -f/u in ID office in 6 weeks [phone]

## 2018-10-10 NOTE — PROGRESS NOTE ADULT - PROBLEM SELECTOR PROBLEM 1
Septic joint of left knee joint
Long term (current) use of antibiotics
Sepsis due to Staphylococcus aureus

## 2018-10-10 NOTE — PROGRESS NOTE ADULT - REASON FOR ADMISSION
L TKA acute PJI
infected knee joint and Bacteremia
L TKA acute PJI

## 2018-10-10 NOTE — DISCHARGE NOTE ADULT - PLAN OF CARE
Pain relief, improvement with activities of daily living Please call Dr. Montoya's office within next few days to schedule a follow up appointment about 14 days after surgery. Dressing will be changed during office visit. Out of bed, ambulate, weight bearing as tolerated- Physical therapy to assist with exercise and help increase endurance

## 2018-10-10 NOTE — DISCHARGE NOTE ADULT - CARE PLAN
Principal Discharge DX:	Infection of prosthetic joint, initial encounter  Goal:	Pain relief, improvement with activities of daily living  Assessment and plan of treatment:	Please call Dr. Montoya's office within next few days to schedule a follow up appointment about 14 days after surgery. Dressing will be changed during office visit. Out of bed, ambulate, weight bearing as tolerated- Physical therapy to assist with exercise and help increase endurance

## 2018-10-10 NOTE — PROGRESS NOTE ADULT - PROBLEM SELECTOR PROBLEM 4
Anxiety
Bacteremia due to Staphylococcus aureus
Fever due to infection

## 2018-10-10 NOTE — PROGRESS NOTE ADULT - ASSESSMENT
doing well s/p L TKA I&D and PE exchange  asa 325bid x 30 days, venodynes for dvt ppx  ancef 2gq8 iv and rifampin 300mg bid po per ID for MSSA, picc placed yesterday, outpatient ID follow up  wbat  discharge home today, home pt, home nursing  2 week f/u in office

## 2018-10-10 NOTE — PROGRESS NOTE ADULT - SUBJECTIVE AND OBJECTIVE BOX
CASEY TIJERINA 78y [id number]    Patient is a 78y old  Female who presents with a chief complaint of L TKA acute PJI (10 Oct 2018 14:50)      Follow Up/CC:  ID following for bacteremia    Interval History/ROS: feels well, planning DC home today    Allergies    No Known Allergies    Intolerances        ANTIMICROBIALS:  ceFAZolin   IVPB 2000 every 8 hours  rifampin 300 every 12 hours      MEDICATIONS  (STANDING):  acetaminophen   Tablet .. 975 milliGRAM(s) Oral every 8 hours  amLODIPine   Tablet 5 milliGRAM(s) Oral daily  aspirin 325 milliGRAM(s) Oral two times a day  atorvastatin 10 milliGRAM(s) Oral at bedtime  ceFAZolin   IVPB 2000 milliGRAM(s) IV Intermittent every 8 hours  docusate sodium 100 milliGRAM(s) Oral three times a day  famotidine    Tablet 20 milliGRAM(s) Oral every 12 hours  furosemide    Tablet 20 milliGRAM(s) Oral two times a day  ketorolac   Injectable 15 milliGRAM(s) IV Push every 8 hours  PARoxetine 20 milliGRAM(s) Oral daily  rifampin 300 milliGRAM(s) Oral every 12 hours  senna 2 Tablet(s) Oral at bedtime    MEDICATIONS  (PRN):  ALPRAZolam 0.5 milliGRAM(s) Oral two times a day PRN anxiety  HYDROmorphone  Injectable 0.25 milliGRAM(s) IV Push every 10 minutes PRN Moderate Pain (4 - 6)  magnesium hydroxide Suspension 30 milliLiter(s) Oral daily PRN Constipation  ondansetron Injectable 4 milliGRAM(s) IV Push once PRN Nausea and/or Vomiting  oxyCODONE    IR 5 milliGRAM(s) Oral every 4 hours PRN Moderate Pain (4 - 6)  oxyCODONE    IR 10 milliGRAM(s) Oral every 4 hours PRN Severe Pain (7 - 10)        Vital Signs Last 24 Hrs  T(C): 36.8 (10 Oct 2018 13:41), Max: 37.2 (09 Oct 2018 21:09)  T(F): 98.2 (10 Oct 2018 13:41), Max: 98.9 (09 Oct 2018 21:09)  HR: 82 (10 Oct 2018 13:41) (75 - 92)  BP: 140/70 (10 Oct 2018 13:41) (138/74 - 162/75)  BP(mean): --  RR: 18 (10 Oct 2018 13:41) (18 - 18)  SpO2: 95% (10 Oct 2018 13:41) (93% - 96%)                  MICROBIOLOGY:  .Blood Blood-Venous  10-08-18   No growth to date.  --  --      .Blood Blood  10-08-18   No growth to date.  --  --      .Blood Blood-Peripheral  10-07-18   No growth to date.  --  --      .Body Fluid Synovial Fluid  10-06-18   Numerous Staphylococcus aureus  --  Staphylococcus aureus      .Blood Blood  10-06-18   Growth in aerobic and anaerobic bottles: Staphylococcus aureus  See previous culture [id number]  --    Growth in aerobic bottle: Gram Positive Cocci in Clusters  Growth in anaerobic bottle: Gram Positive Cocci in Clusters      .Blood Blood  10-06-18   Growth in aerobic and anaerobic bottles: Staphylococcus aureus  See previous culture [id number]  --    Growth in aerobic bottle: Gram Positive Cocci in Clusters  Growth in anaerobic bottle: Gram Positive Cocci in Clusters      .Blood Blood-Peripheral  10-04-18   Growth in aerobic and anaerobic bottles: Staphylococcus aureus  See previous culture [id number]  --    Growth in aerobic bottle: Gram Positive Cocci in Clusters  Growth in anaerobic bottle: Gram Positive Cocci in Clusters      .Urine Clean Catch (Midstream)  10-04-18   No growth  --  --      .Body Fluid Knee Fluid  10-04-18   Moderate Staphylococcus aureus  --  Staphylococcus aureus      .Blood Blood-Peripheral  10-03-18   Growth in aerobic and anaerobic bottles: Staphylococcus aureus  See previous culture [id number]  --  Blood Culture PCR  Staphylococcus aureus      RADIOLOGY    Xray Chest 1 View- PORTABLE-Urgent (10.09.18 @ 13:44) >  Right PICC line with distal tip in the SVC.. No pneumothorax

## 2018-10-10 NOTE — PROGRESS NOTE ADULT - NEGATIVE GASTROINTESTINAL SYMPTOMS
no nausea/no vomiting/no diarrhea/no abdominal pain
no abdominal pain/no nausea/no vomiting/no diarrhea
no nausea/no vomiting/no diarrhea/no abdominal pain
no vomiting/no diarrhea/no abdominal pain/no nausea

## 2018-10-10 NOTE — DISCHARGE NOTE ADULT - MEDICATION SUMMARY - MEDICATIONS TO TAKE
I will START or STAY ON the medications listed below when I get home from the hospital:    acetaminophen 325 mg oral tablet  -- 3 tab(s) by mouth every 8 hours  -- Indication: For Pain    aspirin 325 mg oral tablet  -- 1 tab(s) by mouth 2 times a day, x 6 weeks  -- Indication: For Antiplatelet therapy    oxyCODONE 5 mg oral tablet  -- 1 or 2 tab(s) by mouth every 4 hours, As needed, for Pain MDD:8  -- Indication: For Pain medication    Paxil 20 mg oral tablet  -- 1 tab(s) by mouth once a day  -- Indication: For Antidepressant agent    atorvastatin 10 mg oral tablet  -- 1 tab(s) by mouth once a day  -- Indication: For Antihyperlipidemia agent    rifAMPin 300 mg oral capsule  -- 1 cap(s) by mouth every 12 hours  -- Indication: For Antibiotic     zolpidem 10 mg oral tablet  -- 1 tab(s) by mouth once a day (at bedtime), As Needed  -- Indication: For Sleep aide    Xanax 0.5 mg oral tablet  -- 1 tab(s) by mouth 2 times a day, As Needed   -- Indication: For Antianxiety agent    amLODIPine 5 mg oral tablet  -- 1 tab(s) by mouth once a day  -- Indication: For Antihypertensive agent    ceFAZolin 2 g intravenous injection  -- 2 gram(s) intravenously via PICC every 8 hours MDD:6 grams UNTIL END DATE: 11/17/18  -- Indication: For Antibiotic agent    furosemide 20 mg oral tablet  -- 1 tab(s) by mouth 2 times a day  -- Indication: For Diuretic    famotidine 20 mg oral tablet  -- 1 tab(s) by mouth every 12 hours  -- Indication: For Antidyspepsia agent    docusate sodium 100 mg oral capsule  -- 1 cap(s) by mouth 3 times a day  -- Indication: For Stool

## 2018-10-10 NOTE — PROGRESS NOTE ADULT - PROBLEM SELECTOR PLAN 6
-resolved  -monitor clinically
-low threshold for MRI spine to r/o discitis/OM/paraspinal abscess due to SA
-resolved  -monitor clinically
-resolved  -monitor clinically

## 2018-10-10 NOTE — PROGRESS NOTE ADULT - NEGATIVE ENMT SYMPTOMS
no ear pain/no nasal congestion/no throat pain
no nasal congestion/no ear pain/no throat pain
no throat pain/no ear pain/no nasal congestion
no throat pain/no nasal congestion/no ear pain

## 2018-10-10 NOTE — PROGRESS NOTE ADULT - PROBLEM SELECTOR PLAN 1
Patient with poly exchange   continue abx as per ID  awaiting PICC  length of abx course as per ID
Spacer placed left knee afer washout
Spacer plsaace ftalex herndon
Spacer placed left knee after washout
Patient with poly exchange   continue abx as per ID  awaiting PICC  length of abx course as per ID
Picc line present for  antibioic as
-cont Ancef  -care per ortho  -better
-cont Ancef  -DC vanco  -f/u final cx results  -care per SICU
-cont Ancef  -care per ortho  -better
-cont Ancef  -care per ortho  -better

## 2018-10-10 NOTE — PROGRESS NOTE ADULT - PROBLEM SELECTOR PLAN 7
-on long term abx  -check weekly cbc, cmp, esr, crp while on IV abx - fax [phone]
-on long term abx  -check weekly cbc, cmp, esr, crp while on IV abx
-on long term abx  -check weekly cbc, cmp, esr, crp while on IV abx - fax [phone]

## 2018-10-10 NOTE — DISCHARGE NOTE ADULT - NS AS ACTIVITY OBS
Walking-Outdoors allowed/No Heavy lifting/straining/Stairs allowed/Walking-Indoors allowed/Do not make important decisions/Do not drive or operate machinery/Patient may shower, limit direct water to dressing, if wet pat dry

## 2018-10-10 NOTE — PROGRESS NOTE ADULT - ATTENDING COMMENTS
Umberto Hendrix  Attending Physician   Division of Infectious Disease  Pager #[phone]  After 5pm/weekend or no response, call #[phone]

## 2018-10-10 NOTE — PROGRESS NOTE ADULT - ASSESSMENT
hypotension resolved  HTN  cont current meds     septic joint  s/p  wash out   anbx  ID consult noted   follow up culture   no veg on echo     dc planning as per primary team

## 2018-10-11 ENCOUNTER — MEDICATION RENEWAL (OUTPATIENT)
Age: 78
End: 2018-10-11

## 2018-10-11 ENCOUNTER — RX RENEWAL (OUTPATIENT)
Age: 78
End: 2018-10-11

## 2018-10-11 DIAGNOSIS — T84.50XA INFECTION AND INFLAMMATORY REACTION DUE TO UNSPECIFIED INTERNAL JOINT PROSTHESIS, INITIAL ENCOUNTER: ICD-10-CM

## 2018-10-11 LAB
CULTURE RESULTS: SIGNIFICANT CHANGE UP
ORGANISM # SPEC MICROSCOPIC CNT: SIGNIFICANT CHANGE UP
ORGANISM # SPEC MICROSCOPIC CNT: SIGNIFICANT CHANGE UP
SPECIMEN SOURCE: SIGNIFICANT CHANGE UP

## 2018-10-12 LAB
CULTURE RESULTS: SIGNIFICANT CHANGE UP
CULTURE RESULTS: SIGNIFICANT CHANGE UP
SPECIMEN SOURCE: SIGNIFICANT CHANGE UP
SPECIMEN SOURCE: SIGNIFICANT CHANGE UP

## 2018-10-17 ENCOUNTER — CHART COPY (OUTPATIENT)
Age: 78
End: 2018-10-17

## 2018-10-22 ENCOUNTER — RX RENEWAL (OUTPATIENT)
Age: 78
End: 2018-10-22

## 2018-10-22 ENCOUNTER — APPOINTMENT (OUTPATIENT)
Dept: ORTHOPEDIC SURGERY | Facility: CLINIC | Age: 78
End: 2018-10-22
Payer: MEDICARE

## 2018-10-22 DIAGNOSIS — Z78.9 OTHER SPECIFIED HEALTH STATUS: ICD-10-CM

## 2018-10-22 PROCEDURE — 99024 POSTOP FOLLOW-UP VISIT: CPT

## 2018-10-22 PROCEDURE — 73562 X-RAY EXAM OF KNEE 3: CPT | Mod: LT

## 2018-10-31 ENCOUNTER — APPOINTMENT (OUTPATIENT)
Dept: ORTHOPEDIC SURGERY | Facility: CLINIC | Age: 78
End: 2018-10-31
Payer: MEDICARE

## 2018-10-31 ENCOUNTER — CHART COPY (OUTPATIENT)
Age: 78
End: 2018-10-31

## 2018-10-31 VITALS
HEART RATE: 99 BPM | DIASTOLIC BLOOD PRESSURE: 70 MMHG | HEIGHT: 66 IN | SYSTOLIC BLOOD PRESSURE: 143 MMHG | WEIGHT: 150 LBS | BODY MASS INDEX: 24.11 KG/M2

## 2018-10-31 PROCEDURE — 99214 OFFICE O/P EST MOD 30 MIN: CPT | Mod: 24,25

## 2018-10-31 PROCEDURE — 20610 DRAIN/INJ JOINT/BURSA W/O US: CPT | Mod: 79,RT

## 2018-10-31 PROCEDURE — 73564 X-RAY EXAM KNEE 4 OR MORE: CPT | Mod: 50

## 2018-10-31 RX ORDER — OXYCODONE AND ACETAMINOPHEN 5; 325 MG/1; MG/1
5-325 TABLET ORAL
Qty: 14 | Refills: 0 | Status: ACTIVE | COMMUNITY
Start: 2018-10-02

## 2018-11-01 ENCOUNTER — CHART COPY (OUTPATIENT)
Age: 78
End: 2018-11-01

## 2018-11-01 LAB
B PERT IGG+IGM PNL SER: ABNORMAL
COLOR FLD: NORMAL
CRP SERPL-MCNC: 6.58 MG/DL
ERYTHROCYTE [SEDIMENTATION RATE] IN BLOOD BY WESTERGREN METHOD: 55 MM/HR
FLUID INTAKE SUBSTANCE CLASS: NORMAL
LYMPHOCYTES # FLD MANUAL: 6 %
MONOS+MACROS NFR FLD MANUAL: 2 %
NEUTS SEG # FLD MANUAL: 92 %
RBC # FLD MANUAL: 4000 /UL
TOTAL CELLS COUNTED FLD: 3844 /UL
TUBE TYPE: NORMAL

## 2018-11-02 ENCOUNTER — APPOINTMENT (OUTPATIENT)
Dept: INTERNAL MEDICINE | Facility: CLINIC | Age: 78
End: 2018-11-02
Payer: MEDICARE

## 2018-11-02 VITALS — HEART RATE: 80 BPM | RESPIRATION RATE: 16 BRPM | SYSTOLIC BLOOD PRESSURE: 130 MMHG | DIASTOLIC BLOOD PRESSURE: 70 MMHG

## 2018-11-02 PROCEDURE — 99213 OFFICE O/P EST LOW 20 MIN: CPT

## 2018-11-02 RX ORDER — AZITHROMYCIN DIHYDRATE 250 MG/1
250 TABLET, FILM COATED ORAL
Qty: 1 | Refills: 0 | Status: DISCONTINUED | COMMUNITY
Start: 2018-07-09 | End: 2018-11-02

## 2018-11-02 RX ORDER — AMLODIPINE BESYLATE 5 MG/1
5 TABLET ORAL
Qty: 30 | Refills: 0 | Status: DISCONTINUED | COMMUNITY
Start: 2018-10-10 | End: 2018-11-02

## 2018-11-02 RX ORDER — CEFUROXIME AXETIL 500 MG/1
500 TABLET ORAL
Qty: 20 | Refills: 0 | Status: DISCONTINUED | COMMUNITY
Start: 2018-07-18 | End: 2018-11-02

## 2018-11-02 NOTE — PHYSICAL EXAM
[No Acute Distress] : no acute distress [Well Nourished] : well nourished [Well Developed] : well developed [Well-Appearing] : well-appearing [Normal Sclera/Conjunctiva] : normal sclera/conjunctiva [Normal Oropharynx] : the oropharynx was normal [Supple] : supple [No Lymphadenopathy] : no lymphadenopathy [No Respiratory Distress] : no respiratory distress  [Clear to Auscultation] : lungs were clear to auscultation bilaterally [No Accessory Muscle Use] : no accessory muscle use [Normal Rate] : normal rate  [Regular Rhythm] : with a regular rhythm [Normal S1, S2] : normal S1 and S2 [No Murmur] : no murmur heard [No Edema] : there was no peripheral edema [Soft] : abdomen soft [Non Tender] : non-tender [No HSM] : no HSM [Normal Supraclavicular Nodes] : no supraclavicular lymphadenopathy [Normal Posterior Cervical Nodes] : no posterior cervical lymphadenopathy [Normal Anterior Cervical Nodes] : no anterior cervical lymphadenopathy [de-identified] : SP bilateral prosthetic knees - not warm or red.

## 2018-11-02 NOTE — ASSESSMENT
[FreeTextEntry1] : Pt with infected prosthetic knee\par To finish IV antibiotics\par FU ortho and ID

## 2018-11-02 NOTE — HISTORY OF PRESENT ILLNESS
[FreeTextEntry1] : Was in [facility]  -for septic arthritis - on home IV antibiotics\par Had other knee tapped few days ago\par ? fever\par Going to fu with ID\par

## 2018-11-05 ENCOUNTER — CHART COPY (OUTPATIENT)
Age: 78
End: 2018-11-05

## 2018-11-11 PROCEDURE — 84295 ASSAY OF SERUM SODIUM: CPT

## 2018-11-11 PROCEDURE — 97165 OT EVAL LOW COMPLEX 30 MIN: CPT

## 2018-11-11 PROCEDURE — 87070 CULTURE OTHR SPECIMN AEROBIC: CPT

## 2018-11-11 PROCEDURE — 80053 COMPREHEN METABOLIC PANEL: CPT

## 2018-11-11 PROCEDURE — 84145 PROCALCITONIN (PCT): CPT

## 2018-11-11 PROCEDURE — 82803 BLOOD GASES ANY COMBINATION: CPT

## 2018-11-11 PROCEDURE — 83605 ASSAY OF LACTIC ACID: CPT

## 2018-11-11 PROCEDURE — 97110 THERAPEUTIC EXERCISES: CPT

## 2018-11-11 PROCEDURE — 82435 ASSAY OF BLOOD CHLORIDE: CPT

## 2018-11-11 PROCEDURE — 73562 X-RAY EXAM OF KNEE 3: CPT

## 2018-11-11 PROCEDURE — 86901 BLOOD TYPING SEROLOGIC RH(D): CPT

## 2018-11-11 PROCEDURE — 86140 C-REACTIVE PROTEIN: CPT

## 2018-11-11 PROCEDURE — 83735 ASSAY OF MAGNESIUM: CPT

## 2018-11-11 PROCEDURE — 84157 ASSAY OF PROTEIN OTHER: CPT

## 2018-11-11 PROCEDURE — 97530 THERAPEUTIC ACTIVITIES: CPT

## 2018-11-11 PROCEDURE — 87186 SC STD MICRODIL/AGAR DIL: CPT

## 2018-11-11 PROCEDURE — 87015 SPECIMEN INFECT AGNT CONCNTJ: CPT

## 2018-11-11 PROCEDURE — 87075 CULTR BACTERIA EXCEPT BLOOD: CPT

## 2018-11-11 PROCEDURE — 73590 X-RAY EXAM OF LOWER LEG: CPT

## 2018-11-11 PROCEDURE — 86850 RBC ANTIBODY SCREEN: CPT

## 2018-11-11 PROCEDURE — 86900 BLOOD TYPING SEROLOGIC ABO: CPT

## 2018-11-11 PROCEDURE — 97161 PT EVAL LOW COMPLEX 20 MIN: CPT

## 2018-11-11 PROCEDURE — 85014 HEMATOCRIT: CPT

## 2018-11-11 PROCEDURE — 87116 MYCOBACTERIA CULTURE: CPT

## 2018-11-11 PROCEDURE — 93005 ELECTROCARDIOGRAM TRACING: CPT

## 2018-11-11 PROCEDURE — 76377 3D RENDER W/INTRP POSTPROCES: CPT

## 2018-11-11 PROCEDURE — 90686 IIV4 VACC NO PRSV 0.5 ML IM: CPT

## 2018-11-11 PROCEDURE — 97116 GAIT TRAINING THERAPY: CPT

## 2018-11-11 PROCEDURE — 80048 BASIC METABOLIC PNL TOTAL CA: CPT

## 2018-11-11 PROCEDURE — 84100 ASSAY OF PHOSPHORUS: CPT

## 2018-11-11 PROCEDURE — 99285 EMERGENCY DEPT VISIT HI MDM: CPT | Mod: 25

## 2018-11-11 PROCEDURE — 81001 URINALYSIS AUTO W/SCOPE: CPT

## 2018-11-11 PROCEDURE — 87205 SMEAR GRAM STAIN: CPT

## 2018-11-11 PROCEDURE — 99284 EMERGENCY DEPT VISIT MOD MDM: CPT | Mod: 25

## 2018-11-11 PROCEDURE — 96361 HYDRATE IV INFUSION ADD-ON: CPT

## 2018-11-11 PROCEDURE — C1751: CPT

## 2018-11-11 PROCEDURE — C1776: CPT

## 2018-11-11 PROCEDURE — 84132 ASSAY OF SERUM POTASSIUM: CPT

## 2018-11-11 PROCEDURE — 36569 INSJ PICC 5 YR+ W/O IMAGING: CPT

## 2018-11-11 PROCEDURE — 88300 SURGICAL PATH GROSS: CPT

## 2018-11-11 PROCEDURE — 85652 RBC SED RATE AUTOMATED: CPT

## 2018-11-11 PROCEDURE — 96375 TX/PRO/DX INJ NEW DRUG ADDON: CPT

## 2018-11-11 PROCEDURE — 85027 COMPLETE CBC AUTOMATED: CPT

## 2018-11-11 PROCEDURE — 93306 TTE W/DOPPLER COMPLETE: CPT

## 2018-11-11 PROCEDURE — 89051 BODY FLUID CELL COUNT: CPT

## 2018-11-11 PROCEDURE — 85610 PROTHROMBIN TIME: CPT

## 2018-11-11 PROCEDURE — 87086 URINE CULTURE/COLONY COUNT: CPT

## 2018-11-11 PROCEDURE — 96365 THER/PROPH/DIAG IV INF INIT: CPT

## 2018-11-11 PROCEDURE — 87102 FUNGUS ISOLATION CULTURE: CPT

## 2018-11-11 PROCEDURE — 83930 ASSAY OF BLOOD OSMOLALITY: CPT

## 2018-11-11 PROCEDURE — 82330 ASSAY OF CALCIUM: CPT

## 2018-11-11 PROCEDURE — 73700 CT LOWER EXTREMITY W/O DYE: CPT

## 2018-11-11 PROCEDURE — 85730 THROMBOPLASTIN TIME PARTIAL: CPT

## 2018-11-11 PROCEDURE — 96374 THER/PROPH/DIAG INJ IV PUSH: CPT

## 2018-11-11 PROCEDURE — 87040 BLOOD CULTURE FOR BACTERIA: CPT

## 2018-11-11 PROCEDURE — 73560 X-RAY EXAM OF KNEE 1 OR 2: CPT

## 2018-11-11 PROCEDURE — 87150 DNA/RNA AMPLIFIED PROBE: CPT

## 2018-11-11 PROCEDURE — 82947 ASSAY GLUCOSE BLOOD QUANT: CPT

## 2018-11-11 PROCEDURE — 71045 X-RAY EXAM CHEST 1 VIEW: CPT

## 2018-11-11 PROCEDURE — 87206 SMEAR FLUORESCENT/ACID STAI: CPT

## 2018-11-11 PROCEDURE — 82945 GLUCOSE OTHER FLUID: CPT

## 2018-11-14 LAB — BACTERIA FLD CULT: NORMAL

## 2018-11-24 LAB
CULTURE RESULTS: SIGNIFICANT CHANGE UP
SPECIMEN SOURCE: SIGNIFICANT CHANGE UP

## 2018-11-27 ENCOUNTER — APPOINTMENT (OUTPATIENT)
Dept: INFECTIOUS DISEASE | Facility: CLINIC | Age: 78
End: 2018-11-27
Payer: MEDICARE

## 2018-11-27 VITALS
WEIGHT: 153 LBS | BODY MASS INDEX: 24.7 KG/M2 | OXYGEN SATURATION: 100 % | RESPIRATION RATE: 16 BRPM | HEART RATE: 87 BPM | SYSTOLIC BLOOD PRESSURE: 145 MMHG | TEMPERATURE: 97.2 F | DIASTOLIC BLOOD PRESSURE: 74 MMHG

## 2018-11-27 DIAGNOSIS — R78.81 BACTEREMIA: ICD-10-CM

## 2018-11-27 PROCEDURE — 99213 OFFICE O/P EST LOW 20 MIN: CPT

## 2018-11-30 LAB
BASOPHILS # BLD AUTO: 0.09 K/UL
BASOPHILS NFR BLD AUTO: 1.8 %
CRP SERPL-MCNC: 2.64 MG/DL
EOSINOPHIL # BLD AUTO: 0.8 K/UL
EOSINOPHIL NFR BLD AUTO: 15.9 %
ERYTHROCYTE [SEDIMENTATION RATE] IN BLOOD BY WESTERGREN METHOD: 57 MM/HR
HCT VFR BLD CALC: 35.1 %
HGB BLD-MCNC: 10.8 G/DL
IMM GRANULOCYTES NFR BLD AUTO: 0.2 %
LYMPHOCYTES # BLD AUTO: 1.38 K/UL
LYMPHOCYTES NFR BLD AUTO: 27.4 %
MAN DIFF?: NORMAL
MCHC RBC-ENTMCNC: 30.4 PG
MCHC RBC-ENTMCNC: 30.8 GM/DL
MCV RBC AUTO: 98.9 FL
MONOCYTES # BLD AUTO: 0.49 K/UL
MONOCYTES NFR BLD AUTO: 9.7 %
NEUTROPHILS # BLD AUTO: 2.27 K/UL
NEUTROPHILS NFR BLD AUTO: 45 %
PLATELET # BLD AUTO: 367 K/UL
RBC # BLD: 3.55 M/UL
RBC # FLD: 14.4 %
WBC # FLD AUTO: 5.04 K/UL

## 2018-12-02 LAB — FUNGUS FLD CULT: NORMAL

## 2018-12-03 LAB
BACTERIA BLD CULT: NORMAL
BACTERIA BLD CULT: NORMAL

## 2018-12-04 ENCOUNTER — RX RENEWAL (OUTPATIENT)
Age: 78
End: 2018-12-04

## 2018-12-08 ENCOUNTER — RX RENEWAL (OUTPATIENT)
Age: 78
End: 2018-12-08

## 2018-12-10 ENCOUNTER — APPOINTMENT (OUTPATIENT)
Dept: ORTHOPEDIC SURGERY | Facility: CLINIC | Age: 78
End: 2018-12-10

## 2018-12-11 ENCOUNTER — APPOINTMENT (OUTPATIENT)
Dept: ORTHOPEDIC SURGERY | Facility: CLINIC | Age: 78
End: 2018-12-11
Payer: MEDICARE

## 2018-12-11 DIAGNOSIS — Z96.652 PRESENCE OF LEFT ARTIFICIAL KNEE JOINT: ICD-10-CM

## 2018-12-11 PROCEDURE — 99024 POSTOP FOLLOW-UP VISIT: CPT

## 2018-12-28 ENCOUNTER — MEDICATION RENEWAL (OUTPATIENT)
Age: 78
End: 2018-12-28

## 2019-01-03 ENCOUNTER — APPOINTMENT (OUTPATIENT)
Dept: INFECTIOUS DISEASE | Facility: CLINIC | Age: 79
End: 2019-01-03
Payer: MEDICARE

## 2019-01-03 ENCOUNTER — LABORATORY RESULT (OUTPATIENT)
Age: 79
End: 2019-01-03

## 2019-01-03 VITALS
RESPIRATION RATE: 17 BRPM | HEIGHT: 66 IN | SYSTOLIC BLOOD PRESSURE: 156 MMHG | TEMPERATURE: 97.8 F | OXYGEN SATURATION: 96 % | HEART RATE: 98 BPM | WEIGHT: 157 LBS | BODY MASS INDEX: 25.23 KG/M2 | DIASTOLIC BLOOD PRESSURE: 87 MMHG

## 2019-01-03 PROCEDURE — 99214 OFFICE O/P EST MOD 30 MIN: CPT

## 2019-01-03 RX ORDER — CEFAZOLIN 10 G/1
10 INJECTION, POWDER, FOR SOLUTION INTRAVENOUS
Qty: 5 | Refills: 0 | Status: DISCONTINUED | COMMUNITY
Start: 2018-10-10 | End: 2019-01-03

## 2019-01-03 RX ORDER — RIFAMPIN 300 MG/1
300 CAPSULE ORAL
Qty: 1 | Refills: 2 | Status: DISCONTINUED | COMMUNITY
Start: 2018-10-10 | End: 2019-01-03

## 2019-01-04 LAB
ALBUMIN SERPL ELPH-MCNC: 3.5 G/DL
ALP BLD-CCNC: 181 U/L
ALT SERPL-CCNC: 18 U/L
ANION GAP SERPL CALC-SCNC: 13 MMOL/L
AST SERPL-CCNC: 18 U/L
BASOPHILS # BLD AUTO: 0.02 K/UL
BASOPHILS NFR BLD AUTO: 0.3 %
BILIRUB SERPL-MCNC: 0.2 MG/DL
BUN SERPL-MCNC: 12 MG/DL
CALCIUM SERPL-MCNC: 9.3 MG/DL
CHLORIDE SERPL-SCNC: 100 MMOL/L
CO2 SERPL-SCNC: 24 MMOL/L
CREAT SERPL-MCNC: 0.58 MG/DL
CRP SERPL-MCNC: 12.31 MG/DL
EOSINOPHIL # BLD AUTO: 0.13 K/UL
EOSINOPHIL NFR BLD AUTO: 2.2 %
ERYTHROCYTE [SEDIMENTATION RATE] IN BLOOD BY WESTERGREN METHOD: 85 MM/HR
GLUCOSE SERPL-MCNC: 113 MG/DL
HCT VFR BLD CALC: 33.7 %
HGB BLD-MCNC: 10.5 G/DL
IMM GRANULOCYTES NFR BLD AUTO: 0.3 %
LYMPHOCYTES # BLD AUTO: 1.01 K/UL
LYMPHOCYTES NFR BLD AUTO: 17.4 %
MAN DIFF?: NORMAL
MCHC RBC-ENTMCNC: 28.9 PG
MCHC RBC-ENTMCNC: 31.2 GM/DL
MCV RBC AUTO: 92.8 FL
MONOCYTES # BLD AUTO: 0.71 K/UL
MONOCYTES NFR BLD AUTO: 12.2 %
NEUTROPHILS # BLD AUTO: 3.92 K/UL
NEUTROPHILS NFR BLD AUTO: 67.6 %
PLATELET # BLD AUTO: 436 K/UL
POTASSIUM SERPL-SCNC: 4.2 MMOL/L
PROT SERPL-MCNC: 7.6 G/DL
RBC # BLD: 3.63 M/UL
RBC # FLD: 13.3 %
SODIUM SERPL-SCNC: 137 MMOL/L
WBC # FLD AUTO: 5.81 K/UL

## 2019-01-04 NOTE — ASSESSMENT
[Treatment Education] : treatment education [Treatment Adherence] : treatment adherence [Rx Dose / Side Effects] : Rx dose/side effects [FreeTextEntry1] : 77 y/o female with MSSA bacteremia, left knee PJI, s/p I+D comes for f/u visit.\par \par Cont keflex. Advised OTC probiotic. \par \par Rifampin stopped due to GI issues.\par \par Will check labs noted below. \par \par To see her Ortho surgeon next week. \par \par Concern for for possible PJI given fevers and pain. Advised joint tap when she sees her surgeon next week. \par \par

## 2019-01-04 NOTE — HISTORY OF PRESENT ILLNESS
[FreeTextEntry1] : 79 y/o female comes for f/u visit.\par \par She was in the hospital for MSSA bacteremia/left knee PJI, s/p I+D/liner change. She was DC with IV Ancef/rifampin wit PIC. She has completed 6 weeks iv abx. Her knee is better and has occasional swelling/discomfort. \par \par Concern earlier in the month for possible right knee PJI. She is on po Keflex.\par \par No fevers, GI symptoms. Able to ambulate with cane. \par \par C/o increasing knee pain in both knees. c/o low grade fevers.\par \par Stopped rifampin 1-2 weeks ago cause of GI issues.

## 2019-01-09 LAB
BACTERIA BLD CULT: NORMAL
BACTERIA BLD CULT: NORMAL

## 2019-01-15 ENCOUNTER — APPOINTMENT (OUTPATIENT)
Dept: INTERNAL MEDICINE | Facility: CLINIC | Age: 79
End: 2019-01-15

## 2019-01-15 ENCOUNTER — APPOINTMENT (OUTPATIENT)
Dept: INTERNAL MEDICINE | Facility: CLINIC | Age: 79
End: 2019-01-15
Payer: MEDICARE

## 2019-01-15 VITALS
DIASTOLIC BLOOD PRESSURE: 75 MMHG | HEIGHT: 66 IN | WEIGHT: 158 LBS | HEART RATE: 72 BPM | SYSTOLIC BLOOD PRESSURE: 120 MMHG | RESPIRATION RATE: 16 BRPM | BODY MASS INDEX: 25.39 KG/M2

## 2019-01-15 PROCEDURE — 99213 OFFICE O/P EST LOW 20 MIN: CPT

## 2019-01-15 NOTE — HISTORY OF PRESENT ILLNESS
[FreeTextEntry1] : FU for infected knee's  -has been seeing ID CRP elevated and having fever  -can get to 102\par Seeing ortho and needs to culture knees.\par Pt does not want to take out prosthetic knees

## 2019-01-15 NOTE — ASSESSMENT
[FreeTextEntry1] : Pt with infected knees - to fu with ortho and ID - \par Will need cultures from knees  -pt to discuss with ID if and when she should hold antibiotics before cultures done.

## 2019-01-15 NOTE — PHYSICAL EXAM
[No Acute Distress] : no acute distress [Normal Oropharynx] : the oropharynx was normal [No JVD] : no jugular venous distention [Supple] : supple [No Lymphadenopathy] : no lymphadenopathy [No Respiratory Distress] : no respiratory distress  [Clear to Auscultation] : lungs were clear to auscultation bilaterally [No Accessory Muscle Use] : no accessory muscle use [Normal Rate] : normal rate  [Regular Rhythm] : with a regular rhythm [Normal S1, S2] : normal S1 and S2 [No Edema] : there was no peripheral edema [de-identified] : Knees tender - but not red or warm.

## 2019-02-07 ENCOUNTER — RX RENEWAL (OUTPATIENT)
Age: 79
End: 2019-02-07

## 2019-02-08 ENCOUNTER — CHART COPY (OUTPATIENT)
Age: 79
End: 2019-02-08

## 2019-02-26 ENCOUNTER — APPOINTMENT (OUTPATIENT)
Dept: INTERNAL MEDICINE | Facility: CLINIC | Age: 79
End: 2019-02-26

## 2019-03-12 ENCOUNTER — APPOINTMENT (OUTPATIENT)
Dept: ORTHOPEDIC SURGERY | Facility: CLINIC | Age: 79
End: 2019-03-12

## 2019-03-18 ENCOUNTER — RX RENEWAL (OUTPATIENT)
Age: 79
End: 2019-03-18

## 2019-04-09 ENCOUNTER — RX RENEWAL (OUTPATIENT)
Age: 79
End: 2019-04-09

## 2019-04-17 NOTE — PROVIDER CONTACT NOTE (CRITICAL VALUE NOTIFICATION) - RECOMMENDATIONS
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   ICD 10 Code: C50.411     was seen today for an on treatment visit.  She is receiving radiation therapy to the right chest wall and surrounding lymphatics. She  has received 1080 cGy in 6 fractions out of a planned dose of 6040 cGy in 33 fractions. She is not receiving concurrent chemotherapy.     Today on exam the patient is tolerating radiation therapy well and has no new disease or treatment-related complaints.                                             Vitals:     Vitals:    04/17/19 0919   BP: 120/77   Pulse: 90   Resp: 16   Temp: 98.2 °F (36.8 °C)       Weight:   Wt Readings from Last 3 Encounters:   04/17/19 66.1 kg (145 lb 12.8 oz)   04/10/19 66.2 kg (146 lb)   03/14/19 68.5 kg (151 lb)      Pain:    Pain Score    04/17/19 0919   PainSc: 0-No pain         Plan: We plan to continue radiation therapy as prescribed.    Claudio Amezquita MD  Radiation Oncology    Electronically signed by Claudio Amezquita MD 4/17/2019  9:28 AM     cc: Dr. Hailey Cunningham, CICI                        
As per Joellen CAMPOS, repeat blood cx this morning. TBD
Contact provider
informed MD

## 2019-04-25 ENCOUNTER — LABORATORY RESULT (OUTPATIENT)
Age: 79
End: 2019-04-25

## 2019-04-25 ENCOUNTER — APPOINTMENT (OUTPATIENT)
Dept: INTERNAL MEDICINE | Facility: CLINIC | Age: 79
End: 2019-04-25
Payer: MEDICARE

## 2019-04-25 PROCEDURE — 36415 COLL VENOUS BLD VENIPUNCTURE: CPT

## 2019-04-26 LAB
25(OH)D3 SERPL-MCNC: 29.5 NG/ML
ALBUMIN SERPL ELPH-MCNC: 3 G/DL
ALP BLD-CCNC: 176 U/L
ALT SERPL-CCNC: 12 U/L
ANION GAP SERPL CALC-SCNC: 15 MMOL/L
APPEARANCE: CLEAR
AST SERPL-CCNC: 18 U/L
BASOPHILS # BLD AUTO: 0.04 K/UL
BASOPHILS NFR BLD AUTO: 0.6 %
BILIRUB SERPL-MCNC: <0.2 MG/DL
BILIRUBIN URINE: NEGATIVE
BLOOD URINE: NEGATIVE
BUN SERPL-MCNC: 15 MG/DL
CALCIUM SERPL-MCNC: 9.3 MG/DL
CHLORIDE SERPL-SCNC: 101 MMOL/L
CHOLEST SERPL-MCNC: 143 MG/DL
CHOLEST/HDLC SERPL: 3 RATIO
CO2 SERPL-SCNC: 20 MMOL/L
COLOR: ABNORMAL
CREAT SERPL-MCNC: 0.55 MG/DL
EOSINOPHIL # BLD AUTO: 0.13 K/UL
EOSINOPHIL NFR BLD AUTO: 2 %
GLUCOSE QUALITATIVE U: NEGATIVE
GLUCOSE SERPL-MCNC: 86 MG/DL
HCT VFR BLD CALC: 32.8 %
HDLC SERPL-MCNC: 48 MG/DL
HGB BLD-MCNC: 9.4 G/DL
IMM GRANULOCYTES NFR BLD AUTO: 0.3 %
KETONES URINE: NEGATIVE
LDLC SERPL CALC-MCNC: 84 MG/DL
LEUKOCYTE ESTERASE URINE: NEGATIVE
LYMPHOCYTES # BLD AUTO: 1.45 K/UL
LYMPHOCYTES NFR BLD AUTO: 22 %
MAN DIFF?: NORMAL
MCHC RBC-ENTMCNC: 26.2 PG
MCHC RBC-ENTMCNC: 28.7 GM/DL
MCV RBC AUTO: 91.4 FL
MONOCYTES # BLD AUTO: 0.77 K/UL
MONOCYTES NFR BLD AUTO: 11.7 %
NEUTROPHILS # BLD AUTO: 4.19 K/UL
NEUTROPHILS NFR BLD AUTO: 63.4 %
NITRITE URINE: NEGATIVE
PH URINE: 6
PLATELET # BLD AUTO: 588 K/UL
POTASSIUM SERPL-SCNC: 4.6 MMOL/L
PROT SERPL-MCNC: 7.9 G/DL
PROTEIN URINE: ABNORMAL
RBC # BLD: 3.59 M/UL
RBC # FLD: 17.8 %
SODIUM SERPL-SCNC: 136 MMOL/L
SPECIFIC GRAVITY URINE: 1.03
T4 SERPL-MCNC: 6.2 UG/DL
TRIGL SERPL-MCNC: 55 MG/DL
TSH SERPL-ACNC: 1.8 UIU/ML
UROBILINOGEN URINE: ABNORMAL
WBC # FLD AUTO: 6.6 K/UL

## 2019-04-30 ENCOUNTER — APPOINTMENT (OUTPATIENT)
Dept: INTERNAL MEDICINE | Facility: CLINIC | Age: 79
End: 2019-04-30
Payer: MEDICARE

## 2019-04-30 ENCOUNTER — NON-APPOINTMENT (OUTPATIENT)
Age: 79
End: 2019-04-30

## 2019-04-30 ENCOUNTER — LABORATORY RESULT (OUTPATIENT)
Age: 79
End: 2019-04-30

## 2019-04-30 VITALS
RESPIRATION RATE: 16 BRPM | SYSTOLIC BLOOD PRESSURE: 120 MMHG | HEART RATE: 90 BPM | DIASTOLIC BLOOD PRESSURE: 65 MMHG | HEIGHT: 66 IN | BODY MASS INDEX: 25.71 KG/M2 | WEIGHT: 160 LBS

## 2019-04-30 DIAGNOSIS — R31.29 OTHER MICROSCOPIC HEMATURIA: ICD-10-CM

## 2019-04-30 PROCEDURE — 99497 ADVNCD CARE PLAN 30 MIN: CPT | Mod: 33

## 2019-04-30 PROCEDURE — G0444 DEPRESSION SCREEN ANNUAL: CPT | Mod: 59

## 2019-04-30 PROCEDURE — G0439: CPT

## 2019-04-30 PROCEDURE — 99214 OFFICE O/P EST MOD 30 MIN: CPT | Mod: 25

## 2019-04-30 PROCEDURE — 93000 ELECTROCARDIOGRAM COMPLETE: CPT

## 2019-04-30 RX ORDER — NAPROXEN 500 MG/1
500 TABLET ORAL
Qty: 60 | Refills: 2 | Status: DISCONTINUED | COMMUNITY
Start: 2018-10-31 | End: 2019-04-30

## 2019-04-30 RX ORDER — CHROMIUM 200 MCG
1000 TABLET ORAL DAILY
Qty: 90 | Refills: 1 | Status: ACTIVE | COMMUNITY
Start: 2019-04-30

## 2019-04-30 NOTE — PHYSICAL EXAM
[No Acute Distress] : no acute distress [Well Nourished] : well nourished [Well Developed] : well developed [Well-Appearing] : well-appearing [Normal Sclera/Conjunctiva] : normal sclera/conjunctiva [PERRL] : pupils equal round and reactive to light [EOMI] : extraocular movements intact [Normal Outer Ear/Nose] : the outer ears and nose were normal in appearance [Normal Oropharynx] : the oropharynx was normal [Normal TMs] : both tympanic membranes were normal [No JVD] : no jugular venous distention [Supple] : supple [No Lymphadenopathy] : no lymphadenopathy [Thyroid Normal, No Nodules] : the thyroid was normal and there were no nodules present [No Respiratory Distress] : no respiratory distress  [Clear to Auscultation] : lungs were clear to auscultation bilaterally [No Accessory Muscle Use] : no accessory muscle use [Normal Rate] : normal rate  [Regular Rhythm] : with a regular rhythm [Normal S1, S2] : normal S1 and S2 [No Murmur] : no murmur heard [No Carotid Bruits] : no carotid bruits [No Abdominal Bruit] : a ~M bruit was not heard ~T in the abdomen [No Varicosities] : no varicosities [Pedal Pulses Present] : the pedal pulses are present [No Edema] : there was no peripheral edema [No Extremity Clubbing/Cyanosis] : no extremity clubbing/cyanosis [No Palpable Aorta] : no palpable aorta [Normal Appearance] : normal in appearance [No Nipple Discharge] : no nipple discharge [No Axillary Lymphadenopathy] : no axillary lymphadenopathy [Soft] : abdomen soft [Non Tender] : non-tender [Non-distended] : non-distended [No Masses] : no abdominal mass palpated [No HSM] : no HSM [Normal Bowel Sounds] : normal bowel sounds [Normal Supraclavicular Nodes] : no supraclavicular lymphadenopathy [Normal Axillary Nodes] : no axillary lymphadenopathy [Normal Posterior Cervical Nodes] : no posterior cervical lymphadenopathy [Normal Anterior Cervical Nodes] : no anterior cervical lymphadenopathy [No CVA Tenderness] : no CVA  tenderness [No Spinal Tenderness] : no spinal tenderness [Grossly Normal Strength/Tone] : grossly normal strength/tone [No Rash] : no rash [Normal Gait] : normal gait [Coordination Grossly Intact] : coordination grossly intact [No Focal Deficits] : no focal deficits [Deep Tendon Reflexes (DTR)] : deep tendon reflexes were 2+ and symmetric [Speech Grossly Normal] : speech grossly normal [Memory Grossly Normal] : memory grossly normal [Normal Affect] : the affect was normal [Alert and Oriented x3] : oriented to person, place, and time [Normal Mood] : the mood was normal [Normal Insight/Judgement] : insight and judgment were intact [FreeTextEntry1] : Deferred to GYN [de-identified] : Deferred to GYN [de-identified] : Warm knees

## 2019-04-30 NOTE — ASSESSMENT
[FreeTextEntry1] : Pt with above medical issues which requires extra work in addition to the well visit.\par Pt with chronic knee infections  -on suppressive antibiotics.\par Labs reviewed\par Meds adjusted.\par Repeat UA and cytology\par To take Vitamin D 1000 units per day\par \par

## 2019-04-30 NOTE — REVIEW OF SYSTEMS
[Hearing Loss] : hearing loss [Negative] : Heme/Lymph [FreeTextEntry3] : last optho - 7/18 [FreeTextEntry9] : see HPI [de-identified] : see HPI

## 2019-04-30 NOTE — ED PROVIDER NOTE - NSCAREINITIATED _GEN_ER
OSWESTRY DISABILITY QUESTIONNAIRE  This questionnaire has been designed to give us information as to how your back or leg pain is affecting your ability to manage in everyday life. Please answer by checking one box in each section for the statement which best applies to you. We realize you may consider that tow or more statements in any one section apply but please just check the box that indicated the statement that most clearly describes your problem.       Section 1: Pain Intensity    [] 0. I have no pain at the moment    [] 1. The pain is very mild at the moment     [x] 2. The pain is moderate at the moment     [] 3. The pain is fairly severe at the moment     [] 4. The pain is very severe at the moment    [] 5. The pain is the worst imaginable at the moment  Section 6: Standing     [] 0. I can stand as long as I want without extra pain    [x] 1. I can stand as long as I want but it gives me extra pain    [] 2. Pain prevents me from standing for more than 1 hour     [] 3. Pain prevents me from standing for more than 30    minutes      [] 4. Pain prevents me from standing for more than 10   minutes     [] 5. Pain prevents me from standing at all        Section 2: Personal Care    [x] 0. I can look after myself normally without causing extra pain  [] 1. I can look after myself normally but causes extra pain    [] 2. It is painful to look after myself and I am slow and careful     [] 3. I need some help but can manage most of my     personal care     [] 4. I need help every day in most aspects of self-care     [] 5. I do not get dressed, wash with difficulty and stay in bed      Section 7: Sleeping    [] 0. My sleep is never disturbed by     [x] 1. My sleep is occasionally disturbed by pain    [] 2. Because of pain I have less than 6 hours of sleep     [] 3. Because of pain I have less than 4 hours of sleep     [] 4. Because of pain I have less than 2 hours of sleep    [] 5. Pain prevents me from sleeping at  , Amanda SIERRA(Attending) all.      Section 3: Lifting      [x] 0. I can lift heavy weights without extra pain    [] 1. I can lift heavy weights but it gives me extra pain     [] 2. Pain prevents me lifting heavy weights off the floor, but I can manage if they are conveniently placed. (e.g.On a table)    [] 3. Pain prevents me lifting heavy weights but I can   manage light to medium weights if they are                  conveniently positioned     [] 4. I can only lift very light weights     [] 5. I cannot lift or ayana anything     Section 8: Sex Life (if applicable)    [x] 0. My sex life is normaly and causes no extra pain     [] 1. My sex life is normal but causes some extra pain    [] 2. My sex life is nearly normal but is very painful     [] 3. My sex life is severely restricted by pain    [] 4. My sex life is nearly absent because of pain    [] 5. Pain prevents any sex life at all     Section 4: Walking    [] 0. Pain does not prevent me walking any distance     [] 1. Pain prevents me from walking more than 1.2 miles     [x] 2. Pain prevents me from waking more than 0.6 miles     [] 3. Pain prevents me from walking more than 0.3 miles     [] 4. I can only walk using a stick or crutches     [] 5. I am in bed most of the time   Section 9: Social Life     [x] 0. My social life is normal and gives me no extra pain    [] 1. My social life is normal but increases the degree of pain    [] 2. Pain has no significant effect on my social life apart  from limiting my more energetic interests                          (e.g.Sports)    [] 3. Pain has restricted my social life and I do not go out as often.    [] 4. Pain has restricted my social life to my home.    [] 5. I have no social life because of pain       Section 5: Sitting     [x] 0. I can sit in any chair as long as I like     [] 1. I can only sit in my favorite chair as long as I like     [] 2. Pain prevents me from sitting more than one hour     [] 3. Pain prevents me from sitting more than  30 minutes     [] 4. Pain prevents me from sitting more than 10 minutes     [] 5. Pain prevents me from sitting at all    Score Index 12%.   Section 10: Traveling    [x] 0. I can travel anywhere without pain    [] 1. I can travel anywhere but it gives me extra pain    [] 2. Pain is bad but I manage journeys over 2 hours     [] 3. Pain restricts me to journeys less than 1 hour    [] 4. Pain restricts me to short necessary journeys less than 20 minutes    [] 5. Pain prevents me from travelling except to receive treatment.

## 2019-04-30 NOTE — HEALTH RISK ASSESSMENT
[Fair] :  ~his/her~ mood as fair [No falls in past year] : Patient reported no falls in the past year [None] : None [With Family] : lives with family [Retired] : retired [] :  [Feels Safe at Home] : Feels safe at home [Fully functional (bathing, dressing, toileting, transferring, walking, feeding)] : Fully functional (bathing, dressing, toileting, transferring, walking, feeding) [Fully functional (using the telephone, shopping, preparing meals, housekeeping, doing laundry, using] : Fully functional and needs no help or supervision to perform IADLs (using the telephone, shopping, preparing meals, housekeeping, doing laundry, using transportation, managing medications and managing finances) [Smoke Detector] : smoke detector [Carbon Monoxide Detector] : carbon monoxide detector [Seat Belt] :  uses seat belt [Sunscreen] : uses sunscreen [With Patient/Caregiver] : With Patient/Caregiver [] : No [de-identified] : Walking [de-identified] : Low fats [AdvancecareDate] : 04/19 [FreeTextEntry4] : Had long discussion with the patient.\par Discussed with pt the need for a health care proxy.\par Discussed who can be a proxy, forms given if needed, and the need for the proxy to know their wishes and to make sure they will comply.\par The proxy will need to have a copy in their home and the patient should have a copy.\par \par

## 2019-05-02 ENCOUNTER — RX RENEWAL (OUTPATIENT)
Age: 79
End: 2019-05-02

## 2019-05-02 LAB
APPEARANCE: ABNORMAL
BACTERIA: NEGATIVE
BILIRUBIN URINE: NEGATIVE
BLOOD URINE: ABNORMAL
CALCIUM OXALATE CRYSTALS: ABNORMAL
COLOR: YELLOW
GLUCOSE QUALITATIVE U: NEGATIVE
HYALINE CASTS: 0 /LPF
KETONES URINE: NEGATIVE
LEUKOCYTE ESTERASE URINE: ABNORMAL
MICROSCOPIC-UA: NORMAL
NITRITE URINE: NEGATIVE
PH URINE: 8.5
PROTEIN URINE: ABNORMAL
RED BLOOD CELLS URINE: 100 /HPF
SPECIFIC GRAVITY URINE: 1.01
SQUAMOUS EPITHELIAL CELLS: 10 /HPF
UROBILINOGEN URINE: NORMAL
WHITE BLOOD CELLS URINE: 200 /HPF

## 2019-05-03 LAB — URINE CYTOLOGY: NORMAL

## 2019-05-06 LAB
APPEARANCE: CLEAR
BACTERIA UR CULT: ABNORMAL
BACTERIA: NEGATIVE
BILIRUBIN URINE: NEGATIVE
BLOOD URINE: NORMAL
CALCIUM OXALATE CRYSTALS: ABNORMAL
COLOR: YELLOW
GLUCOSE QUALITATIVE U: NEGATIVE
HYALINE CASTS: 1 /LPF
KETONES URINE: NEGATIVE
LEUKOCYTE ESTERASE URINE: NEGATIVE
MICROSCOPIC-UA: NORMAL
NITRITE URINE: NEGATIVE
PH URINE: 6
PROTEIN URINE: NORMAL
RED BLOOD CELLS URINE: 7 /HPF
SPECIFIC GRAVITY URINE: 1.03
SQUAMOUS EPITHELIAL CELLS: 2 /HPF
URINE CYTOLOGY: NORMAL
UROBILINOGEN URINE: NORMAL
WHITE BLOOD CELLS URINE: 2 /HPF

## 2019-05-29 ENCOUNTER — MEDICATION RENEWAL (OUTPATIENT)
Age: 79
End: 2019-05-29

## 2019-06-10 ENCOUNTER — RX RENEWAL (OUTPATIENT)
Age: 79
End: 2019-06-10

## 2019-07-11 ENCOUNTER — MEDICATION RENEWAL (OUTPATIENT)
Age: 79
End: 2019-07-11

## 2019-07-31 ENCOUNTER — MEDICATION RENEWAL (OUTPATIENT)
Age: 79
End: 2019-07-31

## 2019-08-15 ENCOUNTER — APPOINTMENT (OUTPATIENT)
Dept: INTERNAL MEDICINE | Facility: CLINIC | Age: 79
End: 2019-08-15
Payer: MEDICARE

## 2019-08-15 DIAGNOSIS — R59.1 GENERALIZED ENLARGED LYMPH NODES: ICD-10-CM

## 2019-08-15 PROCEDURE — 36415 COLL VENOUS BLD VENIPUNCTURE: CPT

## 2019-08-15 PROCEDURE — 99213 OFFICE O/P EST LOW 20 MIN: CPT | Mod: 25

## 2019-08-15 RX ORDER — HYDROCODONE BITARTRATE AND ACETAMINOPHEN 5; 325 MG/1; MG/1
5-325 TABLET ORAL
Qty: 60 | Refills: 0 | Status: DISCONTINUED | COMMUNITY
Start: 2019-05-07

## 2019-08-15 RX ORDER — MUPIROCIN 20 MG/G
2 OINTMENT TOPICAL
Qty: 22 | Refills: 0 | Status: DISCONTINUED | COMMUNITY
Start: 2019-08-13

## 2019-08-15 NOTE — HISTORY OF PRESENT ILLNESS
[FreeTextEntry1] : Noticed lumps in groin.\par Being treated for infected prosthetic knees - likely to undergo surgery

## 2019-08-15 NOTE — PHYSICAL EXAM
[Well Nourished] : well nourished [No Acute Distress] : no acute distress [Well Developed] : well developed [Well-Appearing] : well-appearing [Normal Sclera/Conjunctiva] : normal sclera/conjunctiva [Soft] : abdomen soft [No HSM] : no HSM [Normal Supraclavicular Nodes] : no supraclavicular lymphadenopathy [Normal Axillary Nodes] : no axillary lymphadenopathy [Normal Posterior Cervical Nodes] : no posterior cervical lymphadenopathy [Inguinal Lymph Nodes Enlarged Bilaterally] : enlarged nodes bilaterally [Normal Anterior Cervical Nodes] : no anterior cervical lymphadenopathy

## 2019-08-15 NOTE — ASSESSMENT
[FreeTextEntry1] : Pt with bilateral inguinal LN's  -likely secondary -to infected knees and drainage area.\par To check labs and then CTT of chest and abdomen and pelvis.\par To fu with ID

## 2019-08-16 LAB
ALBUMIN SERPL ELPH-MCNC: 3.3 G/DL
ALP BLD-CCNC: 168 U/L
ALT SERPL-CCNC: 10 U/L
ANION GAP SERPL CALC-SCNC: 17 MMOL/L
AST SERPL-CCNC: 14 U/L
BASOPHILS # BLD AUTO: 0.05 K/UL
BASOPHILS NFR BLD AUTO: 0.7 %
BILIRUB SERPL-MCNC: <0.2 MG/DL
BUN SERPL-MCNC: 15 MG/DL
CALCIUM SERPL-MCNC: 9.2 MG/DL
CHLORIDE SERPL-SCNC: 102 MMOL/L
CO2 SERPL-SCNC: 19 MMOL/L
CREAT SERPL-MCNC: 0.59 MG/DL
EOSINOPHIL # BLD AUTO: 0.11 K/UL
EOSINOPHIL NFR BLD AUTO: 1.5 %
GLUCOSE SERPL-MCNC: 114 MG/DL
HCT VFR BLD CALC: 33.2 %
HGB BLD-MCNC: 9.8 G/DL
IMM GRANULOCYTES NFR BLD AUTO: 0.4 %
IRON SATN MFR SERPL: 11 %
IRON SERPL-MCNC: 32 UG/DL
LDH SERPL-CCNC: 201 U/L
LYMPHOCYTES # BLD AUTO: 1.31 K/UL
LYMPHOCYTES NFR BLD AUTO: 18.4 %
MAN DIFF?: NORMAL
MCHC RBC-ENTMCNC: 26 PG
MCHC RBC-ENTMCNC: 29.5 GM/DL
MCV RBC AUTO: 88.1 FL
MONOCYTES # BLD AUTO: 0.67 K/UL
MONOCYTES NFR BLD AUTO: 9.4 %
NEUTROPHILS # BLD AUTO: 4.95 K/UL
NEUTROPHILS NFR BLD AUTO: 69.6 %
PLATELET # BLD AUTO: 556 K/UL
POTASSIUM SERPL-SCNC: 5.3 MMOL/L
PROT SERPL-MCNC: 7.4 G/DL
RBC # BLD: 3.77 M/UL
RBC # FLD: 17 %
SODIUM SERPL-SCNC: 138 MMOL/L
TIBC SERPL-MCNC: 297 UG/DL
UIBC SERPL-MCNC: 265 UG/DL
WBC # FLD AUTO: 7.12 K/UL

## 2019-08-19 ENCOUNTER — APPOINTMENT (OUTPATIENT)
Dept: CT IMAGING | Facility: CLINIC | Age: 79
End: 2019-08-19

## 2019-08-21 ENCOUNTER — RX RENEWAL (OUTPATIENT)
Age: 79
End: 2019-08-21

## 2019-09-16 ENCOUNTER — RX RENEWAL (OUTPATIENT)
Age: 79
End: 2019-09-16

## 2019-10-23 ENCOUNTER — INBOUND DOCUMENT (OUTPATIENT)
Age: 79
End: 2019-10-23

## 2019-10-23 ENCOUNTER — OTHER (OUTPATIENT)
Age: 79
End: 2019-10-23

## 2019-10-30 ENCOUNTER — INBOUND DOCUMENT (OUTPATIENT)
Age: 79
End: 2019-10-30

## 2019-11-02 ENCOUNTER — INPATIENT (INPATIENT)
Facility: HOSPITAL | Age: 79
LOS: 2 days | Discharge: HOME | DRG: 950 | End: 2019-11-05
Attending: INTERNAL MEDICINE | Admitting: STUDENT IN AN ORGANIZED HEALTH CARE EDUCATION/TRAINING PROGRAM
Payer: MEDICARE

## 2019-11-02 VITALS
OXYGEN SATURATION: 97 % | HEART RATE: 88 BPM | DIASTOLIC BLOOD PRESSURE: 76 MMHG | SYSTOLIC BLOOD PRESSURE: 154 MMHG | TEMPERATURE: 98 F | RESPIRATION RATE: 16 BRPM

## 2019-11-02 DIAGNOSIS — M25.569 PAIN IN UNSPECIFIED KNEE: ICD-10-CM

## 2019-11-02 PROCEDURE — 71045 X-RAY EXAM CHEST 1 VIEW: CPT | Mod: 26

## 2019-11-02 PROCEDURE — 99284 EMERGENCY DEPT VISIT MOD MDM: CPT | Mod: GC

## 2019-11-02 RX ORDER — CEFAZOLIN SODIUM 1 G
1000 VIAL (EA) INJECTION EVERY 8 HOURS
Refills: 0 | Status: DISCONTINUED | OUTPATIENT
Start: 2019-11-03 | End: 2019-11-04

## 2019-11-02 RX ORDER — CEFAZOLIN SODIUM 1 G
1000 VIAL (EA) INJECTION ONCE
Refills: 0 | Status: COMPLETED | OUTPATIENT
Start: 2019-11-02 | End: 2019-11-03

## 2019-11-02 RX ORDER — CEFAZOLIN SODIUM 1 G
VIAL (EA) INJECTION
Refills: 0 | Status: DISCONTINUED | OUTPATIENT
Start: 2019-11-03 | End: 2019-11-04

## 2019-11-02 NOTE — ED PROVIDER NOTE - CLINICAL SUMMARY MEDICAL DECISION MAKING FREE TEXT BOX
Pt is here for admission to be sent to [facility] again. No acute complains. Social work and admission. Call [facility] for medication list.

## 2019-11-02 NOTE — ED ADULT NURSE NOTE - NSIMPLEMENTINTERV_GEN_ALL_ED
Implemented All Fall Risk Interventions:  [place] to call system. Call bell, personal items and telephone within reach. Instruct patient to call for assistance. Room bathroom lighting operational. Non-slip footwear when patient is off stretcher. Physically safe environment: no spills, clutter or unnecessary equipment. Stretcher in lowest position, wheels locked, appropriate side rails in place. Provide visual cue, wrist band, yellow gown, etc. Monitor gait and stability. Monitor for mental status changes and reorient to person, place, and time. Review medications for side effects contributing to fall risk. Reinforce activity limits and safety measures with patient and family.

## 2019-11-02 NOTE — ED ADULT NURSE NOTE - OBJECTIVE STATEMENT
78 y/o female PMH anxiety, depression, gout and migraines presents to ED via EMS reporting refusal to leave rehab. Per pt, pt had bilateral knee replacements on 10/7 with PICC line in L arm for IV abx. Pt reports was in [facility] rehab and pt thought she had a day pass yesterday to meet with  but when pt returned to rehab today she was told she signed papers to leave against medical advice. Pt reports she tried to call an uber but wifi did not work so nurse called EMS on pt. On exam, AOx3, speaking in complete sentences. Lung sounds CTA, NAD. Abdomen soft, non-tender, non-distended. Pt declines changing into gown. Awaiting evaluation by MD. Pt requesting to be discharged home, reports able to function at home independently.

## 2019-11-02 NOTE — ED PROVIDER NOTE - PROGRESS NOTE DETAILS
Resident Darren: discussed case with on call social work - she stated that she called [facility] - needs admission to restart the process as patient had been d/c from facility. Resident Darren Antonio: Discussed the case with Dr. Armijo - agrees with admission.

## 2019-11-02 NOTE — ED PROVIDER NOTE - OBJECTIVE STATEMENT
78 yo F with hx of Sjogren's syndrome, HTN, HLD, BL TKA (2008 with Dr. Dorado, S) and depression BIBEMS for   prosthetic joint infection, growing Gram + cocci.  Irrigation and debridement of knee with replacement of polyethylene liner 80 yo F with hx of Sjogren's syndrome, HTN, HLD, BL TKA (2008 with Dr. Dorado, S) and depression BIBEMS for   2018: prosthetic joint infection, growing Gram + cocci.  Irrigation and debridement of knee with replacement of polyethylene liner 78 yo F with hx of Sjogren's syndrome, HTN, HLD, BL TKA (2008 with Dr. Dorado, [facility]") and depression BIBEMS from [facility] rehab. No acute complains. Pt had b/l knee revision in special surgery hospital in [facility]. Was receiving antibiotics via PICC line. 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 However, when she came back she was told she signed herself AMA in the morning and can be admitted back to the facility. 911 was called, sent to hospital for admission and re-admission to [facility]. Denies fever, chills, nausea, vomiting, diarrhea, chest pain, shortness of breath, palpitations, cough.

## 2019-11-02 NOTE — ED PROVIDER NOTE - PHYSICAL EXAMINATION
Gen: well developed and well nourished, NAD  ENT: airway patent, mmm  CV: RRR, +S1/S2, no M/R/G  Resp: CTAB, symmetric breath sounds, no W/R/R  GI: abdomen soft non-distended, NTTP  Extremities - +b/l knee swelling  Neuro: A&Ox3, following commands, speech clear, moving all four extremities spontaneously  Psych: appropriate mood, normal insight

## 2019-11-02 NOTE — ED PROVIDER NOTE - ATTENDING CONTRIBUTION TO CARE
I have seen and evaluated this patient with the resident.   I agree with the findings  unless other wise stated.  I have made appropriate changes in documentations where needed, After my face to face bedside evaluation, I am further  notin yo F with hx of Sjogren's syndrome, HTN, HLD, BL TKA ( with Dr. Dorado, [facility]) and depression BIBEMS from [facility] rehab. No acute complains. Pt had b/l knee revision in special surgery hospital in [facility]. Was receiving antibiotics via PICC line. 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 However, when she came back she was told she signed herself AMA in the morning and can be admitted back to the facility. 911 was called, sent to hospital for admission and re-admission to [facility]. Denies fever, chills, nausea, vomiting, diarrhea, chest pain, shortness of breath, palpitations, cough. Pt undergoing active treatment at rehab center for infected joint with intravenous antibiotics and active PT following knee replacement surgery needs to continue to have those treatments will admit patient --Luis Miguel

## 2019-11-02 NOTE — ED ADULT TRIAGE NOTE - CHIEF COMPLAINT QUOTE
pt went out from rehab and rehab told her she sign out  gave her all belonging and sent here  pt has picc l arm

## 2019-11-03 DIAGNOSIS — T84.50XA INFECTION AND INFLAMMATORY REACTION DUE TO UNSPECIFIED INTERNAL JOINT PROSTHESIS, INITIAL ENCOUNTER: ICD-10-CM

## 2019-11-03 DIAGNOSIS — Z29.9 ENCOUNTER FOR PROPHYLACTIC MEASURES, UNSPECIFIED: ICD-10-CM

## 2019-11-03 DIAGNOSIS — I10 ESSENTIAL (PRIMARY) HYPERTENSION: ICD-10-CM

## 2019-11-03 DIAGNOSIS — F41.9 ANXIETY DISORDER, UNSPECIFIED: ICD-10-CM

## 2019-11-03 DIAGNOSIS — M00.9 PYOGENIC ARTHRITIS, UNSPECIFIED: ICD-10-CM

## 2019-11-03 DIAGNOSIS — E78.5 HYPERLIPIDEMIA, UNSPECIFIED: ICD-10-CM

## 2019-11-03 DIAGNOSIS — R26.9 UNSPECIFIED ABNORMALITIES OF GAIT AND MOBILITY: ICD-10-CM

## 2019-11-03 DIAGNOSIS — Z02.9 ENCOUNTER FOR ADMINISTRATIVE EXAMINATIONS, UNSPECIFIED: ICD-10-CM

## 2019-11-03 LAB
ANION GAP SERPL CALC-SCNC: 9 MMOL/L — SIGNIFICANT CHANGE UP (ref 5–17)
BUN SERPL-MCNC: 12 MG/DL — SIGNIFICANT CHANGE UP (ref 7–23)
CALCIUM SERPL-MCNC: 8.3 MG/DL — LOW (ref 8.4–10.5)
CHLORIDE SERPL-SCNC: 103 MMOL/L — SIGNIFICANT CHANGE UP (ref 96–108)
CO2 SERPL-SCNC: 24 MMOL/L — SIGNIFICANT CHANGE UP (ref 22–31)
CREAT SERPL-MCNC: 0.57 MG/DL — SIGNIFICANT CHANGE UP (ref 0.5–1.3)
GLUCOSE SERPL-MCNC: 89 MG/DL — SIGNIFICANT CHANGE UP (ref 70–99)
HCT VFR BLD CALC: 28.3 % — LOW (ref 34.5–45)
HGB BLD-MCNC: 8.7 G/DL — LOW (ref 11.5–15.5)
MAGNESIUM SERPL-MCNC: 2 MG/DL — SIGNIFICANT CHANGE UP (ref 1.6–2.6)
MCHC RBC-ENTMCNC: 27.3 PG — SIGNIFICANT CHANGE UP (ref 27–34)
MCHC RBC-ENTMCNC: 30.7 GM/DL — LOW (ref 32–36)
MCV RBC AUTO: 88.7 FL — SIGNIFICANT CHANGE UP (ref 80–100)
PHOSPHATE SERPL-MCNC: 3.6 MG/DL — SIGNIFICANT CHANGE UP (ref 2.5–4.5)
PLATELET # BLD AUTO: 325 K/UL — SIGNIFICANT CHANGE UP (ref 150–400)
POTASSIUM SERPL-MCNC: 3.9 MMOL/L — SIGNIFICANT CHANGE UP (ref 3.5–5.3)
POTASSIUM SERPL-SCNC: 3.9 MMOL/L — SIGNIFICANT CHANGE UP (ref 3.5–5.3)
RBC # BLD: 3.19 M/UL — LOW (ref 3.8–5.2)
RBC # FLD: 17.4 % — HIGH (ref 10.3–14.5)
SODIUM SERPL-SCNC: 136 MMOL/L — SIGNIFICANT CHANGE UP (ref 135–145)
WBC # BLD: 3.34 K/UL — LOW (ref 3.8–10.5)
WBC # FLD AUTO: 3.34 K/UL — LOW (ref 3.8–10.5)

## 2019-11-03 PROCEDURE — 99223 1ST HOSP IP/OBS HIGH 75: CPT

## 2019-11-03 RX ORDER — CALCIUM CARBONATE 500(1250)
1 TABLET ORAL
Qty: 0 | Refills: 0 | DISCHARGE

## 2019-11-03 RX ORDER — ATORVASTATIN CALCIUM 80 MG/1
1 TABLET, FILM COATED ORAL
Qty: 0 | Refills: 0 | DISCHARGE

## 2019-11-03 RX ORDER — CLONAZEPAM 1 MG
1 TABLET ORAL
Qty: 0 | Refills: 0 | DISCHARGE

## 2019-11-03 RX ORDER — CLONAZEPAM 1 MG
0.5 TABLET ORAL
Refills: 0 | Status: DISCONTINUED | OUTPATIENT
Start: 2019-11-03 | End: 2019-11-05

## 2019-11-03 RX ORDER — PANTOPRAZOLE SODIUM 20 MG/1
40 TABLET, DELAYED RELEASE ORAL
Refills: 0 | Status: DISCONTINUED | OUTPATIENT
Start: 2019-11-03 | End: 2019-11-05

## 2019-11-03 RX ORDER — SENNA PLUS 8.6 MG/1
2 TABLET ORAL AT BEDTIME
Refills: 0 | Status: DISCONTINUED | OUTPATIENT
Start: 2019-11-03 | End: 2019-11-05

## 2019-11-03 RX ORDER — SENNA PLUS 8.6 MG/1
2 TABLET ORAL
Qty: 0 | Refills: 0 | DISCHARGE

## 2019-11-03 RX ORDER — QUETIAPINE FUMARATE 200 MG/1
25 TABLET, FILM COATED ORAL DAILY
Refills: 0 | Status: DISCONTINUED | OUTPATIENT
Start: 2019-11-03 | End: 2019-11-05

## 2019-11-03 RX ORDER — RIVAROXABAN 15 MG-20MG
10 KIT ORAL DAILY
Refills: 0 | Status: DISCONTINUED | OUTPATIENT
Start: 2019-11-03 | End: 2019-11-05

## 2019-11-03 RX ORDER — PANTOPRAZOLE SODIUM 20 MG/1
1 TABLET, DELAYED RELEASE ORAL
Qty: 0 | Refills: 0 | DISCHARGE

## 2019-11-03 RX ORDER — ALPRAZOLAM 0.25 MG
1 TABLET ORAL
Qty: 0 | Refills: 0 | DISCHARGE

## 2019-11-03 RX ORDER — LORATADINE 10 MG/1
10 TABLET ORAL ONCE
Refills: 0 | Status: COMPLETED | OUTPATIENT
Start: 2019-11-03 | End: 2019-11-04

## 2019-11-03 RX ORDER — ACETAMINOPHEN 500 MG
650 TABLET ORAL EVERY 6 HOURS
Refills: 0 | Status: DISCONTINUED | OUTPATIENT
Start: 2019-11-03 | End: 2019-11-05

## 2019-11-03 RX ORDER — DOCUSATE SODIUM 100 MG
1 CAPSULE ORAL
Qty: 0 | Refills: 0 | DISCHARGE

## 2019-11-03 RX ORDER — FUROSEMIDE 40 MG
1 TABLET ORAL
Qty: 0 | Refills: 0 | DISCHARGE

## 2019-11-03 RX ORDER — ATORVASTATIN CALCIUM 80 MG/1
10 TABLET, FILM COATED ORAL AT BEDTIME
Refills: 0 | Status: DISCONTINUED | OUTPATIENT
Start: 2019-11-03 | End: 2019-11-05

## 2019-11-03 RX ORDER — OXYCODONE HYDROCHLORIDE 5 MG/1
5 TABLET ORAL EVERY 4 HOURS
Refills: 0 | Status: DISCONTINUED | OUTPATIENT
Start: 2019-11-03 | End: 2019-11-05

## 2019-11-03 RX ORDER — METOPROLOL TARTRATE 50 MG
25 TABLET ORAL
Refills: 0 | Status: DISCONTINUED | OUTPATIENT
Start: 2019-11-03 | End: 2019-11-05

## 2019-11-03 RX ORDER — ZOLPIDEM TARTRATE 10 MG/1
1 TABLET ORAL
Qty: 0 | Refills: 0 | DISCHARGE

## 2019-11-03 RX ORDER — BUTALBITAL/ACETAMINOPHEN 50MG-650MG
1 TABLET ORAL
Qty: 0 | Refills: 0 | DISCHARGE

## 2019-11-03 RX ADMIN — SENNA PLUS 2 TABLET(S): 8.6 TABLET ORAL at 22:51

## 2019-11-03 RX ADMIN — PANTOPRAZOLE SODIUM 40 MILLIGRAM(S): 20 TABLET, DELAYED RELEASE ORAL at 06:07

## 2019-11-03 RX ADMIN — OXYCODONE HYDROCHLORIDE 5 MILLIGRAM(S): 5 TABLET ORAL at 03:05

## 2019-11-03 RX ADMIN — OXYCODONE HYDROCHLORIDE 5 MILLIGRAM(S): 5 TABLET ORAL at 22:51

## 2019-11-03 RX ADMIN — OXYCODONE HYDROCHLORIDE 5 MILLIGRAM(S): 5 TABLET ORAL at 23:22

## 2019-11-03 RX ADMIN — Medication 25 MILLIGRAM(S): at 16:45

## 2019-11-03 RX ADMIN — Medication 100 MILLIGRAM(S): at 01:30

## 2019-11-03 RX ADMIN — Medication 100 MILLIGRAM(S): at 10:24

## 2019-11-03 RX ADMIN — RIVAROXABAN 10 MILLIGRAM(S): KIT at 12:30

## 2019-11-03 RX ADMIN — ATORVASTATIN CALCIUM 10 MILLIGRAM(S): 80 TABLET, FILM COATED ORAL at 22:51

## 2019-11-03 RX ADMIN — OXYCODONE HYDROCHLORIDE 5 MILLIGRAM(S): 5 TABLET ORAL at 02:05

## 2019-11-03 RX ADMIN — Medication 25 MILLIGRAM(S): at 06:06

## 2019-11-03 RX ADMIN — Medication 0.5 MILLIGRAM(S): at 02:13

## 2019-11-03 RX ADMIN — Medication 20 MILLIGRAM(S): at 12:30

## 2019-11-03 RX ADMIN — Medication 600 MILLIGRAM(S): at 16:46

## 2019-11-03 RX ADMIN — Medication 100 MILLIGRAM(S): at 16:44

## 2019-11-03 RX ADMIN — Medication 600 MILLIGRAM(S): at 06:06

## 2019-11-03 RX ADMIN — QUETIAPINE FUMARATE 25 MILLIGRAM(S): 200 TABLET, FILM COATED ORAL at 12:30

## 2019-11-03 NOTE — H&P ADULT - PROBLEM SELECTOR PLAN 2
pt with difficulty ambulating 2/2 MSSA knee infections   PT in AM, SW/CM to reestablish rehab   fall precautions

## 2019-11-03 NOTE — H&P ADULT - NSHPLABSRESULTS_GEN_ALL_CORE
Labs, imaging and EKG personally reviewed and interpreted by me. no significant lab findings, CXR negative for consolidation, prior EKG sinus with RBBB.                          8.4    3.15  )-----------( 300      ( 01 Nov 2019 11:01 )             27.5     11-01    135  |  101  |  11  ----------------------------<  105<H>  4.0   |  23  |  0.49<L>    Ca    8.4      01 Nov 2019 08:33      <<CXR>>  ******PRELIMINARY REPORT******        INTERPRETATION:  Left PICC terminates in the SVC.    Clear lungs.

## 2019-11-03 NOTE — H&P ADULT - PROBLEM SELECTOR PLAN 6
1.  Name of PCP:  2.  PCP Contacted on Admission: [ ] Y    [ ] N    3.  PCP contacted at Discharge: [ ] Y    [ ] N    [ ] N/A  4.  Post-Discharge Appointment Date and Location:  5.  Summary of Handoff given to PCP:

## 2019-11-03 NOTE — PHYSICAL THERAPY INITIAL EVALUATION ADULT - PERTINENT HX OF CURRENT PROBLEM, REHAB EVAL
79F with PMH b/l TKR (2008), depression/anxiety, HLD brought in for rehab placement. Pt with recent admission at [facility] from 10/-10/22 for b/l MSSA infection of both knees, s/p washout and antibiotic spacer placement, discharged to [facility] for rehab. CXR (-)

## 2019-11-03 NOTE — H&P ADULT - NSHPREVIEWOFSYSTEMS_GEN_ALL_CORE
REVIEW OF SYSTEMS:    CONSTITUTIONAL: No weakness, fevers, chills  EYES/ENT: No visual changes;  no vertigo or throat pain   NECK: No pain or stiffness  RESPIRATORY: No cough, wheezing, no shortness of breath  CARDIOVASCULAR: No chest pain or palpitations  GASTROINTESTINAL: no nausea, vomiting, no abdominal pain  GENITOURINARY: no hematuria, no dysuria  NEUROLOGICAL: no numbness, no headaches, no confusion   MUSCULOSKELETAL: no back pain, +R knee swelling, no knee pain    SKIN: No rashes, or lesions   PSYCH: no anxiety, depression  HEME: no gum bleeding, no bruising

## 2019-11-03 NOTE — H&P ADULT - NSHPPHYSICALEXAM_GEN_ALL_CORE
Vital Signs Last 24 Hrs  T(C): 37.2 (03 Nov 2019 01:33), Max: 37.2 (03 Nov 2019 01:33)  T(F): 99 (03 Nov 2019 01:33), Max: 99 (03 Nov 2019 01:33)  HR: 89 (03 Nov 2019 01:33) (88 - 91)  BP: 136/63 (03 Nov 2019 01:33) (125/71 - 154/76)  BP(mean): --  RR: 16 (03 Nov 2019 01:33) (16 - 18)  SpO2: 95% (03 Nov 2019 01:33) (95% - 100%)    PHYSICAL EXAM:  GENERAL: NAD, well-developed  HEAD:  Atraumatic, normocephalic  EYES: EOMI, conjunctiva and sclera clear  NECK: Supple, no JVD  CHEST/LUNG: Clear to auscultation bilaterally; no wheezing or rales  HEART: Regular rate and rhythm; no murmurs  ABDOMEN: Soft, nontender, nondistended; bowel sounds present  EXTREMITIES:  2+ Peripheral Pulses, no LE edema  PSYCH: calm affect, not anxious  NEUROLOGY: non-focal, AAOx3  SKIN: No rashes or lesions, +RUE PICC line C/D/I  MUSCULOSKELETAL: no back pain, moving all extremities, b/l knee effusions, R>L, well healing surgical scars on b/l knees, L knee with healing wound, no purulence, no tenderness b/l

## 2019-11-03 NOTE — H&P ADULT - HISTORY OF PRESENT ILLNESS
79F with PMH b/l TKR (2008), depression/anxiety, HLD brought in for rehab placement. Pt with recent admission at [facility] from 10/-10/22 for b/l MSSA infection of both knees, s/p washout and antibiotic spacer placement, discharged to [facility] for rehab. Pt states she has been progressing well with her rehab, able to ambulate comfortably with walker now, has intermittent swelling at surgical sites, usually R>L, but denies any pain, fevers, chills, erythema, numbness/tingling. Had an important appointment with her  on evening of 11/2 and had to leave [facility]; per pt, she inadvertently signed herself out AMA without realizing and when she returned to [facility] later that evening, was told she was discharged. She was subsequently sent to [facility] to be set up for readmission to [facility]. Currently pt denies any complaints, getting over a viral infection (in EMR, noted with +paraflu last week), denies any symptoms at this time, ROS negative.   Receiving Ancef for MSSA infx via RUE PICC until 11/21.

## 2019-11-03 NOTE — PHYSICAL THERAPY INITIAL EVALUATION ADULT - DISCHARGE DISPOSITION, PT EVAL
Strong peripheral pulses
rehabilitation facility/subacute rehab for strengthening, bed mob, transfer, gait and balance, endurance training, however, pt preferred home, if home, Home with Home PT for strengthening, bed mob, transfer, gait and balance training

## 2019-11-03 NOTE — H&P ADULT - NSICDXPASTSURGICALHX_GEN_ALL_CORE_FT
PAST SURGICAL HISTORY:  H/O total knee replacement bilateral    History of appendectomy     S/P cataract surgery

## 2019-11-03 NOTE — H&P ADULT - PROBLEM SELECTOR PLAN 1
b/l knee infection with MSSA, s/p washout and abx spacer placement between 10/7-10/22  currently no s/s of acute pathology, pt ambulating comfortably with walker, denies any pain   c/w ancef via PICC line - check CBC/CMP in AM (ID Dr Matthew Boyd)   outpt ortho f/u     PT and SW/CM to reestablish pt at rehab facility

## 2019-11-03 NOTE — CHART NOTE - NSCHARTNOTEFT_GEN_A_CORE
Patient seen and examined.  Meds, labs and vitals all reviewed.  For full H and P, please refer to note in [place] from same date.  Patient is requesting transition of care to Dr. Gonzalez, who is familiar with her case.

## 2019-11-03 NOTE — PHYSICAL THERAPY INITIAL EVALUATION ADULT - ADDITIONAL COMMENTS
as per pt, resides in a PH alone,  just past away 2 weeks ago, no stair to enter, everything on 1st floor, PTA, pt amb (I) with RW, (I) with ADLs.

## 2019-11-04 ENCOUNTER — TRANSCRIPTION ENCOUNTER (OUTPATIENT)
Age: 79
End: 2019-11-04

## 2019-11-04 PROCEDURE — 99221 1ST HOSP IP/OBS SF/LOW 40: CPT

## 2019-11-04 PROCEDURE — 73560 X-RAY EXAM OF KNEE 1 OR 2: CPT | Mod: 26,RT

## 2019-11-04 RX ORDER — CEFAZOLIN SODIUM 1 G
2000 VIAL (EA) INJECTION EVERY 8 HOURS
Refills: 0 | Status: DISCONTINUED | OUTPATIENT
Start: 2019-11-04 | End: 2019-11-05

## 2019-11-04 RX ORDER — CEFAZOLIN SODIUM 1 G
2 VIAL (EA) INJECTION
Qty: 0 | Refills: 0 | DISCHARGE

## 2019-11-04 RX ORDER — CEFAZOLIN SODIUM 1 G
2 VIAL (EA) INJECTION
Qty: 102 | Refills: 0
Start: 2019-11-04 | End: 2019-11-20

## 2019-11-04 RX ADMIN — OXYCODONE HYDROCHLORIDE 5 MILLIGRAM(S): 5 TABLET ORAL at 23:27

## 2019-11-04 RX ADMIN — Medication 100 MILLIGRAM(S): at 22:27

## 2019-11-04 RX ADMIN — Medication 600 MILLIGRAM(S): at 17:15

## 2019-11-04 RX ADMIN — PANTOPRAZOLE SODIUM 40 MILLIGRAM(S): 20 TABLET, DELAYED RELEASE ORAL at 06:26

## 2019-11-04 RX ADMIN — Medication 20 MILLIGRAM(S): at 12:18

## 2019-11-04 RX ADMIN — ATORVASTATIN CALCIUM 10 MILLIGRAM(S): 80 TABLET, FILM COATED ORAL at 22:27

## 2019-11-04 RX ADMIN — Medication 25 MILLIGRAM(S): at 17:15

## 2019-11-04 RX ADMIN — LORATADINE 10 MILLIGRAM(S): 10 TABLET ORAL at 00:31

## 2019-11-04 RX ADMIN — OXYCODONE HYDROCHLORIDE 5 MILLIGRAM(S): 5 TABLET ORAL at 22:27

## 2019-11-04 RX ADMIN — Medication 25 MILLIGRAM(S): at 06:26

## 2019-11-04 RX ADMIN — QUETIAPINE FUMARATE 25 MILLIGRAM(S): 200 TABLET, FILM COATED ORAL at 12:18

## 2019-11-04 RX ADMIN — Medication 600 MILLIGRAM(S): at 06:26

## 2019-11-04 RX ADMIN — Medication 100 MILLIGRAM(S): at 09:05

## 2019-11-04 RX ADMIN — Medication 100 MILLIGRAM(S): at 00:33

## 2019-11-04 RX ADMIN — RIVAROXABAN 10 MILLIGRAM(S): KIT at 12:18

## 2019-11-04 NOTE — DISCHARGE NOTE NURSING/CASE MANAGEMENT/SOCIAL WORK - NSDCPNDISPN_GEN_ALL_CORE
Safe use, storage and disposal of opioids when prescribed/Activities of daily living, including home environment that might     exacerbate pain or reduce effectiveness of the pain management plan of care as well as strategies to address these issues/Side effects of pain management treatment/Education provided on the pain management plan of care

## 2019-11-04 NOTE — CONSULT NOTE ADULT - SUBJECTIVE AND OBJECTIVE BOX
Consulted on a 79y F who presented to [facility] ED from rehab facility for placement back into a rehab facility. Pt recently had a BL TKA Revision surgery, with R Removal of hardware and placement of antibiotic spacer, along with L TKA removal of hardware and placement of antibiotic spacer. Pt has been on IV antibiotics via picc. Denies numbness/tingling of the BL LE. Denies fever/chills/night sweats. Pt has a hx of L TKA I&D and poly exchange by Dr. Montoya in Oct 2018. Pt was doing well post op, on antibiotics when she continued to have some pain in her L Knee and followed up with [facility] where she originally had her BL TKAs performed. No recent trauma/falls. Pt was not wearing BL Knee braces when seen and examined at bedside with Dr. Montoya, pt has a hx of being non compliant and not following protocol. NO other complaints at this time.       Home Medications:  butalbital-acetaminophen 25 mg-325 mg oral tablet: 1 tab(s) orally every 4 hours, As Needed (03 Nov 2019 01:13)  calcium (as carbonate) 600 mg oral tablet: 1 tab(s) orally once a day (03 Nov 2019 01:13)  Colace 100 mg oral capsule: 1 cap(s) orally 2 times a day (03 Nov 2019 01:13)  KlonoPIN 0.5 mg oral tablet: 1 tab(s) orally 2 times a day, As Needed (03 Nov 2019 01:13)  Lipitor 10 mg oral tablet: 1 tab(s) orally once a day (03 Nov 2019 01:13)  Metoprolol Tartrate 25 mg oral tablet: 1 tab(s) orally 2 times a day (03 Nov 2019 01:13)  Mucinex 600 mg oral tablet, extended release: 1 tab(s) orally every 12 hours (03 Nov 2019 01:13)  oxyCODONE 5 mg oral tablet: 1 tab(s) orally every 4 hours, As Needed (03 Nov 2019 01:13)  PARoxetine 20 mg oral tablet: 1 tab(s) orally once a day (03 Nov 2019 01:13)  Protonix 40 mg oral delayed release tablet: 1 tab(s) orally once a day (03 Nov 2019 01:13)  QUEtiapine 25 mg oral tablet: orally once a day (03 Nov 2019 01:13)  Senna 8.6 mg oral tablet: 2 tab(s) orally once a day (at bedtime) (03 Nov 2019 01:13)  Xarelto 10 mg oral tablet: 1 tab(s) orally once a day (03 Nov 2019 01:13)      PAST MEDICAL & SURGICAL HISTORY:  Hyperlipemia  Anxiety  H/O total knee replacement: bilateral  History of appendectomy  S/P cataract surgery      Allergies    No Known Allergies    Intolerances        Vital Signs Last 24 Hrs  T(C): 36.9 (04 Nov 2019 16:50), Max: 36.9 (04 Nov 2019 16:50)  T(F): 98.5 (04 Nov 2019 16:50), Max: 98.5 (04 Nov 2019 16:50)  HR: 88 (04 Nov 2019 16:50) (65 - 88)  BP: 137/50 (04 Nov 2019 16:50) (127/59 - 151/76)  BP(mean): --  RR: 18 (04 Nov 2019 16:50) (18 - 18)  SpO2: 97% (04 Nov 2019 16:50) (97% - 98%)    PE:     GEN: NAD       Right Lower Extremity:  Incision c/d/i with some steri strips over incision  Limited Motion In flexion and extension  Stable in varus valgus stress  SILT L2-S1  +EHL/FHL/TA/Gastroc  DP+  Soft compartments, - calf ttp       Left Lower Extremity:  incision c/d/i  no flexion/extention, motion is limited due to spacer  stable with varus/valgus stress   SILT L2-S1  +EHL/FHL/TA/Gastroc  DP+  Soft compartments, - calf ttp     XR: BL Knees: R knee with spacer intact, L knee with spacer and jemima intact

## 2019-11-04 NOTE — PROGRESS NOTE ADULT - SUBJECTIVE AND OBJECTIVE BOX
Patient is a 79y old  Female who presents with a chief complaint of need rehab placement (03 Nov 2019 01:29)      SUBJECTIVE / OVERNIGHT EVENTS:   wants to go home on wednesday.  her rehab woud have been completed on Wednesday    MEDICATIONS  (STANDING):  atorvastatin 10 milliGRAM(s) Oral at bedtime  ceFAZolin   IVPB      ceFAZolin   IVPB 1000 milliGRAM(s) IV Intermittent every 8 hours  guaiFENesin  milliGRAM(s) Oral every 12 hours  metoprolol tartrate 25 milliGRAM(s) Oral two times a day  pantoprazole    Tablet 40 milliGRAM(s) Oral before breakfast  PARoxetine 20 milliGRAM(s) Oral daily  QUEtiapine 25 milliGRAM(s) Oral daily  rivaroxaban 10 milliGRAM(s) Oral daily  senna 2 Tablet(s) Oral at bedtime    MEDICATIONS  (PRN):  acetaminophen   Tablet .. 650 milliGRAM(s) Oral every 6 hours PRN Temp greater or equal to 38C (100.4F), Mild Pain (1 - 3)  clonazePAM  Tablet 0.5 milliGRAM(s) Oral two times a day PRN anxiety  oxyCODONE    IR 5 milliGRAM(s) Oral every 4 hours PRN Severe Pain (7 - 10)      Vital Signs Last 24 Hrs  T(C): 36.3 (04 Nov 2019 08:19), Max: 36.8 (03 Nov 2019 20:54)  T(F): 97.3 (04 Nov 2019 08:19), Max: 98.2 (03 Nov 2019 20:54)  HR: 65 (04 Nov 2019 08:19) (65 - 81)  BP: 131/73 (04 Nov 2019 08:19) (127/59 - 151/76)  BP(mean): --  RR: 18 (04 Nov 2019 08:19) (18 - 18)  SpO2: 97% (04 Nov 2019 08:19) (95% - 98%)  CAPILLARY BLOOD GLUCOSE        I&O's Summary    03 Nov 2019 07:01  -  04 Nov 2019 07:00  --------------------------------------------------------  IN: 100 mL / OUT: 4 mL / NET: 96 mL        PHYSICAL EXAM:  GENERAL: NAD, well-developed  HEAD:  Atraumatic, Normocephalic  EYES: EOMI, PERRLA, conjunctiva and sclera clear  NECK: Supple, No JVD  CHEST/LUNG: Clear to auscultation bilaterally; No wheeze  HEART: Regular rate and rhythm; No murmurs, rubs, or gallops  ABDOMEN: Soft, Nontender, Nondistended; Bowel sounds present  EXTREMITIES:  2+ Peripheral Pulses, No clubbing, cyanosis, or edema  PSYCH: AAOx3  NEUROLOGY: non-focal  SKIN: No rashes or lesions    LABS:                        8.7    3.34  )-----------( 325      ( 03 Nov 2019 09:13 )             28.3     11-03    136  |  103  |  12  ----------------------------<  89  3.9   |  24  |  0.57    Ca    8.3<L>      03 Nov 2019 07:28  Phos  3.6     11-03  Mg     2.0     11-03                RADIOLOGY & ADDITIONAL TESTS:    Imaging Personally Reviewed:    Consultant(s) Notes Reviewed:      Care Discussed with Consultants/Other Providers:

## 2019-11-04 NOTE — CONSULT NOTE ADULT - ASSESSMENT
A/P: 79y F s/p BL TKA RANDI and Placement of Abx spacers    Pt was seen and examined at bedside with Dr. Montoya  Pt was operated by Dr. Montoya in october of 2018 for L TKA I&D and Poly Exchange. Pt recently Followed up with orthopedist at [facility] and recieved BL TKA RANDI and placement of abx spacers  -Pt is to continue with Protocol given to Her by her own orthopedist  -50% partial WB BLLE  -Keep BL LE in Knee immobilizers at all times  -C/w Abx per ID via PICC  -FU SW regarding [facility] vs Home with Home Aid  -Vitals stable  no further ortho intervention at this time  ortho stable for DC  pt is to FOllow up with own orthopedist at [facility] when discharged, call office for appointment  attending aware and agrees with above

## 2019-11-04 NOTE — DISCHARGE NOTE NURSING/CASE MANAGEMENT/SOCIAL WORK - PATIENT PORTAL LINK FT
You can access the FollowMyHealth Patient Portal offered by [facility] by registering at the following website: [email or web address]. By joining [email or web address]’s FollowMyHealth portal, you will also be able to view your health information using other applications (apps) compatible with our system.

## 2019-11-04 NOTE — CONSULT NOTE ADULT - ATTENDING COMMENTS
I agree with the above note and have personally seen and examined this patient. All pertinent films have been reviewed. Please refer to clinical documentation of the history, physical examinations, data summary, and both assessment and plan as documented above and with which I agree.    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 Has history of noncompliance with treatment. Failed 1-stage I&D when stopped abx postop. Developed bl TKA PJI and now s/p bl tka explant and spacer placement. RLE has articulating spacer and LLE with static spacer. On entry to room was sitting at edge of bed, both knees partially bent and not in braces. I asked her why she did not have braces on her legs and she admitted that she was told to wear these full time but doesn't want to wear them. I made her aware of risks of noncompliance with bracewear including periprosthetic fracture and bone erosion. She agreed to don her braces which we helped her with.   BL wound healing well, RLE knee ROM 0-90, mildly stable vv. LLE limited knee rom 0-20, mildly stable vv. Bl distally silt spn/dpn, 2+ dp. firing ta/ehl/gcs.  Recently discharged from [facility] for eloping. now at [facility] for snf replacement.   has planned reimplant on bl le in january at [facility] by her surgeon. cont abx per id protocol. pwb 50% bl le with walker and braces full time.     Bunny Montoya MD  Attending Orthopedic Surgeon

## 2019-11-05 ENCOUNTER — TRANSCRIPTION ENCOUNTER (OUTPATIENT)
Age: 79
End: 2019-11-05

## 2019-11-05 VITALS
SYSTOLIC BLOOD PRESSURE: 131 MMHG | DIASTOLIC BLOOD PRESSURE: 62 MMHG | TEMPERATURE: 98 F | OXYGEN SATURATION: 97 % | RESPIRATION RATE: 18 BRPM | HEART RATE: 76 BPM

## 2019-11-05 LAB
24R-OH-CALCIDIOL SERPL-MCNC: 30.7 NG/ML — SIGNIFICANT CHANGE UP (ref 30–80)
ANION GAP SERPL CALC-SCNC: 11 MMOL/L — SIGNIFICANT CHANGE UP (ref 5–17)
BUN SERPL-MCNC: 11 MG/DL — SIGNIFICANT CHANGE UP (ref 7–23)
CALCIUM SERPL-MCNC: 8.4 MG/DL — SIGNIFICANT CHANGE UP (ref 8.4–10.5)
CHLORIDE SERPL-SCNC: 104 MMOL/L — SIGNIFICANT CHANGE UP (ref 96–108)
CO2 SERPL-SCNC: 26 MMOL/L — SIGNIFICANT CHANGE UP (ref 22–31)
CREAT SERPL-MCNC: 0.67 MG/DL — SIGNIFICANT CHANGE UP (ref 0.5–1.3)
FERRITIN SERPL-MCNC: 169 NG/ML — HIGH (ref 15–150)
FOLATE SERPL-MCNC: 17.7 NG/ML — SIGNIFICANT CHANGE UP
GLUCOSE SERPL-MCNC: 97 MG/DL — SIGNIFICANT CHANGE UP (ref 70–99)
HCT VFR BLD CALC: 28.1 % — LOW (ref 34.5–45)
HGB BLD-MCNC: 8.4 G/DL — LOW (ref 11.5–15.5)
IRON SATN MFR SERPL: 12 % — LOW (ref 14–50)
IRON SATN MFR SERPL: 33 UG/DL — SIGNIFICANT CHANGE UP (ref 30–160)
MAGNESIUM SERPL-MCNC: 1.9 MG/DL — SIGNIFICANT CHANGE UP (ref 1.6–2.6)
MCHC RBC-ENTMCNC: 26.7 PG — LOW (ref 27–34)
MCHC RBC-ENTMCNC: 29.9 GM/DL — LOW (ref 32–36)
MCV RBC AUTO: 89.2 FL — SIGNIFICANT CHANGE UP (ref 80–100)
PHOSPHATE SERPL-MCNC: 3.5 MG/DL — SIGNIFICANT CHANGE UP (ref 2.5–4.5)
PLATELET # BLD AUTO: 360 K/UL — SIGNIFICANT CHANGE UP (ref 150–400)
POTASSIUM SERPL-MCNC: 3.9 MMOL/L — SIGNIFICANT CHANGE UP (ref 3.5–5.3)
POTASSIUM SERPL-SCNC: 3.9 MMOL/L — SIGNIFICANT CHANGE UP (ref 3.5–5.3)
RBC # BLD: 3.15 M/UL — LOW (ref 3.8–5.2)
RBC # FLD: 17.4 % — HIGH (ref 10.3–14.5)
SODIUM SERPL-SCNC: 141 MMOL/L — SIGNIFICANT CHANGE UP (ref 135–145)
TIBC SERPL-MCNC: 277 UG/DL — SIGNIFICANT CHANGE UP (ref 220–430)
TSH SERPL-MCNC: 1.69 UIU/ML — SIGNIFICANT CHANGE UP (ref 0.27–4.2)
UIBC SERPL-MCNC: 244 UG/DL — SIGNIFICANT CHANGE UP (ref 110–370)
VIT B12 SERPL-MCNC: 286 PG/ML — SIGNIFICANT CHANGE UP (ref 232–1245)
WBC # BLD: 4.04 K/UL — SIGNIFICANT CHANGE UP (ref 3.8–10.5)
WBC # FLD AUTO: 4.04 K/UL — SIGNIFICANT CHANGE UP (ref 3.8–10.5)

## 2019-11-05 RX ORDER — CEFAZOLIN SODIUM 1 G
2 VIAL (EA) INJECTION
Qty: 102 | Refills: 0
Start: 2019-11-05 | End: 2019-11-21

## 2019-11-05 RX ADMIN — QUETIAPINE FUMARATE 25 MILLIGRAM(S): 200 TABLET, FILM COATED ORAL at 11:34

## 2019-11-05 RX ADMIN — Medication 20 MILLIGRAM(S): at 11:34

## 2019-11-05 RX ADMIN — Medication 100 MILLIGRAM(S): at 05:14

## 2019-11-05 RX ADMIN — PANTOPRAZOLE SODIUM 40 MILLIGRAM(S): 20 TABLET, DELAYED RELEASE ORAL at 05:14

## 2019-11-05 RX ADMIN — Medication 25 MILLIGRAM(S): at 05:13

## 2019-11-05 RX ADMIN — RIVAROXABAN 10 MILLIGRAM(S): KIT at 11:34

## 2019-11-05 NOTE — PROGRESS NOTE ADULT - PROBLEM SELECTOR PLAN 2
pt with difficulty ambulating 2/2 MSSA knee infections   d/c planning to home with homecare  fall precautions

## 2019-11-05 NOTE — PROGRESS NOTE ADULT - SUBJECTIVE AND OBJECTIVE BOX
Patient is a 79y old  Female who presents with a chief complaint of need rehab placement (05 Nov 2019 10:09)      SUBJECTIVE / OVERNIGHT EVENTS:  No chest pain. No shortness of breath. No complaints. No events overnight.     MEDICATIONS  (STANDING):  atorvastatin 10 milliGRAM(s) Oral at bedtime  ceFAZolin   IVPB 2000 milliGRAM(s) IV Intermittent every 8 hours  guaiFENesin  milliGRAM(s) Oral every 12 hours  metoprolol tartrate 25 milliGRAM(s) Oral two times a day  pantoprazole    Tablet 40 milliGRAM(s) Oral before breakfast  PARoxetine 20 milliGRAM(s) Oral daily  QUEtiapine 25 milliGRAM(s) Oral daily  rivaroxaban 10 milliGRAM(s) Oral daily  senna 2 Tablet(s) Oral at bedtime    MEDICATIONS  (PRN):  acetaminophen   Tablet .. 650 milliGRAM(s) Oral every 6 hours PRN Temp greater or equal to 38C (100.4F), Mild Pain (1 - 3)  clonazePAM  Tablet 0.5 milliGRAM(s) Oral two times a day PRN anxiety  oxyCODONE    IR 5 milliGRAM(s) Oral every 4 hours PRN Severe Pain (7 - 10)      Vital Signs Last 24 Hrs  T(C): 36.7 (05 Nov 2019 10:38), Max: 37.3 (05 Nov 2019 05:16)  T(F): 98 (05 Nov 2019 10:38), Max: 99.1 (05 Nov 2019 05:16)  HR: 76 (05 Nov 2019 10:38) (76 - 88)  BP: 131/62 (05 Nov 2019 10:38) (131/62 - 150/79)  BP(mean): --  RR: 18 (05 Nov 2019 10:38) (18 - 18)  SpO2: 97% (05 Nov 2019 10:38) (94% - 97%)  CAPILLARY BLOOD GLUCOSE        I&O's Summary    04 Nov 2019 07:01  -  05 Nov 2019 07:00  --------------------------------------------------------  IN: 550 mL / OUT: 0 mL / NET: 550 mL        PHYSICAL EXAM:  GENERAL: NAD, well-developed  HEAD:  Atraumatic, Normocephalic  EYES: EOMI, PERRLA, conjunctiva and sclera clear  NECK: Supple, No JVD  CHEST/LUNG: Clear to auscultation bilaterally; No wheeze  HEART: Regular rate and rhythm; No murmurs, rubs, or gallops  ABDOMEN: Soft, Nontender, Nondistended; Bowel sounds present  EXTREMITIES:  2+ Peripheral Pulses, No clubbing, cyanosis, or edema  PSYCH: AAOx3  NEUROLOGY: non-focal  SKIN: No rashes or lesions    LABS:                        8.4    4.04  )-----------( 360      ( 05 Nov 2019 10:39 )             28.1     11-05    141  |  104  |  11  ----------------------------<  97  3.9   |  26  |  0.67    Ca    8.4      05 Nov 2019 07:13  Phos  3.5     11-05  Mg     1.9     11-05                RADIOLOGY & ADDITIONAL TESTS:    Imaging Personally Reviewed:    Consultant(s) Notes Reviewed:      Care Discussed with Consultants/Other Providers:

## 2019-11-05 NOTE — DISCHARGE NOTE PROVIDER - NSDCCPCAREPLAN_GEN_ALL_CORE_FT
PRINCIPAL DISCHARGE DIAGNOSIS  Diagnosis: Prosthetic joint infection  Assessment and Plan of Treatment: Continue  IV Ancef via PICC line  till 11/21/19  - check CBC/CMP weekly   -Follow-up with ID Dr. Matthew Boyd  - Follow-up with outptatient  ortho and physical therapy.      SECONDARY DISCHARGE DIAGNOSES  Diagnosis: Gait disturbance  Assessment and Plan of Treatment: Gait disturbance: fall precautions, home physical therapy.   Keep leg brace on as recommended by your Orthopedic provider.    Diagnosis: Hyperlipemia  Assessment and Plan of Treatment: Follow up with PCP for treatment goals, continue medication, have liver function testing every 3 months as anti lipid medications can cause liver irritation, eat low fat, low cholesterol meals    Diagnosis: Anxiety  Assessment and Plan of Treatment: Anxiety.  Plan: c/w paxil  c/w klonopin 0.5mg BID prn anxiety  dose/rx verified ISTOP #[id number] PRINCIPAL DISCHARGE DIAGNOSIS  Diagnosis: Prosthetic joint infection  Assessment and Plan of Treatment: Continue  IV Ancef via PICC line  till 11/21/19  continue Xarelto --DVT/PE prevention   - check CBC/CMP weekly   -Follow-up with ID Dr. Matthew Boyd  - Follow-up with outptatient  ortho and physical therapy.      SECONDARY DISCHARGE DIAGNOSES  Diagnosis: Gait disturbance  Assessment and Plan of Treatment: Gait disturbance: fall precautions, home physical therapy.   Keep leg brace on as recommended by your Orthopedic provider.    Diagnosis: Hyperlipemia  Assessment and Plan of Treatment: Follow up with PCP for treatment goals, continue medication, have liver function testing every 3 months as anti lipid medications can cause liver irritation, eat low fat, low cholesterol meals    Diagnosis: Anxiety  Assessment and Plan of Treatment: Anxiety.  Plan: c/w paxil  c/w klonopin 0.5mg BID prn anxiety  dose/rx verified ISTOP #[id number]    Diagnosis: Essential hypertension  Assessment and Plan of Treatment: Low salt diet  Activity as tolerated.  Take all medication as prescribed.  Follow up with your medical doctor for routine blood pressure monitoring at your next visit.  Notify your doctor if you have any of the following symptoms:   Dizziness, Lightheadedness, Blurry vision, Headache, Chest pain, Shortness of breath

## 2019-11-05 NOTE — PHARMACOTHERAPY INTERVENTION NOTE - COMMENTS
Pharmacy intern discussed discharge medication name, dose, frequency, and side effects. Medication information handout provided from Med Essential Fact Sheet (Hactus Carenotes®).

## 2019-11-05 NOTE — DISCHARGE NOTE PROVIDER - NSDCMRMEDTOKEN_GEN_ALL_CORE_FT
Ancef 1 g/50 mL intravenous solution: 2 gram(s) intravenous 3 times a day  butalbital-acetaminophen 25 mg-325 mg oral tablet: 1 tab(s) orally every 4 hours, As Needed  calcium (as carbonate) 600 mg oral tablet: 1 tab(s) orally once a day  Colace 100 mg oral capsule: 1 cap(s) orally 2 times a day  KlonoPIN 0.5 mg oral tablet: 1 tab(s) orally 2 times a day, As Needed  Lipitor 10 mg oral tablet: 1 tab(s) orally once a day  Metoprolol Tartrate 25 mg oral tablet: 1 tab(s) orally 2 times a day  Mucinex 600 mg oral tablet, extended release: 1 tab(s) orally every 12 hours  oxyCODONE 5 mg oral tablet: 1 tab(s) orally every 4 hours, As Needed  PARoxetine 20 mg oral tablet: 1 tab(s) orally once a day  Protonix 40 mg oral delayed release tablet: 1 tab(s) orally once a day  QUEtiapine 25 mg oral tablet: orally once a day  Senna 8.6 mg oral tablet: 2 tab(s) orally once a day (at bedtime)  Xarelto 10 mg oral tablet: 1 tab(s) orally once a day Ancef 1 g/50 mL intravenous solution: 2 gram(s) intravenous 3 times a day-   till 11/21/19   butalbital-acetaminophen 25 mg-325 mg oral tablet: 1 tab(s) orally every 4 hours, As Needed  calcium (as carbonate) 600 mg oral tablet: 1 tab(s) orally once a day  check CBC/CMP weekly (ID Dr Matthew Boyd) :   Colace 100 mg oral capsule: 1 cap(s) orally 2 times a day  KlonoPIN 0.5 mg oral tablet: 1 tab(s) orally 2 times a day, As Needed  Lipitor 10 mg oral tablet: 1 tab(s) orally once a day  Metoprolol Tartrate 25 mg oral tablet: 1 tab(s) orally 2 times a day  Mucinex 600 mg oral tablet, extended release: 1 tab(s) orally every 12 hours  oxyCODONE 5 mg oral tablet: 1 tab(s) orally every 4 hours, As Needed  PARoxetine 20 mg oral tablet: 1 tab(s) orally once a day  Protonix 40 mg oral delayed release tablet: 1 tab(s) orally once a day  QUEtiapine 25 mg oral tablet: orally once a day  Senna 8.6 mg oral tablet: 2 tab(s) orally once a day (at bedtime)  Xarelto 10 mg oral tablet: 1 tab(s) orally once a day Ancef 1 g/50 mL intravenous solution: 2 gram(s) intravenous 3 times a day-   till 11/21/19   butalbital-acetaminophen 25 mg-325 mg oral tablet: 1 tab(s) orally every 4 hours, As Needed  calcium (as carbonate) 600 mg oral tablet: 1 tab(s) orally once a day  check CBC-diff, LFT, BUN, creatinine  (ID Dr Matthew Boyd--fax [phone]) :   Colace 100 mg oral capsule: 1 cap(s) orally 2 times a day  KlonoPIN 0.5 mg oral tablet: 1 tab(s) orally 2 times a day, As Needed  Lipitor 10 mg oral tablet: 1 tab(s) orally once a day  Metoprolol Tartrate 25 mg oral tablet: 1 tab(s) orally 2 times a day  Mucinex 600 mg oral tablet, extended release: 1 tab(s) orally every 12 hours  oxyCODONE 5 mg oral tablet: 1 tab(s) orally every 4 hours, As Needed  PARoxetine 20 mg oral tablet: 1 tab(s) orally once a day  Protonix 40 mg oral delayed release tablet: 1 tab(s) orally once a day  QUEtiapine 25 mg oral tablet: orally once a day  Senna 8.6 mg oral tablet: 2 tab(s) orally once a day (at bedtime)  Xarelto 10 mg oral tablet: 1 tab(s) orally once a day

## 2019-11-05 NOTE — PROGRESS NOTE ADULT - PROBLEM SELECTOR PLAN 1
b/l knee infection with MSSA, s/p washout and abx spacer placement between 10/7-10/22  currently no s/s of acute pathology, pt ambulating comfortably with walker, denies any pain   c/w ancef via PICC line - check CBC/CMP weekly (ID Dr Matthew Boyd)   outpt ortho f/u

## 2019-11-05 NOTE — DISCHARGE NOTE PROVIDER - HOSPITAL COURSE
79y F who presented to [facility] ED from rehab facility for placement back into a rehab facility. Pt recently had a BL TKA Revision surgery, with R Removal of hardware and placement of antibiotic spacer, along with L TKA removal of hardware and placement of antibiotic spacer. Pt has been on IV antibiotics via picc. Denies numbness/tingling of the BL LE. Denies fever/chills/night sweats. Pt has a hx of L TKA I&D and poly exchange by Dr. Montoya in Oct 2018. Pt was doing well post op, on antibiotics when she continued to have some pain in her L Knee and followed up with [facility] where she originally had her BL TKAs performed. Patient advised to wear brace as advised by her orthopedic provider. Patient presented  from  rehab placement. Patient seen by physical therapy [facility] recommended however, pt preferred home with Home PT f        Prosthetic joint infection.  Plan: b/l knee infection with MSSA, s/p washout and abx spacer placement between 10/7-10/22    currently no s/s of acute pathology, pt ambulating comfortably with walker, denies any pain     c/w ancef via PICC line  till 11/21/19- check CBC/CMP weekly (ID Dr Matthew Boyd)     outpt ortho and physical therapy.             Gait disturbance: fall precautions, home physical therapy.     Keep leg brace on as recommended by your Orthopedic provider.         Essential hypertension.  Plan: c/w lopressor.             Anxiety.  Plan: c/w paxil    c/w klonopin 0.5mg BID prn anxiety    dose/rx verified ISTOP #[id number].

## 2019-11-07 RX ORDER — QUETIAPINE FUMARATE 200 MG/1
1 TABLET, FILM COATED ORAL
Qty: 10 | Refills: 0
Start: 2019-11-07 | End: 2019-11-16

## 2019-11-07 RX ORDER — OXYCODONE HYDROCHLORIDE 5 MG/1
1 TABLET ORAL
Qty: 12 | Refills: 0
Start: 2019-11-07 | End: 2019-11-09

## 2019-11-07 RX ORDER — RIVAROXABAN 15 MG-20MG
1 KIT ORAL
Qty: 0 | Refills: 0 | DISCHARGE

## 2019-11-07 RX ORDER — RIVAROXABAN 15 MG-20MG
1 KIT ORAL
Qty: 30 | Refills: 0
Start: 2019-11-07 | End: 2019-12-06

## 2019-11-07 RX ORDER — QUETIAPINE FUMARATE 200 MG/1
0 TABLET, FILM COATED ORAL
Qty: 0 | Refills: 0 | DISCHARGE

## 2019-11-07 RX ORDER — METOPROLOL TARTRATE 50 MG
1 TABLET ORAL
Qty: 60 | Refills: 0
Start: 2019-11-07 | End: 2019-12-06

## 2019-11-07 RX ORDER — OXYCODONE HYDROCHLORIDE 5 MG/1
1 TABLET ORAL
Qty: 0 | Refills: 0 | DISCHARGE

## 2019-11-07 RX ORDER — METOPROLOL TARTRATE 50 MG
1 TABLET ORAL
Qty: 0 | Refills: 0 | DISCHARGE

## 2019-11-12 PROCEDURE — 99285 EMERGENCY DEPT VISIT HI MDM: CPT

## 2019-11-12 PROCEDURE — 83550 IRON BINDING TEST: CPT

## 2019-11-12 PROCEDURE — 82746 ASSAY OF FOLIC ACID SERUM: CPT

## 2019-11-12 PROCEDURE — 83540 ASSAY OF IRON: CPT

## 2019-11-12 PROCEDURE — 82728 ASSAY OF FERRITIN: CPT

## 2019-11-12 PROCEDURE — 73560 X-RAY EXAM OF KNEE 1 OR 2: CPT

## 2019-11-12 PROCEDURE — 82306 VITAMIN D 25 HYDROXY: CPT

## 2019-11-12 PROCEDURE — 84100 ASSAY OF PHOSPHORUS: CPT

## 2019-11-12 PROCEDURE — 85027 COMPLETE CBC AUTOMATED: CPT

## 2019-11-12 PROCEDURE — 71045 X-RAY EXAM CHEST 1 VIEW: CPT

## 2019-11-12 PROCEDURE — 82607 VITAMIN B-12: CPT

## 2019-11-12 PROCEDURE — 80048 BASIC METABOLIC PNL TOTAL CA: CPT

## 2019-11-12 PROCEDURE — 97162 PT EVAL MOD COMPLEX 30 MIN: CPT

## 2019-11-12 PROCEDURE — 83735 ASSAY OF MAGNESIUM: CPT

## 2019-11-12 PROCEDURE — 84443 ASSAY THYROID STIM HORMONE: CPT

## 2019-11-21 ENCOUNTER — INBOUND DOCUMENT (OUTPATIENT)
Age: 79
End: 2019-11-21

## 2019-12-02 ENCOUNTER — RX RENEWAL (OUTPATIENT)
Age: 79
End: 2019-12-02

## 2019-12-04 ENCOUNTER — MEDICATION RENEWAL (OUTPATIENT)
Age: 79
End: 2019-12-04

## 2019-12-17 ENCOUNTER — RX RENEWAL (OUTPATIENT)
Age: 79
End: 2019-12-17

## 2019-12-18 ENCOUNTER — RX RENEWAL (OUTPATIENT)
Age: 79
End: 2019-12-18

## 2020-02-12 ENCOUNTER — APPOINTMENT (OUTPATIENT)
Dept: INTERNAL MEDICINE | Facility: CLINIC | Age: 80
End: 2020-02-12
Payer: MEDICARE

## 2020-02-12 VITALS
HEIGHT: 66 IN | BODY MASS INDEX: 24.11 KG/M2 | HEART RATE: 97 BPM | OXYGEN SATURATION: 97 % | SYSTOLIC BLOOD PRESSURE: 110 MMHG | WEIGHT: 150 LBS | TEMPERATURE: 97.6 F | DIASTOLIC BLOOD PRESSURE: 80 MMHG

## 2020-02-12 DIAGNOSIS — Z87.898 PERSONAL HISTORY OF OTHER SPECIFIED CONDITIONS: ICD-10-CM

## 2020-02-12 DIAGNOSIS — J45.909 UNSPECIFIED ASTHMA, UNCOMPLICATED: ICD-10-CM

## 2020-02-12 DIAGNOSIS — D64.9 ANEMIA, UNSPECIFIED: ICD-10-CM

## 2020-02-12 PROCEDURE — 99214 OFFICE O/P EST MOD 30 MIN: CPT | Mod: 25

## 2020-02-12 PROCEDURE — 87804 INFLUENZA ASSAY W/OPTIC: CPT | Mod: QW

## 2020-02-12 RX ORDER — ALBUTEROL SULFATE 90 UG/1
108 (90 BASE) AEROSOL, METERED RESPIRATORY (INHALATION)
Qty: 1 | Refills: 3 | Status: ACTIVE | COMMUNITY
Start: 2020-02-12 | End: 1900-01-01

## 2020-02-13 PROBLEM — Z87.898 HISTORY OF SHORTNESS OF BREATH: Status: RESOLVED | Noted: 2017-03-07 | Resolved: 2020-02-13

## 2020-02-13 PROBLEM — D64.9 POSTOPERATIVE ANEMIA: Status: ACTIVE | Noted: 2020-02-13

## 2020-02-13 PROBLEM — J45.909 ASTHMATIC BRONCHITIS: Status: RESOLVED | Noted: 2017-03-07 | Resolved: 2020-02-13

## 2020-02-13 RX ORDER — CEPHALEXIN 500 MG/1
500 CAPSULE ORAL
Qty: 20 | Refills: 0 | Status: DISCONTINUED | COMMUNITY
Start: 2019-08-15 | End: 2020-02-13

## 2020-02-13 RX ORDER — RIFAMPIN 300 MG/1
300 CAPSULE ORAL TWICE DAILY
Refills: 0 | Status: DISCONTINUED | COMMUNITY
Start: 2019-04-30 | End: 2020-02-13

## 2020-02-13 RX ORDER — FAMOTIDINE 20 MG/1
20 TABLET, FILM COATED ORAL
Qty: 60 | Refills: 0 | Status: DISCONTINUED | COMMUNITY
Start: 2018-10-10 | End: 2020-02-13

## 2020-02-13 RX ORDER — CEPHALEXIN 500 MG/1
500 CAPSULE ORAL TWICE DAILY
Refills: 0 | Status: DISCONTINUED | COMMUNITY
Start: 2018-11-14 | End: 2020-02-13

## 2020-02-13 RX ORDER — RIFAMPIN 300 MG/1
300 CAPSULE ORAL TWICE DAILY
Refills: 0 | Status: DISCONTINUED | COMMUNITY
Start: 2019-08-15 | End: 2020-02-13

## 2020-02-15 ENCOUNTER — APPOINTMENT (OUTPATIENT)
Dept: INTERNAL MEDICINE | Facility: CLINIC | Age: 80
End: 2020-02-15

## 2020-02-28 ENCOUNTER — APPOINTMENT (OUTPATIENT)
Dept: INTERNAL MEDICINE | Facility: CLINIC | Age: 80
End: 2020-02-28
Payer: MEDICARE

## 2020-02-28 PROCEDURE — 36415 COLL VENOUS BLD VENIPUNCTURE: CPT

## 2020-02-29 DIAGNOSIS — R73.09 OTHER ABNORMAL GLUCOSE: ICD-10-CM

## 2020-02-29 LAB
25(OH)D3 SERPL-MCNC: 34.3 NG/ML
ALBUMIN SERPL ELPH-MCNC: 3.8 G/DL
ALP BLD-CCNC: 351 U/L
ALT SERPL-CCNC: 14 U/L
ANION GAP SERPL CALC-SCNC: 11 MMOL/L
AST SERPL-CCNC: 17 U/L
BASOPHILS # BLD AUTO: 0.07 K/UL
BASOPHILS NFR BLD AUTO: 1.2 %
BILIRUB SERPL-MCNC: <0.2 MG/DL
BUN SERPL-MCNC: 25 MG/DL
CALCIUM SERPL-MCNC: 9.7 MG/DL
CHLORIDE SERPL-SCNC: 106 MMOL/L
CHOLEST SERPL-MCNC: 175 MG/DL
CHOLEST/HDLC SERPL: 2.2 RATIO
CO2 SERPL-SCNC: 26 MMOL/L
CREAT SERPL-MCNC: 0.67 MG/DL
EOSINOPHIL # BLD AUTO: 1.12 K/UL
EOSINOPHIL NFR BLD AUTO: 19 %
GLUCOSE SERPL-MCNC: 111 MG/DL
HCT VFR BLD CALC: 36 %
HDLC SERPL-MCNC: 80 MG/DL
HGB BLD-MCNC: 10.9 G/DL
IMM GRANULOCYTES NFR BLD AUTO: 0.2 %
IRON SATN MFR SERPL: 15 %
IRON SERPL-MCNC: 45 UG/DL
LDLC SERPL CALC-MCNC: 79 MG/DL
LYMPHOCYTES # BLD AUTO: 1.57 K/UL
LYMPHOCYTES NFR BLD AUTO: 26.7 %
MAN DIFF?: NORMAL
MCHC RBC-ENTMCNC: 28.6 PG
MCHC RBC-ENTMCNC: 30.3 GM/DL
MCV RBC AUTO: 94.5 FL
MONOCYTES # BLD AUTO: 0.54 K/UL
MONOCYTES NFR BLD AUTO: 9.2 %
NEUTROPHILS # BLD AUTO: 2.58 K/UL
NEUTROPHILS NFR BLD AUTO: 43.7 %
PLATELET # BLD AUTO: 400 K/UL
POTASSIUM SERPL-SCNC: 5.2 MMOL/L
PROT SERPL-MCNC: 6.3 G/DL
RBC # BLD: 3.81 M/UL
RBC # FLD: 14.6 %
SODIUM SERPL-SCNC: 144 MMOL/L
T4 SERPL-MCNC: 4.4 UG/DL
TIBC SERPL-MCNC: 298 UG/DL
TRIGL SERPL-MCNC: 81 MG/DL
TSH SERPL-ACNC: 1.16 UIU/ML
UIBC SERPL-MCNC: 253 UG/DL
WBC # FLD AUTO: 5.89 K/UL

## 2020-03-01 LAB
ESTIMATED AVERAGE GLUCOSE: 103 MG/DL
HBA1C MFR BLD HPLC: 5.2 %

## 2020-03-03 ENCOUNTER — APPOINTMENT (OUTPATIENT)
Dept: INTERNAL MEDICINE | Facility: CLINIC | Age: 80
End: 2020-03-03
Payer: MEDICARE

## 2020-03-03 VITALS
DIASTOLIC BLOOD PRESSURE: 80 MMHG | RESPIRATION RATE: 16 BRPM | BODY MASS INDEX: 25.55 KG/M2 | SYSTOLIC BLOOD PRESSURE: 140 MMHG | HEIGHT: 66 IN | HEART RATE: 80 BPM | WEIGHT: 159 LBS

## 2020-03-03 PROCEDURE — 99214 OFFICE O/P EST MOD 30 MIN: CPT

## 2020-03-03 NOTE — ASSESSMENT
[FreeTextEntry1] : Pt is sp knee replacement and spacer for chronically infected knees\par Can cut Paxil to 20 mg per day as not liking on 30 mg  -more whooshing in head.\par Labs reviewed.\par Continue meds.\par FU 2 months - CC\par

## 2020-03-03 NOTE — PHYSICAL EXAM
[Normal] : no acute distress, well nourished, well developed and well-appearing [Normal Oropharynx] : the oropharynx was normal [Normal Sclera/Conjunctiva] : normal sclera/conjunctiva [No JVD] : no jugular venous distention [Supple] : supple [No Lymphadenopathy] : no lymphadenopathy [No Accessory Muscle Use] : no accessory muscle use [No Respiratory Distress] : no respiratory distress  [Normal Rate] : normal rate  [Clear to Auscultation] : lungs were clear to auscultation bilaterally [Regular Rhythm] : with a regular rhythm [Normal S1, S2] : normal S1 and S2 [Soft] : abdomen soft [Non Tender] : non-tender [Normal Supraclavicular Nodes] : no supraclavicular lymphadenopathy [Normal Posterior Cervical Nodes] : no posterior cervical lymphadenopathy [No HSM] : no HSM [de-identified] : Traced edema bilaterally LE's [Normal Anterior Cervical Nodes] : no anterior cervical lymphadenopathy [de-identified] : Knees not warm [de-identified] : LE's mildly red but not warm

## 2020-03-03 NOTE — HISTORY OF PRESENT ILLNESS
[FreeTextEntry1] : Had left knee replaced 1/8/2020  - but spacer on right remains.\par No fevers\par FU with ID\par Weight stable\par \par

## 2020-03-12 ENCOUNTER — OUTPATIENT (OUTPATIENT)
Dept: OUTPATIENT SERVICES | Facility: HOSPITAL | Age: 80
LOS: 1 days | End: 2020-03-12
Payer: MEDICARE

## 2020-03-12 ENCOUNTER — APPOINTMENT (OUTPATIENT)
Dept: ULTRASOUND IMAGING | Facility: CLINIC | Age: 80
End: 2020-03-12
Payer: MEDICARE

## 2020-03-12 DIAGNOSIS — Z00.8 ENCOUNTER FOR OTHER GENERAL EXAMINATION: ICD-10-CM

## 2020-03-12 PROCEDURE — 93970 EXTREMITY STUDY: CPT

## 2020-03-12 PROCEDURE — 93970 EXTREMITY STUDY: CPT | Mod: 26

## 2020-04-03 ENCOUNTER — RX RENEWAL (OUTPATIENT)
Age: 80
End: 2020-04-03

## 2020-04-13 NOTE — ED ADULT TRIAGE NOTE - PRO INTERPRETER NEED 2
Patient missed he audio visit. Provider has a consult visit now. Will reach out to reschedule.   English

## 2020-06-08 ENCOUNTER — RX RENEWAL (OUTPATIENT)
Age: 80
End: 2020-06-08

## 2020-06-29 ENCOUNTER — APPOINTMENT (OUTPATIENT)
Dept: INTERNAL MEDICINE | Facility: CLINIC | Age: 80
End: 2020-06-29
Payer: MEDICARE

## 2020-06-29 VITALS
RESPIRATION RATE: 16 BRPM | HEART RATE: 80 BPM | BODY MASS INDEX: 28.57 KG/M2 | WEIGHT: 177 LBS | SYSTOLIC BLOOD PRESSURE: 130 MMHG | DIASTOLIC BLOOD PRESSURE: 80 MMHG

## 2020-06-29 VITALS — WEIGHT: 177 LBS | BODY MASS INDEX: 28.45 KG/M2 | HEIGHT: 66 IN

## 2020-06-29 PROCEDURE — 99213 OFFICE O/P EST LOW 20 MIN: CPT

## 2020-06-29 RX ORDER — PANTOPRAZOLE 40 MG/1
40 TABLET, DELAYED RELEASE ORAL
Qty: 42 | Refills: 0 | Status: DISCONTINUED | COMMUNITY
Start: 2020-01-13 | End: 2020-06-29

## 2020-06-29 RX ORDER — QUETIAPINE FUMARATE 25 MG/1
25 TABLET ORAL
Qty: 90 | Refills: 0 | Status: DISCONTINUED | COMMUNITY
Start: 2019-12-09 | End: 2020-06-29

## 2020-06-29 NOTE — PHYSICAL EXAM
[Normal Sclera/Conjunctiva] : normal sclera/conjunctiva [No Lymphadenopathy] : no lymphadenopathy [Supple] : supple [Normal] : normal rate, regular rhythm, normal S1 and S2 and no murmur heard [Soft] : abdomen soft [Non Tender] : non-tender [Non-distended] : non-distended [No Masses] : no abdominal mass palpated [No HSM] : no HSM [Normal Posterior Cervical Nodes] : no posterior cervical lymphadenopathy [Normal Supraclavicular Nodes] : no supraclavicular lymphadenopathy [Normal Anterior Cervical Nodes] : no anterior cervical lymphadenopathy [de-identified] : 1+ edema

## 2020-07-14 ENCOUNTER — NON-APPOINTMENT (OUTPATIENT)
Age: 80
End: 2020-07-14

## 2020-07-14 ENCOUNTER — APPOINTMENT (OUTPATIENT)
Dept: INTERNAL MEDICINE | Facility: CLINIC | Age: 80
End: 2020-07-14
Payer: MEDICARE

## 2020-07-14 VITALS
RESPIRATION RATE: 16 BRPM | TEMPERATURE: 97.7 F | WEIGHT: 175 LBS | BODY MASS INDEX: 28.12 KG/M2 | SYSTOLIC BLOOD PRESSURE: 130 MMHG | HEIGHT: 66 IN | DIASTOLIC BLOOD PRESSURE: 80 MMHG | HEART RATE: 80 BPM

## 2020-07-14 DIAGNOSIS — F32.9 MAJOR DEPRESSIVE DISORDER, SINGLE EPISODE, UNSPECIFIED: ICD-10-CM

## 2020-07-14 DIAGNOSIS — Z87.09 PERSONAL HISTORY OF OTHER DISEASES OF THE RESPIRATORY SYSTEM: ICD-10-CM

## 2020-07-14 DIAGNOSIS — Z86.79 PERSONAL HISTORY OF OTHER DISEASES OF THE CIRCULATORY SYSTEM: ICD-10-CM

## 2020-07-14 DIAGNOSIS — K21.9 GASTRO-ESOPHAGEAL REFLUX DISEASE W/OUT ESOPHAGITIS: ICD-10-CM

## 2020-07-14 DIAGNOSIS — E05.90 THYROTOXICOSIS, UNSPECIFIED W/OUT THYROTOXIC CRISIS OR STORM: ICD-10-CM

## 2020-07-14 DIAGNOSIS — Z09 ENCOUNTER FOR FOLLOW-UP EXAMINATION AFTER COMPLETED TREATMENT FOR CONDITIONS OTHER THAN MALIGNANT NEOPLASM: ICD-10-CM

## 2020-07-14 DIAGNOSIS — Z86.39 PERSONAL HISTORY OF OTHER ENDOCRINE, NUTRITIONAL AND METABOLIC DISEASE: ICD-10-CM

## 2020-07-14 PROCEDURE — 99497 ADVNCD CARE PLAN 30 MIN: CPT | Mod: 33

## 2020-07-14 PROCEDURE — G0444 DEPRESSION SCREEN ANNUAL: CPT | Mod: 59

## 2020-07-14 PROCEDURE — 93000 ELECTROCARDIOGRAM COMPLETE: CPT | Mod: 59

## 2020-07-14 PROCEDURE — 99214 OFFICE O/P EST MOD 30 MIN: CPT | Mod: 25

## 2020-07-14 PROCEDURE — 36415 COLL VENOUS BLD VENIPUNCTURE: CPT

## 2020-07-14 PROCEDURE — G0439: CPT

## 2020-07-14 NOTE — HEALTH RISK ASSESSMENT
[Fair] :  ~his/her~ mood as fair [] : No [One fall no injury in past year] : Patient reported one fall in the past year without injury [No] : No [de-identified] : None [Patient declined PAP Smear] : Patient declined PAP Smear [de-identified] : Watching fats [Patient declined colonoscopy] : Patient declined colonoscopy [Change in mental status noted] : No change in mental status noted [None] : None [Alone] : lives alone [Retired] : retired [] :  [Fully functional (bathing, dressing, toileting, transferring, walking, feeding)] : Fully functional (bathing, dressing, toileting, transferring, walking, feeding) [Feels Safe at Home] : Feels safe at home [Fully functional (using the telephone, shopping, preparing meals, housekeeping, doing laundry, using] : Fully functional and needs no help or supervision to perform IADLs (using the telephone, shopping, preparing meals, housekeeping, doing laundry, using transportation, managing medications and managing finances) [Carbon Monoxide Detector] : carbon monoxide detector [Smoke Detector] : smoke detector [Sunscreen] : uses sunscreen [Seat Belt] :  uses seat belt [With Patient/Caregiver] : With Patient/Caregiver [Name: ___] : Health Care Proxy's Name: [unfilled]  [Designated Healthcare Proxy] : Designated healthcare proxy [AdvancecareDate] : 07/20 [FreeTextEntry4] : Had long discussion with the patient.\par Discussed with pt the need for a health care proxy.\par Discussed who can be a proxy, forms given if needed, and the need for the proxy to know their wishes and to make sure they will comply.\par The proxy will need to have a copy in their home and the patient should have a copy.\par \par

## 2020-07-14 NOTE — ASSESSMENT
[FreeTextEntry1] : Pt with above medical issues which requires extra work in addition to the well visit.\par Bloods drawn in office.\par Meds to be adjusted.\par Health counseling given\par FU 6 months.\par

## 2020-07-14 NOTE — REVIEW OF SYSTEMS
[Fever] : no fever [Recent Change In Weight] : ~T recent weight change [Hearing Loss] : hearing loss [Negative] : Heme/Lymph [de-identified] : sees derm [de-identified] : on meds [FreeTextEntry9] : sees ortho

## 2020-07-14 NOTE — HISTORY OF PRESENT ILLNESS
[FreeTextEntry1] : Well visit and follow up for management of:\par Anxiety-depression - on meds\par Hyperlipidemia - on meds\par Low Vit D - on meds

## 2020-07-14 NOTE — PHYSICAL EXAM
[No Acute Distress] : no acute distress [Well Developed] : well developed [Well Nourished] : well nourished [Well-Appearing] : well-appearing [Normal Sclera/Conjunctiva] : normal sclera/conjunctiva [PERRL] : pupils equal round and reactive to light [Normal Oropharynx] : the oropharynx was normal [EOMI] : extraocular movements intact [Normal Outer Ear/Nose] : the outer ears and nose were normal in appearance [No JVD] : no jugular venous distention [Normal TMs] : both tympanic membranes were normal [Supple] : supple [No Lymphadenopathy] : no lymphadenopathy [Thyroid Normal, No Nodules] : the thyroid was normal and there were no nodules present [No Respiratory Distress] : no respiratory distress  [Clear to Auscultation] : lungs were clear to auscultation bilaterally [No Accessory Muscle Use] : no accessory muscle use [Normal Rate] : normal rate  [Regular Rhythm] : with a regular rhythm [No Murmur] : no murmur heard [Normal S1, S2] : normal S1 and S2 [No Carotid Bruits] : no carotid bruits [No Varicosities] : no varicosities [No Abdominal Bruit] : a ~M bruit was not heard ~T in the abdomen [No Palpable Aorta] : no palpable aorta [Pedal Pulses Present] : the pedal pulses are present [No Extremity Clubbing/Cyanosis] : no extremity clubbing/cyanosis [Normal Appearance] : normal in appearance [No Nipple Discharge] : no nipple discharge [Soft] : abdomen soft [No Axillary Lymphadenopathy] : no axillary lymphadenopathy [Non Tender] : non-tender [Non-distended] : non-distended [No Masses] : no abdominal mass palpated [No HSM] : no HSM [Normal Bowel Sounds] : normal bowel sounds [Declined Rectal Exam] : declined rectal exam [Normal Supraclavicular Nodes] : no supraclavicular lymphadenopathy [Normal Anterior Cervical Nodes] : no anterior cervical lymphadenopathy [Normal Axillary Nodes] : no axillary lymphadenopathy [Normal Posterior Cervical Nodes] : no posterior cervical lymphadenopathy [No CVA Tenderness] : no CVA  tenderness [No Joint Swelling] : no joint swelling [No Spinal Tenderness] : no spinal tenderness [No Rash] : no rash [Grossly Normal Strength/Tone] : grossly normal strength/tone [No Focal Deficits] : no focal deficits [Coordination Grossly Intact] : coordination grossly intact [Normal Gait] : normal gait [Deep Tendon Reflexes (DTR)] : deep tendon reflexes were 2+ and symmetric [Alert and Oriented x3] : oriented to person, place, and time [Normal Affect] : the affect was normal [de-identified] : trace edema bilaterally [Normal Insight/Judgement] : insight and judgment were intact [de-identified] : Declines [de-identified] : SP bilateral knee replacements -  [de-identified] : Always racing  [de-identified] : Actinic changes

## 2020-07-16 LAB
25(OH)D3 SERPL-MCNC: 26.5 NG/ML
ALBUMIN SERPL ELPH-MCNC: 4.5 G/DL
ALP BLD-CCNC: 211 U/L
ALT SERPL-CCNC: 27 U/L
ANION GAP SERPL CALC-SCNC: 14 MMOL/L
APPEARANCE: CLEAR
AST SERPL-CCNC: 27 U/L
BASOPHILS # BLD AUTO: 0.04 K/UL
BASOPHILS NFR BLD AUTO: 0.9 %
BILIRUB SERPL-MCNC: 0.2 MG/DL
BILIRUBIN URINE: NEGATIVE
BLOOD URINE: NEGATIVE
BUN SERPL-MCNC: 23 MG/DL
CALCIUM SERPL-MCNC: 10.2 MG/DL
CHLORIDE SERPL-SCNC: 103 MMOL/L
CHOLEST SERPL-MCNC: 209 MG/DL
CHOLEST/HDLC SERPL: 1.8 RATIO
CO2 SERPL-SCNC: 26 MMOL/L
COLOR: YELLOW
CREAT SERPL-MCNC: 0.8 MG/DL
EOSINOPHIL # BLD AUTO: 0.22 K/UL
EOSINOPHIL NFR BLD AUTO: 4.9 %
GLUCOSE QUALITATIVE U: NEGATIVE
GLUCOSE SERPL-MCNC: 81 MG/DL
HCT VFR BLD CALC: 43.7 %
HDLC SERPL-MCNC: 114 MG/DL
HGB BLD-MCNC: 13.5 G/DL
IMM GRANULOCYTES NFR BLD AUTO: 0.2 %
KETONES URINE: NEGATIVE
LDLC SERPL CALC-MCNC: 85 MG/DL
LEUKOCYTE ESTERASE URINE: NEGATIVE
LYMPHOCYTES # BLD AUTO: 1.79 K/UL
LYMPHOCYTES NFR BLD AUTO: 40 %
MAN DIFF?: NORMAL
MCHC RBC-ENTMCNC: 28.8 PG
MCHC RBC-ENTMCNC: 30.9 GM/DL
MCV RBC AUTO: 93.2 FL
MONOCYTES # BLD AUTO: 0.57 K/UL
MONOCYTES NFR BLD AUTO: 12.8 %
NEUTROPHILS # BLD AUTO: 1.84 K/UL
NEUTROPHILS NFR BLD AUTO: 41.2 %
NITRITE URINE: NEGATIVE
PH URINE: 8
PLATELET # BLD AUTO: 249 K/UL
POTASSIUM SERPL-SCNC: 4.9 MMOL/L
PROT SERPL-MCNC: 6.9 G/DL
PROTEIN URINE: NORMAL
RBC # BLD: 4.69 M/UL
RBC # FLD: 18.8 %
SODIUM SERPL-SCNC: 143 MMOL/L
SPECIFIC GRAVITY URINE: 1.03
TRIGL SERPL-MCNC: 51 MG/DL
TSH SERPL-ACNC: 1.19 UIU/ML
UROBILINOGEN URINE: NORMAL
WBC # FLD AUTO: 4.47 K/UL

## 2020-09-25 ENCOUNTER — RX RENEWAL (OUTPATIENT)
Age: 80
End: 2020-09-25

## 2020-11-20 ENCOUNTER — APPOINTMENT (OUTPATIENT)
Dept: SURGICAL ONCOLOGY | Facility: CLINIC | Age: 80
End: 2020-11-20
Payer: MEDICARE

## 2020-11-20 VITALS
OXYGEN SATURATION: 98 % | BODY MASS INDEX: 28.28 KG/M2 | SYSTOLIC BLOOD PRESSURE: 157 MMHG | HEIGHT: 66 IN | HEART RATE: 108 BPM | DIASTOLIC BLOOD PRESSURE: 90 MMHG | WEIGHT: 176 LBS

## 2020-11-20 PROCEDURE — 99205 OFFICE O/P NEW HI 60 MIN: CPT

## 2020-11-23 NOTE — REASON FOR VISIT
[Initial Consultation] : an initial consultation for [Other: _____] : [unfilled] [FreeTextEntry2] : Right ankle squamous cell carcinoma

## 2020-11-23 NOTE — PHYSICAL EXAM
[Normal] : supple, no neck mass and thyroid not enlarged [Normal Neck Lymph Nodes] : normal neck lymph nodes  [Normal Supraclavicular Lymph Nodes] : normal supraclavicular lymph nodes [Normal Groin Lymph Nodes] : normal groin lymph nodes [Normal Axillary Lymph Nodes] : normal axillary lymph nodes [Normal] : full range of motion and no deformities appreciated [de-identified] : Below

## 2020-11-23 NOTE — ASSESSMENT
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.\par \par For preoperative extent of disease evaluation I suggested:\par Chest x-ray:\par Leg x-ray:\par Sonogram of the groin.\par \par She is not sure if she is willing to go for the diagnostic procedures, but allowed me to enter the prescriptions.\par \par I also introduced the possibility of primary radiation therapy for treatment, she is not sure if she wants to pursue any therapy at all.\par \par If she goes for the suggested imaging, I will call her with the results, and decide on further management.\par \par Reviewed in detail, all questions answered.\par \par Note dictated\par \par \par 11-23-20:\par 1.  I spoke to her infectious disease specialist from [facility] (Dr. Matthew Boyd: [phone]).\par I updated him with my clinical impression, and recommendations.\par All questions answered.\par 2.  When patient was contacted by [facility], to schedule the above diagnostic tests, she indicated that she was not ready to do so, and will contact us when she would like to proceed.

## 2020-11-23 NOTE — REVIEW OF SYSTEMS
[Negative] : Heme/Lymph [FreeTextEntry5] : Hypertension [FreeTextEntry6] : Bronchitis [FreeTextEntry7] : Infected knee prostheses [de-identified] : Squamous cancer of the skin

## 2020-11-23 NOTE — HISTORY OF PRESENT ILLNESS
[de-identified] : 80-year-old lady referred by dermatology (Dr. 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.\par \par October 2020, she was evaluated with an 8-month history of a growth involving her left ankle.\par \par The appearance of this growth apparently waxes and wanes.\par October 2020 it apparently appeared more prominent.\par Now she thinks it looks a lot less significant.\par \par No prior personal history of skin cancer.\par \par No prior personal history of malignancy.\par \par The question has been raised of whether this is a squamous cell carcinoma arising from chronic irritation from swelling and lymphedema associated with the infected knee prostheses.\par Irrespective, the pathologic report unequivocally states squamous cell carcinoma is the diagnosis.\par \par 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 Dorado.\par Orthopedics: [facility]: Dr. Bunny PAYAN.\par Original surgery:\par 2007: Left TKR.\par 2008: Right TKR.\par \par Between October 2019 and March 2020 she had several bilateral knee operations for infected prosthesis, at hospitals.\par Presently she has braces on the right, and a revised replacement on her left.\par \par \par Dermatology: Dr. Avila MCCRACKEN.\par Mohs surgeon: Dr. Juliana HICKEY.\par \par \par PMD: Dr. Dave HAGEN.\par \par Dermatology: Dr. Ez HERNANDEZ.\par \par No pacemaker or defibrillator.\par No anticoagulants.\par \par Blood pressure is controlled with amlodipine.\par Atorvastatin for hyperlipidemia.\par She does not see a cardiologist\par \par Pulmonary: Dr. Bran POMPA.\par + Asthmatic bronchitis\par Ellipta, and Ventolin inhalers, and Lasix.\par \par \par Last visit to her gynecologist was in 2017 (Dr. Teresa GALVAN)\par \par \par Bilateral mammogram and sonogram::\par August 2020 at [facility] radiology: BI-RADS 2\par \par \par February 2016:\par Sigmoidoscopy (Dr. Roland Beckman):\par Limited study, poor prep.\par In 2017 she had a colonoscopy in [place] which was reportedly normal

## 2020-12-16 ENCOUNTER — RX RENEWAL (OUTPATIENT)
Age: 80
End: 2020-12-16

## 2021-01-25 NOTE — ED ADULT NURSE NOTE - PAIN RATING/NUMBER SCALE (0-10): ACTIVITY
Patient : Zak Davila Age: 21 year old Sex: male   MRN: [id number] Encounter Date: 1/25/2021    E10/10    History     Chief Complaint   Patient presents with   • Penis Pain     HPI  1/25/2021  3:36 PM Zak Davila is a 21 year old male who presents to the ED for evaluation of small laceration to frenulum. Patient states that he was having intercourse last night when he felt some pain to his penis. He then noticed some bleeding from the frenulum. It has continued to bleed all day today. He denies any problems urinating or blood in his urine. There are no further complaints or concerns at this time.     PCP: No Pcp    No Known Allergies    Current Discharge Medication List      Prior to Admission Medications    Details   sulfamethoxazole-trimethoprim (BACTRIM DS) 800-160 MG per tablet Take 1 tablet by mouth 2 times daily.  Qty: 20 tablet, Refills: 0      mupirocin (BACTROBAN) 2 % ointment Apply topically 3 times daily.  Qty: 22 g, Refills: 0      albuterol 108 (90 Base) MCG/ACT inhaler Inhale 4 puffs into the lungs.      ibuprofen (MOTRIN) 200 MG tablet Take 400 mg by mouth.             No past medical history on file.    No past surgical history on file.    No family history on file.    Social History     Tobacco Use   • Smoking status: Never Smoker   • Smokeless tobacco: Never Used   Substance Use Topics   • Alcohol use: Not Currently   • Drug use: Never       E-cigarette/Vaping   • E-Cigarette/Vaping Use Never Used      E-Cigarette/Vaping Substances & Devices       Review of Systems   Constitutional: Negative for chills and fever.   HENT: Negative for congestion.    Eyes: Negative for redness.   Respiratory: Negative for cough and shortness of breath.    Cardiovascular: Negative for chest pain.   Gastrointestinal: Negative for abdominal pain, blood in stool, diarrhea, nausea and vomiting.   Genitourinary: Negative for dysuria and hematuria.        Positive for small laceration to frenulum    Musculoskeletal: Negative for back pain.   Skin: Negative for rash.   Neurological: Negative for weakness.       Physical Exam     ED Triage Vitals [01/25/21 1353]   ED Triage Vitals Group      Temp 97.3 °F (36.3 °C)      Heart Rate 91      Resp 16      BP (!) 155/80      SpO2 99 %      EtCO2 mmHg       Height 6' 3\" (1.905 m)      Weight (!) 258 lb 6.4 oz (117.2 kg)      Weight Scale Used Standing scale      BMI (Calculated) 32.3      IBW/kg (Calculated) 84.5       Physical Exam   Constitutional: He is oriented to person, place, and time. He appears well-developed. No distress.   HENT:   Head: Normocephalic and atraumatic.   Nose: Nose normal.   Mouth/Throat: Oropharynx is clear and moist.   Eyes: Conjunctivae are normal.   Neck: Trachea normal and normal range of motion. Neck supple. No neck rigidity. Normal range of motion present.   Cardiovascular: Normal rate, regular rhythm, normal heart sounds and normal pulses. Exam reveals no gallop and no friction rub.   No murmur heard.  Pulmonary/Chest: Effort normal and breath sounds normal. No stridor. No respiratory distress. He has no wheezes. He has no rales.   Abdominal: Soft. Bowel sounds are normal. He exhibits no distension and no mass. There is no abdominal tenderness. There is no rebound and no guarding.   Genitourinary:    Genitourinary Comments: Small abrasion/laceration to frenulum. Bleeding is controlled. No swelling or edema. Foreskin is easily retractable. No signs of infection     Musculoskeletal: Normal range of motion.         General: No tenderness or edema.      Comments: No Bilateral calf tenderness    Negative Bilateral Bob's sign   Neurological: He is alert and oriented to person, place, and time. He has normal strength. No cranial nerve deficit or sensory deficit. GCS eye subscore is 4. GCS verbal subscore is 5. GCS motor subscore is 6.   Skin: Skin is warm and dry. No rash noted. No erythema. No pallor.   Psychiatric: He has a normal mood  and affect. His speech is normal and behavior is normal.   Nursing note and vitals reviewed.      ED Course     Procedures    Lab Results     No results found for this visit on 01/25/21.    EKG Results       Radiology Results     Imaging Results    None         ED Medication Orders (From admission, onward)    None               [facility]  Vitals  Vitals:    01/25/21 1353   BP: (!) 155/80   BP Location: RUE - Right upper extremity   Patient Position: Sitting   Pulse: 91   Resp: 16   Temp: 97.3 °F (36.3 °C)   TempSrc: Temporal   SpO2: 99%   Weight: (!) 117.2 kg   Height: 6' 3\" (1.905 m)       ED Course    Review: I reviewed the patient's medications, allergies, and past medical and surgical history in Epic.     Initial Impression:  Zak Davila is a 21 year old male who presents with a small laceration to his penis. Upon physical examination, the patient exhibits a small laceration to the frenulum. The bleeding is controlled at this time. I discussed with the patient the plan to evaluate their symptoms with urology consult. The patient understands and agrees with the plan of care. All additional questions and concerns have been addressed at this time.    3:37 PM I spoke with the PA for urology, regarding the patient's presentation, and the ED work up. She will come evaluate the patient in the ED.     3:39 PM Patient is resting comfortably. I updated him that the urology PA will evaluate him. He is agreeable with the plan. All questions have been addressed.    3:45 PM I spoke with the urology PA. She examined patient and feels that he is okay to return home without further intervention at this time.    3:50 PM I rechecked patient and discussed plan to return home. I explained that he should return if pain or bleeding worsens. Patient expresses understanding and is agreeable with plan. All further questions have been addressed.      [facility]  Critical Care time spent on this patient outside of billable procedures:   None    Clinical Impression  ED Diagnosis        Final diagnosis    Injury to penis, initial encounter                Follow Up:  [facility]  [address]  [facility] [address]  [phone]    for bleeding or pain    Claudia Baxter MD  [address]  [address]  [facility] [address]  [phone]      if needed          Summary of your Discharge Medications      You have not been prescribed any medications.         Pt is discharged to home/self care in stable condition.     I have reviewed the information recorded by the scribe for accuracy and agree with its contents.    ____________________________________________________________________    Maya Claros acting as a scribe for Dr. Mick Rodriguez  Dictation #  [id number]  Scribe: Maya Rodriguez MD  01/25/21 9553     5

## 2021-04-28 ENCOUNTER — OUTPATIENT (OUTPATIENT)
Dept: OUTPATIENT SERVICES | Facility: HOSPITAL | Age: 81
LOS: 1 days | End: 2021-04-28
Payer: MEDICARE

## 2021-04-28 DIAGNOSIS — C44.92 SQUAMOUS CELL CARCINOMA OF SKIN, UNSPECIFIED: ICD-10-CM

## 2021-04-29 ENCOUNTER — RESULT REVIEW (OUTPATIENT)
Age: 81
End: 2021-04-29

## 2021-04-29 PROCEDURE — 88321 CONSLTJ&REPRT SLD PREP ELSWR: CPT

## 2021-04-29 NOTE — OCCUPATIONAL THERAPY INITIAL EVALUATION ADULT - BED MOBILITY LIMITATIONS, REHAB EVAL
[facility] calling, she has been discharged from home PT she met all of her goals decreased ability to use legs for bridging/pushing/decreased ability to use arms for pushing/pulling

## 2021-04-30 LAB — SURGICAL PATHOLOGY STUDY: SIGNIFICANT CHANGE UP

## 2021-05-03 ENCOUNTER — APPOINTMENT (OUTPATIENT)
Dept: INTERNAL MEDICINE | Facility: CLINIC | Age: 81
End: 2021-05-03
Payer: MEDICARE

## 2021-05-03 ENCOUNTER — NON-APPOINTMENT (OUTPATIENT)
Age: 81
End: 2021-05-03

## 2021-05-03 VITALS
OXYGEN SATURATION: 96 % | DIASTOLIC BLOOD PRESSURE: 80 MMHG | HEART RATE: 100 BPM | SYSTOLIC BLOOD PRESSURE: 150 MMHG | RESPIRATION RATE: 22 BRPM

## 2021-05-03 VITALS — WEIGHT: 180 LBS | TEMPERATURE: 98 F | HEIGHT: 66 IN | BODY MASS INDEX: 28.93 KG/M2

## 2021-05-03 DIAGNOSIS — R06.00 DYSPNEA, UNSPECIFIED: ICD-10-CM

## 2021-05-03 DIAGNOSIS — R00.0 TACHYCARDIA, UNSPECIFIED: ICD-10-CM

## 2021-05-03 PROCEDURE — 93000 ELECTROCARDIOGRAM COMPLETE: CPT

## 2021-05-03 PROCEDURE — 99214 OFFICE O/P EST MOD 30 MIN: CPT | Mod: 25

## 2021-05-03 RX ORDER — FLUTICASONE FUROATE 200 UG/1
200 POWDER RESPIRATORY (INHALATION) DAILY
Qty: 1 | Refills: 0 | Status: DISCONTINUED | COMMUNITY
Start: 2020-02-12 | End: 2021-05-03

## 2021-05-03 RX ORDER — HYDROXYCHLOROQUINE SULFATE 200 MG/1
200 TABLET, FILM COATED ORAL
Refills: 0 | Status: ACTIVE | COMMUNITY
Start: 2021-05-03

## 2021-05-03 NOTE — HISTORY OF PRESENT ILLNESS
[FreeTextEntry1] : Has been having dyspnea on exertion and now worse - \par No pleuritic pain\par No chest pain\par ? legs swollen\par No appetitions\par No n,v\par No BRBPR or melena\par

## 2021-05-03 NOTE — PHYSICAL EXAM
[Normal Sclera/Conjunctiva] : normal sclera/conjunctiva [No JVD] : no jugular venous distention [No Lymphadenopathy] : no lymphadenopathy [Supple] : supple [Clear to Auscultation] : lungs were clear to auscultation bilaterally [Normal Rate] : normal rate  [Regular Rhythm] : with a regular rhythm [Normal S1, S2] : normal S1 and S2 [No Murmur] : no murmur heard [Soft] : abdomen soft [Non Tender] : non-tender [Non-distended] : non-distended [No Masses] : no abdominal mass palpated [No HSM] : no HSM [Normal Bowel Sounds] : normal bowel sounds [Normal Supraclavicular Nodes] : no supraclavicular lymphadenopathy [Normal Posterior Cervical Nodes] : no posterior cervical lymphadenopathy [Normal Anterior Cervical Nodes] : no anterior cervical lymphadenopathy [Alert and Oriented x3] : oriented to person, place, and time [de-identified] : Tachypneic  [de-identified] : Tachypneic  [de-identified] : Right leg with 1+ edema. Left leg trace edema

## 2021-05-03 NOTE — ASSESSMENT
[FreeTextEntry1] : Pt with significant LANGE- ? cardiac vs pulmonary\par Needs to be evaluated in ER - pt will not let us call an ambulance but promises to go to ER at [facility].\par Have explained the serious nature of this problem.\par

## 2021-05-03 NOTE — REVIEW OF SYSTEMS
[Dyspnea on Exertion] : dyspnea on exertion [Negative] : Gastrointestinal [Fever] : no fever [Chest Pain] : no chest pain [Palpitations] : no palpitations

## 2021-05-05 ENCOUNTER — NON-APPOINTMENT (OUTPATIENT)
Age: 81
End: 2021-05-05

## 2021-05-07 ENCOUNTER — RX RENEWAL (OUTPATIENT)
Age: 81
End: 2021-05-07

## 2021-05-19 NOTE — PROGRESS NOTE ADULT - ASSESSMENT
77 yo woman with a hx of a fall two weeks ago present with pain and swelling of the left knee found to have an infection of the left knee  periprosthetic infection. S/p left knee washout and polyethylene liner exchange on 10/5. IN ICU for close post op care for cardiopulmonary issues.  Patient currently stable and ready for transfer to regular floor . Terbinafine Counseling: Patient counseling regarding adverse effects of terbinafine including but not limited to headache, diarrhea, rash, upset stomach, liver function test abnormalities, itching, taste/smell disturbance, nausea, abdominal pain, and flatulence.  There is a rare possibility of liver failure that can occur when taking terbinafine.  The patient understands that a baseline LFT and kidney function test may be required. The patient verbalized understanding of the proper use and possible adverse effects of terbinafine.  All of the patient's questions and concerns were addressed.

## 2021-06-13 ENCOUNTER — RX RENEWAL (OUTPATIENT)
Age: 81
End: 2021-06-13

## 2021-07-08 ENCOUNTER — APPOINTMENT (OUTPATIENT)
Dept: INTERNAL MEDICINE | Facility: CLINIC | Age: 81
End: 2021-07-08
Payer: MEDICARE

## 2021-07-08 PROCEDURE — 36415 COLL VENOUS BLD VENIPUNCTURE: CPT

## 2021-07-09 LAB
25(OH)D3 SERPL-MCNC: 17.5 NG/ML
ALBUMIN SERPL ELPH-MCNC: 3.9 G/DL
ALP BLD-CCNC: 196 U/L
ALT SERPL-CCNC: 26 U/L
ANION GAP SERPL CALC-SCNC: 13 MMOL/L
APPEARANCE: CLEAR
AST SERPL-CCNC: 28 U/L
BILIRUB SERPL-MCNC: 0.2 MG/DL
BILIRUBIN URINE: NEGATIVE
BLOOD URINE: NEGATIVE
BUN SERPL-MCNC: 20 MG/DL
CALCIUM SERPL-MCNC: 9.5 MG/DL
CHLORIDE SERPL-SCNC: 108 MMOL/L
CHOLEST SERPL-MCNC: 225 MG/DL
CO2 SERPL-SCNC: 20 MMOL/L
COLOR: YELLOW
CREAT SERPL-MCNC: 0.88 MG/DL
ESTIMATED AVERAGE GLUCOSE: NORMAL MG/DL
GLUCOSE QUALITATIVE U: NEGATIVE
GLUCOSE SERPL-MCNC: 109 MG/DL
HBA1C MFR BLD HPLC: NORMAL
HDLC SERPL-MCNC: 106 MG/DL
KETONES URINE: NEGATIVE
LDLC SERPL CALC-MCNC: 98 MG/DL
LEUKOCYTE ESTERASE URINE: NEGATIVE
NITRITE URINE: NEGATIVE
NONHDLC SERPL-MCNC: 119 MG/DL
PH URINE: 7.5
POTASSIUM SERPL-SCNC: 5.6 MMOL/L
PROT SERPL-MCNC: 6.3 G/DL
PROTEIN URINE: NORMAL
SODIUM SERPL-SCNC: 141 MMOL/L
SPECIFIC GRAVITY URINE: 1.03
T4 SERPL-MCNC: 5.5 UG/DL
TRIGL SERPL-MCNC: 103 MG/DL
TSH SERPL-ACNC: 0.99 UIU/ML
UROBILINOGEN URINE: NORMAL

## 2021-07-15 ENCOUNTER — LABORATORY RESULT (OUTPATIENT)
Age: 81
End: 2021-07-15

## 2021-07-15 ENCOUNTER — APPOINTMENT (OUTPATIENT)
Dept: INTERNAL MEDICINE | Facility: CLINIC | Age: 81
End: 2021-07-15
Payer: MEDICARE

## 2021-07-15 VITALS
HEART RATE: 80 BPM | WEIGHT: 178 LBS | SYSTOLIC BLOOD PRESSURE: 130 MMHG | BODY MASS INDEX: 24.11 KG/M2 | HEIGHT: 72 IN | DIASTOLIC BLOOD PRESSURE: 80 MMHG | RESPIRATION RATE: 16 BRPM

## 2021-07-15 VITALS — WEIGHT: 178 LBS | HEIGHT: 72 IN | BODY MASS INDEX: 24.11 KG/M2

## 2021-07-15 DIAGNOSIS — Z00.00 ENCOUNTER FOR GENERAL ADULT MEDICAL EXAMINATION W/OUT ABNORMAL FINDINGS: ICD-10-CM

## 2021-07-15 DIAGNOSIS — M35.00 SICCA SYNDROME, UNSPECIFIED: ICD-10-CM

## 2021-07-15 DIAGNOSIS — Z96.651 PAIN DUE TO INTERNAL ORTHOPEDIC PROSTHETIC DEVICES, IMPLANTS AND GRAFTS, INITIAL ENCOUNTER: ICD-10-CM

## 2021-07-15 DIAGNOSIS — T84.84XA PAIN DUE TO INTERNAL ORTHOPEDIC PROSTHETIC DEVICES, IMPLANTS AND GRAFTS, INITIAL ENCOUNTER: ICD-10-CM

## 2021-07-15 DIAGNOSIS — T84.59XA INFECTION AND INFLAMMATORY REACTION DUE TO OTHER INTERNAL JOINT PROSTHESIS, INITIAL ENCOUNTER: ICD-10-CM

## 2021-07-15 DIAGNOSIS — Z96.659 INFECTION AND INFLAMMATORY REACTION DUE TO OTHER INTERNAL JOINT PROSTHESIS, INITIAL ENCOUNTER: ICD-10-CM

## 2021-07-15 PROCEDURE — 99497 ADVNCD CARE PLAN 30 MIN: CPT

## 2021-07-15 PROCEDURE — G0444 DEPRESSION SCREEN ANNUAL: CPT | Mod: 59

## 2021-07-15 PROCEDURE — 36415 COLL VENOUS BLD VENIPUNCTURE: CPT

## 2021-07-15 PROCEDURE — G0439: CPT

## 2021-07-15 PROCEDURE — 99213 OFFICE O/P EST LOW 20 MIN: CPT | Mod: 25

## 2021-07-15 NOTE — REVIEW OF SYSTEMS
[Negative] : Heme/Lymph [FreeTextEntry3] : last optho - 9/20 [FreeTextEntry9] : sees ortho [de-identified] : sees derm [de-identified] : on meds

## 2021-07-15 NOTE — HISTORY OF PRESENT ILLNESS
[FreeTextEntry1] : Well visit and follow up for management of:\par Hyperlipidemia - on meds\par Hypertension - on meds\par Low Vit D - on meds\par Anxiety-depression - on meds

## 2021-07-15 NOTE — ASSESSMENT
[FreeTextEntry1] : Pt with above medical issues which requires extra work in addition to the well visit.\par Will recheck CBC and A1C - clotted tube\par Meds adjusted\par FU orthopedist and ID\par FU 6 months

## 2021-07-15 NOTE — PHYSICAL EXAM
[No Acute Distress] : no acute distress [Well Nourished] : well nourished [Well Developed] : well developed [Well-Appearing] : well-appearing [Normal Sclera/Conjunctiva] : normal sclera/conjunctiva [PERRL] : pupils equal round and reactive to light [EOMI] : extraocular movements intact [Normal Outer Ear/Nose] : the outer ears and nose were normal in appearance [Normal Oropharynx] : the oropharynx was normal [Normal TMs] : both tympanic membranes were normal [No JVD] : no jugular venous distention [No Lymphadenopathy] : no lymphadenopathy [Supple] : supple [Thyroid Normal, No Nodules] : the thyroid was normal and there were no nodules present [No Respiratory Distress] : no respiratory distress  [No Accessory Muscle Use] : no accessory muscle use [Clear to Auscultation] : lungs were clear to auscultation bilaterally [Normal Rate] : normal rate  [Regular Rhythm] : with a regular rhythm [Normal S1, S2] : normal S1 and S2 [No Murmur] : no murmur heard [No Carotid Bruits] : no carotid bruits [No Abdominal Bruit] : a ~M bruit was not heard ~T in the abdomen [No Varicosities] : no varicosities [Pedal Pulses Present] : the pedal pulses are present [No Edema] : there was no peripheral edema [No Palpable Aorta] : no palpable aorta [No Extremity Clubbing/Cyanosis] : no extremity clubbing/cyanosis [Normal Appearance] : normal in appearance [No Nipple Discharge] : no nipple discharge [No Axillary Lymphadenopathy] : no axillary lymphadenopathy [Soft] : abdomen soft [Non Tender] : non-tender [Non-distended] : non-distended [No Masses] : no abdominal mass palpated [No HSM] : no HSM [Normal Bowel Sounds] : normal bowel sounds [Normal Posterior Cervical Nodes] : no posterior cervical lymphadenopathy [Normal Anterior Cervical Nodes] : no anterior cervical lymphadenopathy [No CVA Tenderness] : no CVA  tenderness [No Spinal Tenderness] : no spinal tenderness [No Joint Swelling] : no joint swelling [Grossly Normal Strength/Tone] : grossly normal strength/tone [No Rash] : no rash [Coordination Grossly Intact] : coordination grossly intact [No Focal Deficits] : no focal deficits [Normal Gait] : normal gait [Deep Tendon Reflexes (DTR)] : deep tendon reflexes were 2+ and symmetric [Normal Affect] : the affect was normal [Normal Insight/Judgement] : insight and judgment were intact [Normal Supraclavicular Nodes] : no supraclavicular lymphadenopathy [Normal Axillary Nodes] : no axillary lymphadenopathy [Normal] : affect was normal and insight and judgment were intact [FreeTextEntry1] : Deferred to GYN [de-identified] : Deferred to GYN

## 2021-07-15 NOTE — HEALTH RISK ASSESSMENT
[Fair] :  ~his/her~ mood as fair [No] : No [No falls in past year] : Patient reported no falls in the past year [1] : 1) Little interest or pleasure doing things for several days (1) [0] : 2) Feeling down, depressed, or hopeless: Not at all (0) [PHQ-2 Negative - No further assessment needed] : PHQ-2 Negative - No further assessment needed [None] : None [Alone] : lives alone [Retired] : retired [Feels Safe at Home] : Feels safe at home [Fully functional (bathing, dressing, toileting, transferring, walking, feeding)] : Fully functional (bathing, dressing, toileting, transferring, walking, feeding) [Fully functional (using the telephone, shopping, preparing meals, housekeeping, doing laundry, using] : Fully functional and needs no help or supervision to perform IADLs (using the telephone, shopping, preparing meals, housekeeping, doing laundry, using transportation, managing medications and managing finances) [Smoke Detector] : smoke detector [Carbon Monoxide Detector] : carbon monoxide detector [Seat Belt] :  uses seat belt [Sunscreen] : uses sunscreen [With Patient/Caregiver] : , with patient/caregiver [Designated Healthcare Proxy] : Designated healthcare proxy [Name: ___] : Health Care Proxy's Name: [unfilled]  [Time Spent: ___ minutes] : Time Spent: [unfilled] minutes [] : No [de-identified] : Walking [de-identified] : Low fat [Change in mental status noted] : No change in mental status noted [AdvancecareDate] : 07/21 [FreeTextEntry4] : Had long discussion with the patient.\par Discussed with pt the need for a health care proxy.\par Discussed who can be a proxy, forms given if needed, and the need for the proxy to know their wishes and to make sure they will comply.\par The proxy will need to have a copy in their home and the patient should have a copy.\par \par Son is proxy - wishes are known

## 2021-07-16 LAB
BASOPHILS # BLD AUTO: 0 K/UL
BASOPHILS NFR BLD AUTO: 0 %
EOSINOPHIL # BLD AUTO: 1.49 K/UL
EOSINOPHIL NFR BLD AUTO: 25.9 %
ESTIMATED AVERAGE GLUCOSE: 114 MG/DL
HBA1C MFR BLD HPLC: 5.6 %
HCT VFR BLD CALC: 42.9 %
HGB BLD-MCNC: 14.3 G/DL
LYMPHOCYTES # BLD AUTO: 1.18 K/UL
LYMPHOCYTES NFR BLD AUTO: 20.5 %
MAN DIFF?: NORMAL
MCHC RBC-ENTMCNC: 31.1 PG
MCHC RBC-ENTMCNC: 33.3 GM/DL
MCV RBC AUTO: 93.3 FL
MONOCYTES # BLD AUTO: 0.98 K/UL
MONOCYTES NFR BLD AUTO: 17 %
NEUTROPHILS # BLD AUTO: 1.95 K/UL
NEUTROPHILS NFR BLD AUTO: 33.9 %
PLATELET # BLD AUTO: 266 K/UL
RBC # BLD: 4.6 M/UL
RBC # FLD: 13.4 %
WBC # FLD AUTO: 5.75 K/UL

## 2021-10-13 NOTE — ED ADULT NURSE NOTE - CAS TRG GEN SKIN CONDITION
Warm/Dry Composite Graft Text: The defect edges were debeveled with a #15 scalpel blade.  Given the location of the defect, shape of the defect, the proximity to free margins and the fact the defect was full thickness a composite graft was deemed most appropriate.  The defect was outline and then transferred to the donor site.  A full thickness graft was then excised from the donor site. The graft was then placed in the primary defect, oriented appropriately and then sutured into place.  The secondary defect was then repaired using a primary closure.

## 2021-10-25 ENCOUNTER — RX RENEWAL (OUTPATIENT)
Age: 81
End: 2021-10-25

## 2021-11-15 ENCOUNTER — APPOINTMENT (OUTPATIENT)
Dept: INTERNAL MEDICINE | Facility: CLINIC | Age: 81
End: 2021-11-15
Payer: MEDICARE

## 2021-11-15 VITALS
BODY MASS INDEX: 26.37 KG/M2 | DIASTOLIC BLOOD PRESSURE: 86 MMHG | WEIGHT: 168 LBS | SYSTOLIC BLOOD PRESSURE: 130 MMHG | HEIGHT: 67 IN

## 2021-11-15 DIAGNOSIS — Z63.4 DISAPPEARANCE AND DEATH OF FAMILY MEMBER: ICD-10-CM

## 2021-11-15 DIAGNOSIS — C44.92 SQUAMOUS CELL CARCINOMA OF SKIN, UNSPECIFIED: ICD-10-CM

## 2021-11-15 DIAGNOSIS — Z01.818 ENCOUNTER FOR OTHER PREPROCEDURAL EXAMINATION: ICD-10-CM

## 2021-11-15 PROCEDURE — 99213 OFFICE O/P EST LOW 20 MIN: CPT

## 2021-11-15 RX ORDER — MELOXICAM 15 MG/1
15 TABLET ORAL
Qty: 30 | Refills: 0 | Status: DISCONTINUED | COMMUNITY
Start: 2020-02-06 | End: 2021-11-15

## 2021-11-15 SDOH — SOCIAL STABILITY - SOCIAL INSECURITY: DISSAPEARANCE AND DEATH OF FAMILY MEMBER: Z63.4

## 2021-11-15 NOTE — HISTORY OF PRESENT ILLNESS
[Good (7-10 METs)] : Good (7-10 METs) [Self] : no previous adverse anesthesia reaction [FreeTextEntry1] : squamous cell Moh's surgery  [FreeTextEntry2] : November 16, 2021 [FreeTextEntry3] : Dr. Frankel  [FreeTextEntry4] : \par \par P: [phone]\par \par FAX: [phone]\par

## 2021-11-15 NOTE — RESULTS/DATA
[] : results reviewed [de-identified] : 11/15/21wnl  [de-identified] : 11/15/21 angie [de-identified] : 11/15/21 CMP wnl except ALK PHOS 181 [de-identified] : 11/15/21 CXR mary  [de-identified] : 11/15/21 NSR with RBB, LAD - no changes from ECG done on 7/14/20

## 2021-11-15 NOTE — ASSESSMENT
[Patient Optimized for Surgery] : Patient optimized for surgery [FreeTextEntry7] : No further NSAIDs. Last alleve dose 3 days ago and I spoke w/ Dr. Frankel's office - okay to proceed w/ surgery

## 2021-11-15 NOTE — HISTORY OF PRESENT ILLNESS
[Good (7-10 METs)] : Good (7-10 METs) [Self] : no previous adverse anesthesia reaction [FreeTextEntry1] : squamous cell Moh's surgery  [FreeTextEntry2] : November 16, 2021 [FreeTextEntry3] : Dr. Frankel  [FreeTextEntry4] : \par \par P: [phone]\par \par FAX: [phone]\par

## 2021-11-15 NOTE — RESULTS/DATA
[] : results reviewed [de-identified] : 11/15/21wnl  [de-identified] : 11/15/21 angie [de-identified] : 11/15/21 CXR mary  [de-identified] : 11/15/21 CMP wnl except ALK PHOS 181 [de-identified] : 11/15/21 NSR with RBB, LAD - no changes from ECG done on 7/14/20

## 2021-11-15 NOTE — PHYSICAL EXAM
[No Acute Distress] : no acute distress [Well Nourished] : well nourished [Normal Affect] : the affect was normal [Normal Insight/Judgement] : insight and judgment were intact [No Accessory Muscle Use] : no accessory muscle use [Clear to Auscultation] : lungs were clear to auscultation bilaterally [Regular Rhythm] : with a regular rhythm [Normal S1, S2] : normal S1 and S2 [No Murmur] : no murmur heard [Soft] : abdomen soft [Non Tender] : non-tender [Non-distended] : non-distended [No Masses] : no abdominal mass palpated [No HSM] : no HSM [Normal Bowel Sounds] : normal bowel sounds

## 2022-01-11 ENCOUNTER — APPOINTMENT (OUTPATIENT)
Dept: INTERNAL MEDICINE | Facility: CLINIC | Age: 82
End: 2022-01-11
Payer: MEDICARE

## 2022-01-11 PROCEDURE — 36415 COLL VENOUS BLD VENIPUNCTURE: CPT

## 2022-01-12 ENCOUNTER — RX RENEWAL (OUTPATIENT)
Age: 82
End: 2022-01-12

## 2022-01-12 LAB
ALBUMIN SERPL ELPH-MCNC: 4 G/DL
ALP BLD-CCNC: 207 U/L
ALT SERPL-CCNC: 21 U/L
ANION GAP SERPL CALC-SCNC: 12 MMOL/L
AST SERPL-CCNC: 20 U/L
BASOPHILS # BLD AUTO: 0.06 K/UL
BASOPHILS NFR BLD AUTO: 1.6 %
BILIRUB SERPL-MCNC: <0.2 MG/DL
BUN SERPL-MCNC: 21 MG/DL
CALCIUM SERPL-MCNC: 9.2 MG/DL
CHLORIDE SERPL-SCNC: 106 MMOL/L
CHOLEST SERPL-MCNC: 210 MG/DL
CO2 SERPL-SCNC: 22 MMOL/L
CREAT SERPL-MCNC: 0.73 MG/DL
EOSINOPHIL # BLD AUTO: 0.27 K/UL
EOSINOPHIL NFR BLD AUTO: 7.1 %
ESTIMATED AVERAGE GLUCOSE: 117 MG/DL
GLUCOSE SERPL-MCNC: 94 MG/DL
HBA1C MFR BLD HPLC: 5.7 %
HCT VFR BLD CALC: 37.8 %
HDLC SERPL-MCNC: 117 MG/DL
HGB BLD-MCNC: 11.7 G/DL
IMM GRANULOCYTES NFR BLD AUTO: 0 %
LDLC SERPL CALC-MCNC: 79 MG/DL
LYMPHOCYTES # BLD AUTO: 1.14 K/UL
LYMPHOCYTES NFR BLD AUTO: 29.8 %
MAN DIFF?: NORMAL
MCHC RBC-ENTMCNC: 29.5 PG
MCHC RBC-ENTMCNC: 31 GM/DL
MCV RBC AUTO: 95.5 FL
MONOCYTES # BLD AUTO: 0.47 K/UL
MONOCYTES NFR BLD AUTO: 12.3 %
NEUTROPHILS # BLD AUTO: 1.88 K/UL
NEUTROPHILS NFR BLD AUTO: 49.2 %
NONHDLC SERPL-MCNC: 93 MG/DL
PLATELET # BLD AUTO: 291 K/UL
POTASSIUM SERPL-SCNC: 4.7 MMOL/L
PROT SERPL-MCNC: 6.4 G/DL
RBC # BLD: 3.96 M/UL
RBC # FLD: 13 %
SODIUM SERPL-SCNC: 140 MMOL/L
TRIGL SERPL-MCNC: 72 MG/DL
WBC # FLD AUTO: 3.82 K/UL

## 2022-01-12 RX ORDER — AMLODIPINE BESYLATE 5 MG/1
5 TABLET ORAL
Qty: 90 | Refills: 1 | Status: ACTIVE | COMMUNITY
Start: 2020-02-13 | End: 1900-01-01

## 2022-01-25 ENCOUNTER — APPOINTMENT (OUTPATIENT)
Dept: INTERNAL MEDICINE | Facility: CLINIC | Age: 82
End: 2022-01-25
Payer: MEDICARE

## 2022-01-25 VITALS — SYSTOLIC BLOOD PRESSURE: 140 MMHG | RESPIRATION RATE: 16 BRPM | HEART RATE: 80 BPM | DIASTOLIC BLOOD PRESSURE: 80 MMHG

## 2022-01-25 DIAGNOSIS — R60.0 LOCALIZED EDEMA: ICD-10-CM

## 2022-01-25 DIAGNOSIS — I10 ESSENTIAL (PRIMARY) HYPERTENSION: ICD-10-CM

## 2022-01-25 DIAGNOSIS — E78.5 HYPERLIPIDEMIA, UNSPECIFIED: ICD-10-CM

## 2022-01-25 DIAGNOSIS — E55.9 VITAMIN D DEFICIENCY, UNSPECIFIED: ICD-10-CM

## 2022-01-25 PROCEDURE — 99214 OFFICE O/P EST MOD 30 MIN: CPT

## 2022-01-25 RX ORDER — FUROSEMIDE 20 MG/1
20 TABLET ORAL DAILY
Qty: 60 | Refills: 2 | Status: ACTIVE | COMMUNITY
Start: 2016-08-16

## 2022-01-25 RX ORDER — DOXYCYCLINE 100 MG/1
100 TABLET, FILM COATED ORAL DAILY
Qty: 7 | Refills: 0 | Status: ACTIVE | COMMUNITY
Start: 2020-02-06

## 2022-01-25 RX ORDER — AMOXICILLIN AND CLAVULANATE POTASSIUM 875; 125 MG/1; MG/1
875-125 TABLET, COATED ORAL DAILY
Refills: 0 | Status: ACTIVE | COMMUNITY
Start: 2020-02-06

## 2022-01-25 NOTE — ASSESSMENT
[FreeTextEntry1] : Pt with above medical issues.\par Needs to take Lasix 2 tabs every day \par Continue other meds\par Labs reviewed.\par

## 2022-01-25 NOTE — PHYSICAL EXAM
[Normal] : no posterior cervical lymphadenopathy and no anterior cervical lymphadenopathy [de-identified] : 1-2+ edema bilaterally

## 2022-01-25 NOTE — HISTORY OF PRESENT ILLNESS
[FreeTextEntry1] : FU for hypertension, hyperlipidemia, low Vit D\par So-so on diet\par Walking a little\par

## 2022-02-18 NOTE — CONSULT NOTE ADULT - ATTENDING COMMENTS
History     Chief Complaint   Patient presents with   • Chest Pain Adult     HPI  Liz Corbin is a 62 year old female who presents to the ED with chest fluttering.  Onset 2 days ago, the patient states that when at rest she occasionally feels a several seconds of fluttering in the top part of her chest that comes and goes at random.  It is mild and fleeting and non-radiating. She does not notice it with exertion.  She has no chest pain, shortness of breath or pressure with exertion.  She has no radiation of the symptoms.  No arm pain or jaw pain.  No recent fevers chills vomiting diarrhea or cough.  The patient does have a history of CAD, 3 years ago she was having dyspnea on exertion and went to [facility] and was taken to the Cath Lab and had 1 stent placed.  She follows up with a cardiologist at [facility].  She is due to see him this year.  She says the symptoms are nothing like that episode 3 years ago.  She does not think this is coming from her heart but she just wanted to make sure.  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  She does not smoke.  No other complaints.    No primary care provider on file.    ALLERGIES:  Patient has no allergy information on record.    Past Medical History:   Diagnosis Date   • Breast cancer (CMS/[facility])    • Coronary artery disease involving native coronary artery of native heart without angina pectoris    • Diabetes mellitus (CMS/[facility])      There is no problem list on file for this patient.    No past surgical history on file.    No family history on file.    Social History     Tobacco Use   • Smoking status: Never Smoker   • Smokeless tobacco: Never Used   Substance Use Topics   • Alcohol use: Not on file   • Drug use: Not on file    lives with spouse  Employed works in an office     No current outpatient medications    Review of Systems  All systems reviewed and negative except as noted in the HPI  above.    Physical Exam     Patient Vitals for the past 24 hrs:   BP Temp Temp src Pulse Resp SpO2 Height Weight   02/18/22 1336 111/62 -- -- 80 17 -- -- --   02/18/22 1245 105/59 -- -- 76 16 100 % -- --   02/18/22 1115 112/66 -- -- 86 18 100 % -- --   02/18/22 1047 -- 98 °F (36.7 °C) Oral -- -- -- -- --   02/18/22 1046 -- -- -- -- -- -- 5' 3\" (1.6 m) 56.2 kg (124 lb)   02/18/22 1042 132/79 -- -- 91 19 100 % -- --     Pulse Oximetry: > 95% on room air which is normal my interpretation  Cardiac Rhythm Strip: Sinus rhythm, normal rate, no ectopy my interpretation    General Appearance: Awake  Skin: No rash  HEENT: Normocephalic/atraumatic, sclera anicteric, pupils are symmetric  Neck: Normal range of motion  Chest and Lungs: Bilateral breath sounds  Cardiovascular: Regular rate and rhythm  Abdomen: Not distended  Back: Normal  Musculoskeletal: No obvious deformity  Neurologic: Awake, alert, no obvious deficits, moving all extremities, speech is fluent  Psychiatric: Cooperative    ED Course   Procedures    EKG: Sinus rhythm, normal rate, no ectopy or acute ischemia (my interpretation).    Results  Results for orders placed or performed during the hospital encounter of 02/18/22   Comprehensive Metabolic Panel   Result Value    Fasting Status     Sodium 146 (H)    Potassium 3.5    Chloride 107    Carbon Dioxide 27    Anion Gap 16    Glucose 63 (L)    BUN 17    Creatinine 0.64    Glomerular Filtration Rate >90     Comment: eGFR results = or >60 mL/min/1.73m2 = Normal kidney function. Estimated GFR calculated using the 2009 CKD-EPI creatinine equation.      BUN/ Creatinine Ratio 27 (H)    Calcium 9.2    Bilirubin, Total 0.9    GOT/AST 15    GPT/ALT 17    Alkaline Phosphatase 56    Albumin 4.1    Protein, Total 7.5    Globulin 3.4    A/G Ratio 1.2   TROPONIN I, HIGH SENSITIVITY   Result Value    Troponin I, High Sensitivity 5   CBC with Automated Differential (performable only)   Result Value    WBC 5.9    RBC 4.83    HGB  12.5    HCT 40.4    MCV 83.6    MCH 25.9 (L)    MCHC 30.9 (L)    RDW-CV 14.1    RDW-SD 43.2        NRBC 0    Neutrophil, Percent 64    Lymphocytes, Percent 27    Mono, Percent 8    Eosinophils, Percent 1    Basophils, Percent 0    Immature Granulocytes 0    Absolute Neutrophils 3.8    Absolute Lymphocytes 1.6    Absolute Monocytes 0.5    Absolute Eosinophils  0.0    Absolute Basophils 0.0    Absolute Immmature Granulocytes 0.0   TROPONIN I, HIGH SENSITIVITY   Result Value    Troponin I, High Sensitivity 4   GLUCOSE, BEDSIDE - POINT OF CARE   Result Value    GLUCOSE, BEDSIDE - POINT OF CARE 85       XR CHEST PA OR AP 1 VIEW   Final Result   1. No acute cardiopulmonary abnormality, radiographically.      Electronically Signed by: TAMIKO MUNOZ MD    Signed on: 2/18/2022 11:23 AM              ED Medication Orders (From admission, onward)    None        ED Course    Multiple differential diagnoses were considered in the care of this patient including but not limited to MSK chest pain versus GERD versus ACS versus other etiology.     The patient presents with chest fluttering as described above.  Fleeting, not associated with exertion, quite atypical for anginal symptoms.  Heart score calculated as 3. No symptoms at this time.     2:30 PM  The patient was evaluated here with labs including serial troponins and serial EKGs.  These are reassuring.  She remained symptom-free.  I called and spoke with her cardiologist at [facility], he states that he can follow-up with her as an outpatient.  While the patient was in the ED, she received a call from his office and she does have an appointment coming up.  The patient is comfortable going home.    I explained to the patient that an emergency department evaluation is not exhaustive and it is important to follow up promptly with a primary care doctor or specialist. I also advised the patient to return to the ED if symptoms worsen or there are any new concerning  symptoms. The patient expressed understanding of these follow up and return instructions.       CLINICAL IMPRESSION  1. Chest pain, unspecified type          New Prescriptions    No medications on file     2/18/2022  MD Roney Byrnes MD  02/18/22 9254     Contacted by telephone regarding cell count/gram stain. Will require emergent I&D and plastic exchange, but likely to require excision of implants and staged reimplantation if infection cannot be controlled. Patient reportedly wishes to sign out AMA and go to treating surgeon in [facility]. This must be done today.

## 2022-03-08 ENCOUNTER — NON-APPOINTMENT (OUTPATIENT)
Age: 82
End: 2022-03-08

## 2022-03-18 NOTE — ED ADULT NURSE NOTE - ISOLATION TYPE:
HR=83 bpm, VFPL=767/98 mmhg, SpO2=94.0 %, Resp=33 B/min, EtCO2=17 mmHg, Apnea=0 Seconds, Pain=0, Belle=10, Moreno=2 None

## 2022-06-09 NOTE — DISCHARGE NOTE ADULT - CLICK TO LAUNCH ORM
No aspirin for 5 days before surgery, no  Aleve, Advil, Nuprin,  Motrin, or Ibuprofen from seven days prior until after surgery. Acetaminophen/ Tylenol is safe.  No Vitamins,  no supplements, no fish oil, no herbals for one week before procedure until three days after procedure.     BECAUSE OF CORONAVIRUS-19, YOU SHOULD ISOLATE YOURSELF AS MUCH AS POSSIBLE FOR SEVEN DAYS PRIOR TO THE PROCEDURE, TO AVOID HAVING THE VIRUS DURING THE TIME OF YOUR SURGERY AND RECOVERY.     MORNING OF SURGERY ONLY TAKE TEGRETOL AND ATENOLOL AND AMLODIPINE, TAKE NO OTHER OF YOUR REGULAR MEDICATIONS THAT MORNING.  TAKE THESE WITH A SIP OF WATER 3 HOURS BEFORE GOING TO THE HOSPITAL   .

## 2022-07-07 ENCOUNTER — APPOINTMENT (OUTPATIENT)
Dept: INTERNAL MEDICINE | Facility: CLINIC | Age: 82
End: 2022-07-07

## 2022-07-07 PROCEDURE — 36415 COLL VENOUS BLD VENIPUNCTURE: CPT

## 2022-07-08 ENCOUNTER — NON-APPOINTMENT (OUTPATIENT)
Age: 82
End: 2022-07-08

## 2022-07-08 DIAGNOSIS — D64.9 ANEMIA, UNSPECIFIED: ICD-10-CM

## 2022-07-09 ENCOUNTER — NON-APPOINTMENT (OUTPATIENT)
Age: 82
End: 2022-07-09

## 2022-07-09 LAB
ALBUMIN SERPL ELPH-MCNC: 4 G/DL
ALP BLD-CCNC: 241 U/L
ALT SERPL-CCNC: 16 U/L
ANION GAP SERPL CALC-SCNC: 13 MMOL/L
AST SERPL-CCNC: 22 U/L
BASOPHILS # BLD AUTO: 0.07 K/UL
BASOPHILS NFR BLD AUTO: 1.9 %
BILIRUB SERPL-MCNC: <0.2 MG/DL
BUN SERPL-MCNC: 21 MG/DL
CALCIUM SERPL-MCNC: 9.3 MG/DL
CHLORIDE SERPL-SCNC: 106 MMOL/L
CHOLEST SERPL-MCNC: 210 MG/DL
CO2 SERPL-SCNC: 21 MMOL/L
CREAT SERPL-MCNC: 0.64 MG/DL
EGFR: 89 ML/MIN/1.73M2
EOSINOPHIL # BLD AUTO: 0.21 K/UL
EOSINOPHIL NFR BLD AUTO: 5.7 %
ESTIMATED AVERAGE GLUCOSE: 126 MG/DL
FERRITIN SERPL-MCNC: 15 NG/ML
FOLATE SERPL-MCNC: 11.9 NG/ML
GLUCOSE SERPL-MCNC: 127 MG/DL
HBA1C MFR BLD HPLC: 6 %
HCT VFR BLD CALC: 28.9 %
HDLC SERPL-MCNC: 106 MG/DL
HGB BLD-MCNC: 8.2 G/DL
IMM GRANULOCYTES NFR BLD AUTO: 0.3 %
IRON SATN MFR SERPL: 3 %
IRON SERPL-MCNC: 14 UG/DL
LDLC SERPL CALC-MCNC: 84 MG/DL
LYMPHOCYTES # BLD AUTO: 1.12 K/UL
LYMPHOCYTES NFR BLD AUTO: 30.4 %
MAN DIFF?: NORMAL
MCHC RBC-ENTMCNC: 21.2 PG
MCHC RBC-ENTMCNC: 28.4 GM/DL
MCV RBC AUTO: 74.9 FL
MONOCYTES # BLD AUTO: 0.41 K/UL
MONOCYTES NFR BLD AUTO: 11.1 %
NEUTROPHILS # BLD AUTO: 1.87 K/UL
NEUTROPHILS NFR BLD AUTO: 50.6 %
NONHDLC SERPL-MCNC: 104 MG/DL
PLATELET # BLD AUTO: 383 K/UL
POTASSIUM SERPL-SCNC: 5.9 MMOL/L
PROT SERPL-MCNC: 6.7 G/DL
RBC # BLD: 3.86 M/UL
RBC # FLD: 19 %
SODIUM SERPL-SCNC: 141 MMOL/L
TIBC SERPL-MCNC: 435 UG/DL
TRIGL SERPL-MCNC: 101 MG/DL
UIBC SERPL-MCNC: 421 UG/DL
VIT B12 SERPL-MCNC: 247 PG/ML
WBC # FLD AUTO: 3.69 K/UL

## 2022-08-09 RX ORDER — ZOLPIDEM TARTRATE 10 MG/1
10 TABLET ORAL
Qty: 30 | Refills: 2 | Status: ACTIVE | COMMUNITY
Start: 2015-09-03 | End: 1900-01-01

## 2022-10-14 NOTE — PHYSICAL THERAPY INITIAL EVALUATION ADULT - NAME OF DISCHARGE PLANNER
Central Line    Date/Time: 10/14/2022 7:10 AM  Performed by: Sandro Henry MD  Authorized by: Sandro Henry MD     General Information and Staff    Procedure Start:  10/14/2022 7:10 AM  Procedure End:  10/14/2022 7:20 AM  Anesthesiologist:  Sandro Henry MD  Performed by:   Anesthesiologist  Patient Location:  OR  Indication: central venous access and CVP monitoring    Site Identification: real time ultrasound guided    Preanesthetic Checklist: 2 patient identifiers, IV checked, risks and benefits discussed, monitors and equipment checked, pre-op evaluation, timeout performed, anesthesia consent and sterile technique used    Procedure Detail    Patient Position:  Trendelenburg  Laterality:  Right  Site:  Internal jugular  Prep:  Chloraprep  Catheter Size:  9 Fr  Catheter Type:  MAC introducer  Number of Lumens:  Double lumen  Oximetric Catheter?: Yes    Procedure Detail: target vein identified, needle advanced into vein and blood aspirated and guidewire advanced into vein    Seldinger Technique?: Yes    Intravenous Verification: verified by ultrasound and venous blood return    Post Insertion: all ports aspirated, all ports flushed easily, guidewire was removed intact, line was sutured in place and dressing was applied      Assessment    Events: patient tolerated procedure well with no complications      PA Catheter Placement    PA Catheter Placed?: Yes    PA Catheter Type:  Oximetric  PA Catheter Size:  8  Laterality:  Right  Site:  Internal jugular  Placement Confirmation: pressure tracing changes and verified by NICOLE    PA Catheter Depth (cm):  48  Events: patient tolerated procedure well with no complications      Additional Comments SW notified via d/c planning sheet.

## 2022-12-10 NOTE — ED ADULT TRIAGE NOTE - NSWEIGHTCALCTOOLDRUG_GEN_A_CORE
A Courtesy call was made to pt post ED visit. Pt states doing better and has not made follow up appt and encouraged to do so. No questions  at the time of phone call.       used

## 2022-12-20 ENCOUNTER — RX RENEWAL (OUTPATIENT)
Age: 82
End: 2022-12-20

## 2023-01-26 ENCOUNTER — RX RENEWAL (OUTPATIENT)
Age: 83
End: 2023-01-26

## 2024-06-14 ENCOUNTER — NON-APPOINTMENT (OUTPATIENT)
Age: 84
End: 2024-06-14

## 2024-11-26 NOTE — OCCUPATIONAL THERAPY INITIAL EVALUATION ADULT - ASR WT BEARING STATUS EVAL
Last seen: 05/25/23 by Phu Carvalho   Next appt: 02/03/25 with darell Merino    Left LE Right LE/Left LE

## 2024-11-27 ENCOUNTER — APPOINTMENT (OUTPATIENT)
Dept: PULMONOLOGY | Facility: CLINIC | Age: 84
End: 2024-11-27

## 2025-04-09 NOTE — PROGRESS NOTE ADULT - NSHPATTENDINGPLANDISCUSS_GEN_ALL_CORE
Patient and staff
ortho resident
Patient and staff
Patient and 
Patient and staff
Dr. Montoya
Yes
Dr. Montoya
Dr. Montoya

## 2025-04-17 NOTE — ED ADULT NURSE NOTE - NS ED NURSE REPORT GIVEN TO FT
Specialty Pharmacy Patient Management Program  Refill Outreach     Claude was contacted today regarding refills of their medication(s).    Refill Questions      Flowsheet Row Most Recent Value   Changes to allergies? No   Changes to medications? No   New conditions or infections since last clinic visit No   Unplanned office visit, urgent care, ED, or hospital admission in the last 4 weeks  No   How does patient/caregiver feel medication is working? Good   Financial problems or insurance changes  No   Since the previous refill, were any specialty medication doses or scheduled injections missed or delayed?  No   Does this patient require a clinical escalation to a pharmacist? No            Delivery Questions      Flowsheet Row Most Recent Value   Delivery method  at Pharmacy   Delivery address Prescription   Medication(s) being filled and delivered Inclisiran Sodium (Leqvio)   Copay verified? Yes   Copay amount $0   Copay form of payment No copayment ($0)   Signature Required No                 Follow-up: 180 day(s)     Kathy Snow, Pharmacy Technician  4/17/2025  15:01 EDT    
Maxi MONTAÑO

## 2025-07-07 NOTE — ED PROVIDER NOTE - GASTROINTESTINAL, MLM
Anesthesia Pre Eval Note    Anesthesia ROS/Med Hx        Anesthetic Complication History:    Patient does not have a history of anesthetic complications      Pulmonary Review:  Patient does not have a pulmonary history      Neuro/Psych Review:       Positive for psychiatric history    Cardiovascular Review:     Positive for hypertension    GI/HEPATIC/RENAL Review:  Patient does not have a GI/hepatic/renalhistory       End/Other Review:  Patient does not have an endo/other history      Overall Review of Systems Comments:  On GLP1      Relevant Problems   No relevant active problems       Physical Exam     Airway   Mallampati: II  TM Distance: >3 FB  Neck ROM: Full  Neck: Non-tender and Able to place in sniff position  TMJ Mobility: Good    Cardiovascular  Cardiovascular exam normal  Cardio Rhythm: Regular  Cardio Rate: Normal    Head Assessment  Head assessment: Normocephalic and Atraumatic    General Assessment  General Assessment: Alert and oriented and No acute distress    Dental Exam  Dental exam normal    Pulmonary Exam  Pulmonary exam normal  Breath sounds clear to auscultation:  Yes    Abdominal Exam  Abdominal exam normal      Vitals:   No data found.      Anesthesia Plan:    ASA Status: 2  Anesthesia Type: MAC    Induction: Intravenous  Maintenance: TIVA    Post-op Pain Management: Per Surgeon      Checklist  Reviewed: NPO Status, Allergies, Medications, Problem list, Past Med History and Patient Summary  Consent/Risks Discussed Statement:  The proposed anesthetic plan, including its risks and benefits, have been discussed with the Patient along with the risks and benefits of alternatives. Questions were encouraged and answered and the patient and/or representative understands and agrees to proceed.        I discussed with the patient (and/or patient's legal representative) the risks and benefits of the proposed anesthesia plan, MAC, which may include services performed by other anesthesia  providers.    Alternative anesthesia plans, if available, were reviewed with the patient (and/or patient's legal representative). Discussion has been held with the patient (and/or patient's legal representative) regarding risks of anesthesia, which include Intra-operative Awareness and emergent situations that may require change in anesthesia plan.    The patient (and/or patient's legal representative) has indicated understanding, his/her questions have been answered, and he/she wishes to proceed with the planned anesthetic.    Blood Products: Not Anticipated     Abdomen soft, non-tender, no guarding.

## 2025-07-14 NOTE — ED ADULT NURSE NOTE - NS ED PATIENT SAFETY CONCERN
07/14/25 1002   Discharge Planning   Home or Post Acute Services   (Blood Pressure Monitor)     Patient meets criteria for home monitoring of blood pressure post discharge.  Reason:si current preeclampsia with severe features. Met with patient to assess for availability of home BP monitor.  Patient stated she owns home BP monitor.  Patient educated on importance of continuing to monitor BP at home, recording BP on home monitoring log and s/sx of when to call her provider.  Pt verbalized understanding the above information.     No